# Patient Record
Sex: FEMALE | Race: WHITE | NOT HISPANIC OR LATINO | Employment: FULL TIME | URBAN - METROPOLITAN AREA
[De-identification: names, ages, dates, MRNs, and addresses within clinical notes are randomized per-mention and may not be internally consistent; named-entity substitution may affect disease eponyms.]

---

## 2017-01-14 ENCOUNTER — HOSPITAL ENCOUNTER (OUTPATIENT)
Facility: HOSPITAL | Age: 52
Discharge: HOME | End: 2017-01-16
Attending: EMERGENCY MEDICINE | Admitting: INTERNAL MEDICINE
Payer: COMMERCIAL

## 2017-01-14 DIAGNOSIS — F41.1 GAD (GENERALIZED ANXIETY DISORDER): ICD-10-CM

## 2017-01-14 DIAGNOSIS — R53.1 RIGHT SIDED WEAKNESS: ICD-10-CM

## 2017-01-14 DIAGNOSIS — R07.9 CHEST PAIN, UNSPECIFIED: Primary | ICD-10-CM

## 2017-01-14 DIAGNOSIS — I63.412 CEREBROVASCULAR ACCIDENT (CVA) DUE TO EMBOLISM OF LEFT MIDDLE CEREBRAL ARTERY: ICD-10-CM

## 2017-01-14 DIAGNOSIS — R94.31 ABNORMAL EKG: ICD-10-CM

## 2017-01-14 DIAGNOSIS — I63.9 CEREBRAL INFARCTION: ICD-10-CM

## 2017-01-14 DIAGNOSIS — G43.109 MIGRAINE WITH AURA AND WITHOUT STATUS MIGRAINOSUS, NOT INTRACTABLE: ICD-10-CM

## 2017-01-14 PROBLEM — I21.9 MI (MYOCARDIAL INFARCTION): Status: ACTIVE | Noted: 2017-01-14

## 2017-01-14 LAB
ALBUMIN SERPL BCP-MCNC: 4 G/DL
ALP SERPL-CCNC: 101 U/L
ALT SERPL W/O P-5'-P-CCNC: 19 U/L
ANION GAP SERPL CALC-SCNC: 11 MMOL/L
AST SERPL-CCNC: 25 U/L
BASOPHILS # BLD AUTO: 0.1 K/UL
BASOPHILS NFR BLD: 1 %
BILIRUB SERPL-MCNC: 0.3 MG/DL
BNP SERPL-MCNC: 49 PG/ML
BUN SERPL-MCNC: 8 MG/DL
CALCIUM SERPL-MCNC: 10.1 MG/DL
CHLORIDE SERPL-SCNC: 105 MMOL/L
CK MB SERPL-MCNC: 0.3 NG/ML
CK MB SERPL-RTO: 0.8 %
CK SERPL-CCNC: 37 U/L
CO2 SERPL-SCNC: 24 MMOL/L
CREAT SERPL-MCNC: 0.9 MG/DL
DIASTOLIC DYSFUNCTION: NO
DIFFERENTIAL METHOD: ABNORMAL
EOSINOPHIL # BLD AUTO: 0.6 K/UL
EOSINOPHIL NFR BLD: 8.9 %
ERYTHROCYTE [DISTWIDTH] IN BLOOD BY AUTOMATED COUNT: 13.5 %
EST. GFR  (AFRICAN AMERICAN): >60 ML/MIN/1.73 M^2
EST. GFR  (NON AFRICAN AMERICAN): >60 ML/MIN/1.73 M^2
ESTIMATED PA SYSTOLIC PRESSURE: 31.36
GLOBAL PERICARDIAL EFFUSION: NORMAL
GLUCOSE SERPL-MCNC: 92 MG/DL
HCT VFR BLD AUTO: 38.4 %
HGB BLD-MCNC: 13 G/DL
INR PPP: 1
LYMPHOCYTES # BLD AUTO: 3.5 K/UL
LYMPHOCYTES NFR BLD: 48.4 %
MCH RBC QN AUTO: 29.6 PG
MCHC RBC AUTO-ENTMCNC: 33.8 %
MCV RBC AUTO: 88 FL
MONOCYTES # BLD AUTO: 0.6 K/UL
MONOCYTES NFR BLD: 7.8 %
NEUTROPHILS # BLD AUTO: 2.4 K/UL
NEUTROPHILS NFR BLD: 33.9 %
PLATELET # BLD AUTO: 292 K/UL
PMV BLD AUTO: 7.5 FL
POTASSIUM SERPL-SCNC: 4.3 MMOL/L
PROT SERPL-MCNC: 7.6 G/DL
PROTHROMBIN TIME: 10.1 SEC
RBC # BLD AUTO: 4.38 M/UL
RETIRED EF AND QEF - SEE NOTES: 54 (ref 55–65)
SODIUM SERPL-SCNC: 140 MMOL/L
TROPONIN I SERPL DL<=0.01 NG/ML-MCNC: 0.01 NG/ML
TROPONIN I SERPL DL<=0.01 NG/ML-MCNC: 0.01 NG/ML
TROPONIN I SERPL DL<=0.01 NG/ML-MCNC: <0.006 NG/ML
TSH SERPL DL<=0.005 MIU/L-ACNC: 2.04 UIU/ML
WBC # BLD AUTO: 7.2 K/UL

## 2017-01-14 PROCEDURE — 80053 COMPREHEN METABOLIC PANEL: CPT

## 2017-01-14 PROCEDURE — 36415 COLL VENOUS BLD VENIPUNCTURE: CPT

## 2017-01-14 PROCEDURE — 93005 ELECTROCARDIOGRAM TRACING: CPT

## 2017-01-14 PROCEDURE — 92610 EVALUATE SWALLOWING FUNCTION: CPT

## 2017-01-14 PROCEDURE — G8998 SWALLOW D/C STATUS: HCPCS | Mod: CH

## 2017-01-14 PROCEDURE — 25000003 PHARM REV CODE 250: Performed by: EMERGENCY MEDICINE

## 2017-01-14 PROCEDURE — 63600175 PHARM REV CODE 636 W HCPCS: Performed by: EMERGENCY MEDICINE

## 2017-01-14 PROCEDURE — 63600175 PHARM REV CODE 636 W HCPCS: Performed by: INTERNAL MEDICINE

## 2017-01-14 PROCEDURE — C8929 TTE W OR WO FOL WCON,DOPPLER: HCPCS

## 2017-01-14 PROCEDURE — G8996 SWALLOW CURRENT STATUS: HCPCS | Mod: CH

## 2017-01-14 PROCEDURE — 82553 CREATINE MB FRACTION: CPT

## 2017-01-14 PROCEDURE — 85025 COMPLETE CBC W/AUTO DIFF WBC: CPT

## 2017-01-14 PROCEDURE — 84484 ASSAY OF TROPONIN QUANT: CPT | Mod: 91

## 2017-01-14 PROCEDURE — 96375 TX/PRO/DX INJ NEW DRUG ADDON: CPT

## 2017-01-14 PROCEDURE — 84484 ASSAY OF TROPONIN QUANT: CPT

## 2017-01-14 PROCEDURE — 99245 OFF/OP CONSLTJ NEW/EST HI 55: CPT | Mod: ,,, | Performed by: INTERNAL MEDICINE

## 2017-01-14 PROCEDURE — 63600175 PHARM REV CODE 636 W HCPCS: Performed by: NURSE PRACTITIONER

## 2017-01-14 PROCEDURE — 99204 OFFICE O/P NEW MOD 45 MIN: CPT | Mod: ,,, | Performed by: PSYCHIATRY & NEUROLOGY

## 2017-01-14 PROCEDURE — G8999 MOTOR SPEECH CURRENT STATUS: HCPCS | Mod: CI

## 2017-01-14 PROCEDURE — 99220 PR INITIAL OBSERVATION CARE,LEVL III: CPT | Mod: ,,, | Performed by: INTERNAL MEDICINE

## 2017-01-14 PROCEDURE — 96365 THER/PROPH/DIAG IV INF INIT: CPT

## 2017-01-14 PROCEDURE — 25000003 PHARM REV CODE 250: Performed by: INTERNAL MEDICINE

## 2017-01-14 PROCEDURE — 99285 EMERGENCY DEPT VISIT HI MDM: CPT | Mod: 25

## 2017-01-14 PROCEDURE — 84443 ASSAY THYROID STIM HORMONE: CPT

## 2017-01-14 PROCEDURE — 85610 PROTHROMBIN TIME: CPT

## 2017-01-14 PROCEDURE — 83880 ASSAY OF NATRIURETIC PEPTIDE: CPT

## 2017-01-14 PROCEDURE — G0378 HOSPITAL OBSERVATION PER HR: HCPCS

## 2017-01-14 PROCEDURE — 92523 SPEECH SOUND LANG COMPREHEN: CPT

## 2017-01-14 PROCEDURE — G9186 MOTOR SPEECH GOAL STATUS: HCPCS | Mod: CH

## 2017-01-14 PROCEDURE — G8997 SWALLOW GOAL STATUS: HCPCS | Mod: CH

## 2017-01-14 RX ORDER — PANTOPRAZOLE SODIUM 40 MG/1
40 TABLET, DELAYED RELEASE ORAL DAILY
Status: DISCONTINUED | OUTPATIENT
Start: 2017-01-14 | End: 2017-01-16 | Stop reason: HOSPADM

## 2017-01-14 RX ORDER — ONDANSETRON 2 MG/ML
4 INJECTION INTRAMUSCULAR; INTRAVENOUS EVERY 4 HOURS PRN
Status: DISCONTINUED | OUTPATIENT
Start: 2017-01-14 | End: 2017-01-16 | Stop reason: HOSPADM

## 2017-01-14 RX ORDER — AMOXICILLIN 250 MG
1 CAPSULE ORAL DAILY PRN
Status: DISCONTINUED | OUTPATIENT
Start: 2017-01-14 | End: 2017-01-16 | Stop reason: HOSPADM

## 2017-01-14 RX ORDER — NITROGLYCERIN 0.4 MG/1
0.4 TABLET SUBLINGUAL
Status: COMPLETED | OUTPATIENT
Start: 2017-01-14 | End: 2017-01-14

## 2017-01-14 RX ORDER — MORPHINE SULFATE 4 MG/ML
4 INJECTION, SOLUTION INTRAMUSCULAR; INTRAVENOUS EVERY 4 HOURS PRN
Status: DISCONTINUED | OUTPATIENT
Start: 2017-01-14 | End: 2017-01-16 | Stop reason: HOSPADM

## 2017-01-14 RX ORDER — ACETAMINOPHEN 325 MG/1
650 TABLET ORAL EVERY 6 HOURS PRN
Status: DISCONTINUED | OUTPATIENT
Start: 2017-01-14 | End: 2017-01-16 | Stop reason: HOSPADM

## 2017-01-14 RX ORDER — DIPHENHYDRAMINE HYDROCHLORIDE 50 MG/ML
12.5 INJECTION INTRAMUSCULAR; INTRAVENOUS
Status: COMPLETED | OUTPATIENT
Start: 2017-01-14 | End: 2017-01-14

## 2017-01-14 RX ORDER — ASPIRIN 325 MG
325 TABLET ORAL DAILY
Status: DISCONTINUED | OUTPATIENT
Start: 2017-01-15 | End: 2017-01-16 | Stop reason: HOSPADM

## 2017-01-14 RX ORDER — ASPIRIN 325 MG
325 TABLET ORAL
Status: COMPLETED | OUTPATIENT
Start: 2017-01-14 | End: 2017-01-14

## 2017-01-14 RX ORDER — ONDANSETRON 2 MG/ML
4 INJECTION INTRAMUSCULAR; INTRAVENOUS EVERY 8 HOURS PRN
Status: DISCONTINUED | OUTPATIENT
Start: 2017-01-14 | End: 2017-01-14

## 2017-01-14 RX ADMIN — ASPIRIN 325 MG ORAL TABLET 325 MG: 325 PILL ORAL at 10:01

## 2017-01-14 RX ADMIN — NITROGLYCERIN 0.4 MG: 0.4 TABLET SUBLINGUAL at 09:01

## 2017-01-14 RX ADMIN — HUMAN ALBUMIN MICROSPHERES AND PERFLUTREN 0.11 MG: 10; .22 INJECTION, SOLUTION INTRAVENOUS at 05:01

## 2017-01-14 RX ADMIN — MORPHINE SULFATE 4 MG: 4 INJECTION INTRAVENOUS at 07:01

## 2017-01-14 RX ADMIN — ACETAMINOPHEN 650 MG: 325 TABLET, FILM COATED ORAL at 12:01

## 2017-01-14 RX ADMIN — PANTOPRAZOLE SODIUM 40 MG: 40 TABLET, DELAYED RELEASE ORAL at 05:01

## 2017-01-14 RX ADMIN — NITROGLYCERIN 0.5 INCH: 20 OINTMENT TOPICAL at 10:01

## 2017-01-14 RX ADMIN — DIPHENHYDRAMINE HYDROCHLORIDE 12.5 MG: 50 INJECTION, SOLUTION INTRAMUSCULAR; INTRAVENOUS at 10:01

## 2017-01-14 RX ADMIN — ONDANSETRON 4 MG: 2 INJECTION INTRAMUSCULAR; INTRAVENOUS at 10:01

## 2017-01-14 RX ADMIN — ONDANSETRON 4 MG: 2 INJECTION INTRAMUSCULAR; INTRAVENOUS at 06:01

## 2017-01-14 RX ADMIN — MORPHINE SULFATE 4 MG: 4 INJECTION INTRAVENOUS at 02:01

## 2017-01-14 RX ADMIN — PROMETHAZINE HYDROCHLORIDE 12.5 MG: 25 INJECTION INTRAMUSCULAR; INTRAVENOUS at 10:01

## 2017-01-14 NOTE — CONSULTS
"Consult Note  Cardiology    Inpatient consult to Cardiology  Consult performed by: KERWIN LEIGH  Consult ordered by: HETAL HERRING        Reason for Consult: Chest Pain    SUBJECTIVE:     History of Present Illness:  Renée Goff is a 51 y.o. female with PMH of GHN, CVA x2, atrial septal defect s/p closure in 1996, migraines, who presents with chest pain.  Chest pain started last night while lying down.  Pain localized to mid chest, non-radiating with associated SOB.  Described as "pressure" in character, 8/10 in intensity.  Chest pain lasted approximately 10 hours, and improved to some degree with sublingual nitro.  She also reports a intermittent migraine headache for the past 48 hours.  Pt's  states she does not remember some of her grandchildren, or that her mother has passed away years ago.  Troponin negative  X 2.  EKG with sinus rhythm with PVC's.  Also with anterior T wave inversions which are chronic and unchanged compared to previous tracings.  Carotid ultrasound done today shows no significant carotid disease.   CT/MRI brain negative for significant abnormalities.  Pt appears nervous and very anxious with crying during exam.  Currently chest pain free and hemodynamically stable. Pt is followed by Dr. Chavira in clinic. Of note, pt does not have history of CAD,  and has never had an MI.  Previously presented with chest pain in 2012, and underwent cardiac workup which included a normal nuclear stress test.  Echo at that time with EF 51% and normal diastolic function.      Review of patient's allergies indicates:  No Known Allergies    Current Discharge Medication List      CONTINUE these medications which have NOT CHANGED    Details   alprazolam (XANAX) 0.5 MG tablet Take 0.5 mg by mouth nightly as needed.        amitriptyline (ELAVIL) 10 MG tablet Take 40 mg by mouth every evening.       aspirin (ECOTRIN) 81 MG EC tablet Take 81 mg by mouth once daily.        multivitamin (MULTIVITAMIN) per " tablet Take 1 tablet by mouth once daily.        propranolol (INDERAL) 40 MG tablet Take 1 tablet (40 mg total) by mouth 2 (two) times daily.  Qty: 180 tablet, Refills: 3    Associated Diagnoses: Migraine with aura and without status migrainosus, not intractable; Heart palpitations      meloxicam (MOBIC) 7.5 MG tablet Take 7.5 mg by mouth 2 (two) times daily.      promethazine-dextromethorphan (PROMETHAZINE-DM) 6.25-15 mg/5 mL Syrp Take 5 mLs by mouth every 4 (four) hours as needed.  Qty: 180 mL, Refills: 0    Associated Diagnoses: Acute sinusitis, recurrence not specified, unspecified location                 Past Medical History   Diagnosis Date    MI (myocardial infarction)     Stroke      Past Surgical History   Procedure Laterality Date    Coronary artery bypass graft       Family History   Problem Relation Age of Onset    Heart attack Maternal Grandfather      Social History   Substance Use Topics    Smoking status: Never Smoker    Smokeless tobacco: Never Used    Alcohol use No        Review of Systems:  Constitution: Denies chills, fever, and sweats.  HENT: Denies headaches or blurry vision.  Cardiovascular: Denies chest pain or irregular heart beat.  Respiratory: Denies cough or shortness of breath.  Gastrointestinal: Denies abdominal pain, nausea, or vomiting.  Musculoskeletal: Denies muscle cramps.  Neurological: Denies dizziness or focal weakness.  Psychiatric/Behavioral: Normal mental status.  Hematologic/Lymphatic: Denies bleeding problem or easy bruising/bleeding.  Skin: Denies rash or suspicious lesions      OBJECTIVE:     Vital Signs (Most Recent)  Temp: 99.2 °F (37.3 °C) (01/14/17 1133)  Pulse: 73 (01/14/17 1133)  Resp: 18 (01/14/17 1133)  BP: 126/85 (01/14/17 1133)  SpO2: 100 % (01/14/17 1133)    Vital Signs Range (Last 24H):  Temp:  [96.8 °F (36 °C)-99.2 °F (37.3 °C)]   Pulse:  []   Resp:  [18-20]   BP: (117-159)/(63-98)   SpO2:  [87 %-100 %]     Physical Exam:  Constitutional: No  acute distress, conversant  HEENT: Sclera anicteric, Pupils equal, round and reactive to light, extraocular motions intact, Oropharynx clear  Neck: No JVD, no carotid bruits  Cardiovascular: regular rate and rhythm, no murmur, rubs or gallops, normal S1/S2  Pulmonary: Clear to auscultation bilaterally  Abdominal: Abdomen soft, nontender, nondistended, positive bowel sounds  Extremities: No lower extremity edema,   Pulses:  Carotid pulses are 2+ on the right side, and 2+ on the left side.  Radial pulses are 2+ on the right side, and 2+ on the left side.   Femoral pulses are 2+ on the right side, and 2+ on the left side.  Popliteal pulses are 2+ on the right side, and 2+ on the left side.   Dorsalis pedis pulses are 2+ on the right side, and 2+ on the left side.   Posterior tibial pulses are 2+ on the right side, and 2+ on the left side.     Skin: No ecchymosis, erythema, or ulcers  Psych: Alert and oriented x 3, appropriate affect  Neuro: CNII-XII intact, no focal deficits    Laboratory:  Chemistry:  Lab Results   Component Value Date     01/14/2017    K 4.3 01/14/2017     01/14/2017    CO2 24 01/14/2017    BUN 8 01/14/2017    CREATININE 0.9 01/14/2017    CREATININE 0.8 11/14/2012    GLUCOSE 74 11/14/2012    CALCIUM 10.1 01/14/2017    CALCIUM 9.6 11/14/2012    BNP 49 01/14/2017     Cardiac Markers:  Lab Results   Component Value Date    TROPONINI 0.015 01/14/2017    TROPONINI 0.013 11/14/2012     Cardiac Markers (Last 3):  Lab Results   Component Value Date    TROPONINI 0.015 01/14/2017    TROPONINI 0.013 11/14/2012    TROPONINI <0.006 11/14/2012    TROPONINI <0.006 11/13/2012     CBC:   Lab Results   Component Value Date    WBC 7.20 01/14/2017    HGB 13.0 01/14/2017    HGB 12.3 11/14/2012    HCT 38.4 01/14/2017    MCV 88 01/14/2017     01/14/2017     Lipids:  Lab Results   Component Value Date    CHOL 218 (H) 04/27/2012    TRIG 128 04/27/2012    HDL 63 11/14/2012     Coagulation:   Lab Results    Component Value Date    INR 1.0 01/14/2017    INR 1.01 11/12/2012    APTT 24.7 11/12/2012       Diagnostic Results:  Labs: Reviewed  ECG: Reviewed  MRI: Reviewed    Carotid Ultrasound 1/14/2016:  FINDINGS:     Right common carotid:   Peak systolic velocity 89 cm/sec.    Right internal carotid artery:   Peak systolic velocity: 82 cm/sec.   IC/CC ratio:  0.9.    Left common carotid:   Peak systolic velocity 105 cm/sec.    Left internal carotid artery:     Peak systolic velocity 76 cm/sec.    IC/CC ratio 0.7.    Significant atherosclerotic plaque or calcification is not identified by real-time ultrasound.Both vertebral arteries demonstrate antegrade flow.   Impression       Significant atherosclerotic plaque or calcification is not identified in the carotid arteries by real-time ultrasound or NASCET criteria          MRI Brain 1/14/2017:  Negative MRI of the brain.  Chronic right maxillary and ethmoid sinusitis    CT head 1/14/2017:  Negative Head CT.     Echo 11/13/2012:  CONCLUSIONS     1 - Mild left ventricular enlargement.     2 - Low normal left ventricular function (EF 51%).     3 - Normal diastolic function.     4 - Right ventricle is upper limit of normal in size with normal systolic function.     Nuclear stress test 11/13/2012:  Nuclear Quantitative Functional Analysis:   LVEF: 53 % (normal is 55 - 69)  LVED Volume: 105 ml (normal is 60 - 98)  LVES Volume: 49 ml (normal is 20 - 42)    Impression: NORMAL MYOCARDIAL PERFUSION  1. The perfusion scan is free of evidence for myocardial ischemia or injury.   2. There is a moderate intensity fixed defect in the anteroapical wall of the left ventricle, secondary to breast attenuation.   3. Resting wall motion is physiologic.   4. There is resting LV dysfunction with a reduced ejection fraction of 53 %.  (normal is 55 - 69)  5. The ventricular volumes are normal at rest and stress.   6. The extracardiac distribution of radioactivity is normal.     ASSESSMENT/PLAN:    Renée Goff is a 51 y.o. female with PMH of GHN, CVA x2, atrial septal defect s/p closure in 1996, migraines, who presents with chest pain.      -Continue full dose ASA daily and trend cardiac enzymes every 6 hours with EKG  -Echo pending  -Continue to monitor on telemetry

## 2017-01-14 NOTE — ED PROVIDER NOTES
Encounter Date: 1/14/2017       History   No chief complaint on file.    Review of patient's allergies indicates:  No Known Allergies  HPI Comments: 51-year-old female with a history of a stroke and a myocardial infarction presents to the ER complaining of chest pain which began last night.  Also complaining of arm pain and back pain and headache.  She states the headache began last night.  No further history is obtainable from the patient.  She cannot state any aggravating or alleviating factors.    The history is provided by the patient. History limited by: Very poor historian.     Past Medical History   Diagnosis Date    MI (myocardial infarction)     Stroke      No past medical history pertinent negatives.  Past Surgical History   Procedure Laterality Date    Coronary artery bypass graft       Family History   Problem Relation Age of Onset    Heart attack Maternal Grandfather      Social History   Substance Use Topics    Smoking status: Never Smoker    Smokeless tobacco: Never Used    Alcohol use No     Review of Systems   Unable to perform ROS: Other (Unable to evaluate secondary to mental status)       Physical Exam   Initial Vitals   BP Pulse Resp Temp SpO2   -- -- -- -- --            Physical Exam    Nursing note and vitals reviewed.  Constitutional: She appears well-developed and well-nourished. She is not diaphoretic. She is uncooperative. She appears ill. No distress.   Chronically ill-appearing   HENT:   Head: Normocephalic and atraumatic.   Eyes: EOM are normal.   Refuses to open her eyes for exam   Neck: Normal range of motion. Neck supple.   Cardiovascular: Normal rate, regular rhythm, normal heart sounds and intact distal pulses.   Intact distal radius pulses   Pulmonary/Chest: Breath sounds normal. No respiratory distress. She has no wheezes.   Abdominal: Soft. She exhibits no distension. There is no tenderness. There is no rebound.   Musculoskeletal: Normal range of motion.   Neurological:  "She is alert and oriented to person, place, and time.   Oriented to "2017" and "hospital"    Slurring her words slightly.    Able to briefly raise her right arm and right leg off of the gurney with a rapidly fall back down.  Subjective decreased sensation to light touch in the right arm and the right leg.   Skin: Skin is warm and dry.   Psychiatric: She has a normal mood and affect. Her behavior is normal. Judgment and thought content normal.    anxious         ED Course   Procedures  Labs Reviewed - No data to display  EKG Readings: (Independently Interpreted)   Initial Reading: No STEMI. Previous EKG: Compared with most recent EKG Previous EKG Date: No change from November 3, 2016. Rhythm: Normal Sinus Rhythm. Ectopy: No Ectopy PVCs. Conduction: Normal. ST Segments: Normal ST Segments. T Waves: Normal. T Waves Flipped: V2, V3, V4, V6 and V5. Clinical Impression: Normal Sinus Rhythm          Medical Decision Making:   Clinical Tests:   Lab Tests: Ordered and Reviewed  Radiological Study: Ordered and Reviewed  Medical Tests: Ordered and Reviewed                   ED Course   Comment By Time   Chest pain has resolved at this time.  Complaining of a headache. Noah Coffey MD 01/14 6630   Case discussed with Dr. Zamora of neurology who recommends admission to the hospital full dose aspirin and an MRI.  Case also discussed with Dr. Bynum of hospital medicine who will admit the patient.  Patient will be admitted on telemetry for serial troponins. Noah Coffey MD 01/14 6596     Clinical Impression:   The primary encounter diagnosis was Chest pain, unspecified. A diagnosis of Right sided weakness was also pertinent to this visit.      51-year-old female with a history of a stroke and myocardial infarctions presents for chest pain and arm pain and back pain.  Unable to obtain review of systems.  Patient unable to answer basic questions regarding history of present illness, review of systems were her basic medical " history.  She is oriented to location and year.  She cannot remember her primary care provider or her past medical history.  She cannot state any aggravating or alleviating factors.  She refuses to open her eyes during the exam.  She appears anxious.  Very wide differential, CT of the head labs x-ray EKG will be obtained.    9:06 AM   now here and can help provide more history.  He states the patient awoke this morning with chest pain and arm pain and back pain.  He states this happens to her about once a year.  Also complaining of a frontal headache which is typical for her.  Also complaining of some shortness of breath.  Patient is also complaining of numbness and weakness to the right arm and right leg which is worse than her baseline.  Her  has helped to calm her down and she is now able to participate in a physical exam.  She is hardly able to get her right leg off of the bed at all and can only briefly raise her right arm before it falls back down to the bed.  Some subjective decreased sensation to light touch to the right lower extremity and the right arm.  Normal pulses throughout I doubt dissection.  She is able to raise her left arm normally.  She can raise her left leg off of the bed and hold it there for several seconds before he falls back down.  No appreciable facial droop.  Patient will be evaluated for both a stroke and an MI.  I doubt pulmonary embolus or dissection.  Patient will be admitted to hospital medicine for further care.     Noah Coffey MD  01/14/17 4983

## 2017-01-14 NOTE — IP AVS SNAPSHOT
[address] [place] [address]  Phone: [phone]           I have received a copy of my After Visit Summary and discharge instructions from [facility].    INSTRUCTIONS RECEIVED AND UNDERSTOOD BY:                     Patient/Patient Representative: ________________________________________________________________     Date/Time: ________________________________________________________________                     Instructions Given By: ________________________________________________________________     Date/Time: ________________________________________________________________

## 2017-01-14 NOTE — IP AVS SNAPSHOT
[address] [place] [address]  Phone: [phone]           Patient Discharge Instructions     Our goal is to set you up for success. This packet includes information on your condition, medications, and your home care. It will help you to care for yourself so you don't get sicker and need to go back to the hospital.     Please ask your nurse if you have any questions.        There are many details to remember when preparing to leave the hospital. Here is what you will need to do:    1. Take your medicine. If you are prescribed medications, review your Medication List in the following pages. You may have new medications to  at the pharmacy and others that you'll need to stop taking. Review the instructions for how and when to take your medications. Talk with your doctor or nurses if you are unsure of what to do.     2. Go to your follow-up appointments. Specific follow-up information is listed in the following pages. Your may be contacted by a transition nurse or clinical provider about future appointments. Be sure we have all of the phone numbers to reach you, if needed. Please contact your provider's office if you are unable to make an appointment.     3. Watch for warning signs. Your doctor or nurse will give you detailed warning signs to watch for and when to call for assistance. These instructions may also include educational information about your condition. If you experience any of warning signs to your health, call your doctor.               [facility]  Unless otherwise directed by your provider, please contact [facility], our nurse care line that is available for 24/7 assistance.     [phone] (toll-free)    Registered nurses in the [facility] provide clinical advisement, health education, appointment booking, and other advisory services.                    ** Verify the list of medication(s) below is accurate and up to date.  Carry this with you in case of emergency. If your medications have changed, please notify your healthcare provider.             Medication List      START taking these medications        Additional Info                      aspirin 325 MG tablet   Refills:  0   Dose:  325 mg   Replaces:  aspirin 81 MG EC tablet    Last time this was given:  325 mg on 1/16/2017  8:34 AM   Instructions:  Take 1 tablet (325 mg total) by mouth once daily.     Begin Date    AM    Noon    PM    Bedtime       atorvastatin 20 MG tablet   Commonly known as:  LIPITOR   Quantity:  30 tablet   Refills:  0   Dose:  20 mg    Last time this was given:  20 mg on 1/16/2017  8:34 AM   Instructions:  Take 1 tablet (20 mg total) by mouth once daily.     Begin Date    AM    Noon    PM    Bedtime       butalbital-acetaminophen-caffeine -40 mg -40 mg per tablet   Commonly known as:  FIORICET, ESGIC   Quantity:  15 tablet   Refills:  0   Dose:  1 tablet    Last time this was given:  1 tablet on 1/16/2017  8:37 AM   Instructions:  Take 1 tablet by mouth every 4 (four) hours as needed for Headaches.     Begin Date    AM    Noon    PM    Bedtime         CONTINUE taking these medications        Additional Info                      alprazolam 0.5 MG tablet   Commonly known as:  XANAX   Refills:  0   Dose:  0.5 mg    Instructions:  Take 0.5 mg by mouth nightly as needed.     Begin Date    AM    Noon    PM    Bedtime       amitriptyline 10 MG tablet   Commonly known as:  ELAVIL   Refills:  0   Dose:  40 mg    Instructions:  Take 40 mg by mouth every evening.     Begin Date    AM    Noon    PM    Bedtime       meloxicam 7.5 MG tablet   Commonly known as:  MOBIC   Refills:  0   Dose:  7.5 mg    Instructions:  Take 7.5 mg by mouth 2 (two) times daily.     Begin Date    AM    Noon    PM    Bedtime       ONE DAILY MULTIVITAMIN per tablet   Refills:  0   Dose:  1 tablet   Generic drug:  multivitamin    Instructions:  Take 1 tablet by mouth once daily.      Begin Date    AM    Noon    PM    Bedtime       propranolol 40 MG tablet   Commonly known as:  INDERAL   Quantity:  180 tablet   Refills:  3   Dose:  40 mg   Indications:  Migraine Prevention, palpitations    Instructions:  Take 1 tablet (40 mg total) by mouth 2 (two) times daily.     Begin Date    AM    Noon    PM    Bedtime         STOP taking these medications     aspirin 81 MG EC tablet   Commonly known as:  ECOTRIN   Replaced by:  aspirin 325 MG tablet       promethazine-dextromethorphan 6.25-15 mg/5 mL Syrp   Commonly known as:  PROMETHAZINE-DM            Where to Get Your Medications      You can get these medications from any pharmacy     Bring a paper prescription for each of these medications     atorvastatin 20 MG tablet    butalbital-acetaminophen-caffeine -40 mg -40 mg per tablet       You don't need a prescription for these medications     aspirin 325 MG tablet                  Please bring to all follow up appointments:    1. A copy of your discharge instructions.  2. All medicines you are currently taking in their original bottles.  3. Identification and insurance card.    Please arrive 15 minutes ahead of scheduled appointment time.    Please call 24 hours in advance if you must reschedule your appointment and/or time.        Your Scheduled Appointments     Jan 25, 2017 10:00 AM CST   Established Patient Visit with Thomas Khan MD   [facility]    [address] [facility] [id number] [phone]            Nov 09, 2017  1:30 PM CST   Established Patient Visit with MD [facility] ([facility])    [address]  [facility] [address] [phone]              Follow-up Information     Follow up with Thomas Khan MD On 1/25/2017.    Specialty:  Family Medicine    Why:  @10am     Contact information:    [address] [facility] [address]  [facility] [address]  [phone]          Follow up with  Giacomo Chavira MD.    Specialty:  Cardiology    Contact information:    [address]  [facility] [address]  [phone]          Follow up with Dougie Zamora Jr, MD.    Specialty:  Neurology    Contact information:    [address]  [facility] [address]  [phone]        Referrals     Future Orders    Ambulatory Referral to Physical/Occupational Therapy     Questions:    Post Surgical?:  No    Eval and Treat:  Yes    Duration:      Frequency (times per week):      Precautions:      Location:      OT Location:      Restore Functional ADL Training?:      Gait Training:      Therapeutic Exercises:      Wound Care:      Traction:      Electric Stimulation:      IONTO.:      U/S:      Developmental Stimulation?:      Specialty Programs:      Ambulatory Referral to Speech Therapy     Questions:    Speech Therapy Eval and Treat:      Speech Language Eval and Treat:      Bedside Swallow Study:      Modified Barium Swallow Study:      Referral to OutPatient Speech Therapy     Questions:    Speech Therapy Eval and Treat:  Yes    Speech Language Eval and Treat:  Yes    Bedside Swallow Study:      Modified Barium Swallow Study:          Discharge Instructions     Future Orders    Call MD for:     Comments:    For worsening symptoms, chest pain, shortness of breath, increased abdominal pain, high grade fever, stroke or stroke like symptoms, immediately go to the nearest Emergency Room or call 911 as soon as possible.    Diet general     Comments:    Cardiac/ 2 gram sodium low cholesterol diet    Questions:    Total calories:      Fat restriction, if any:      Protein restriction, if any:      Na restriction, if any:      Fluid restriction:      Additional restrictions:      Other restrictions (specify):     Comments:    Fall precautions        Primary Diagnosis     Your primary diagnosis was:  Chest Pain      Admission Information     Date & Time Provider Department CSN    1/14/2017  8:27 AM Aqib Sultan, MD [facility]  "[facility] [id number]      Care Providers     Provider Role Specialty Primary office phone    Sharon Bynum MD Attending Provider Internal Medicine [phone]    Giacomo Chavira MD Consulting Physician  Cardiology [phone]    Dougie Zamora Jr., MD Consulting Physician  Neurology [phone]    Shirley Jennings NP Consulting Provider  Cardiology  [phone]      Your Vitals Were     BP Pulse Temp Resp Height Weight    103/65 (BP Location: Right arm, Patient Position: Lying, BP Method: Automatic) 81 98.8 °F (37.1 °C) (Oral) 18 5' 4" (1.626 m) 76.7 kg (169 lb)    Last Period SpO2 BMI          10/12/2012 98% 29.01 kg/m2        Recent Lab Values        4/27/2012                           3:46 PM           A1C 5.5                       Allergies as of 1/16/2017     No Known Allergies      Advance Directives     An advance directive is a document which, in the event you are no longer able to make decisions for yourself, tells your healthcare team what kind of treatment you do or do not want to receive, or who you would like to make those decisions for you.  If you do not currently have an advance directive, [facility] encourages you to create one.  For more information call:  [phone]), [phone] ([phone]),  or log on to [email or web address].        Language Assistance Services     ATTENTION: Language assistance services are available, free of charge. Please call [phone].      ATENCIÓN: Si habla español, tiene a gutierrez disposición servicios gratuitos de asistencia lingüística. Llame al [phone].     CHÚ Ý: N?u b?n nói Ti?ng Vi?t, có các d?ch v? h? tr? ngôn ng? mi?n phí dành cho b?n. G?i s? [phone].        Stroke Education               [facility] complies with applicable Federal civil rights laws and does not discriminate on the basis of race, color, national origin, age, disability, or sex.        "

## 2017-01-14 NOTE — ED NOTES
Multiple family @ bedside. Appears more calm - denies CP @ present. IV meds infusing via pump @ this time

## 2017-01-14 NOTE — H&P
PCP: Thomas Khan MD    History & Physical    Chief Complaint: Chest pain since last night                                 Headache for 2 days.    History of Present Illness:  Patient is a 51 y.o. female admitted to Hospitalist Service from [facility] with complaint of chest pain for 1 day. Patient reportedly has past medical history significant for hypertension, CAD, history of AMI and CVA. Patient reported chest pain started last night which is radiating to arms and upper back. Patient has also been experiencing headache. No head injury, LOC or seizure activity reported. Patient denied any focal deficits. No associated nausea or diaphorsis reported. No leg swelling of calf tenderness. Patient denied shortness of breath, abdominal pain, nausea, vomiting, headache, vision changes, focal neuro-deficits, cough or fever.    Past Medical History   Diagnosis Date    MI (myocardial infarction)     Stroke      Past Surgical History   Procedure Laterality Date    Coronary artery bypass graft       Family History   Problem Relation Age of Onset    Heart attack Maternal Grandfather      Social History   Substance Use Topics    Smoking status: Never Smoker    Smokeless tobacco: Never Used    Alcohol use No      Review of patient's allergies indicates:  No Known Allergies    (Not in a hospital admission)  Review of Systems:  Unable to obtain due to altered mental status.     OBJECTIVE:     Vital Signs (Most Recent)  Temp: 96.8 °F (36 °C) (01/14/17 0828)  Pulse: 69 (01/14/17 1012)  Resp: 20 (01/14/17 0828)  BP: 130/63 (01/14/17 1012)  SpO2: 99 % (01/14/17 1012)    Physical Exam:  General appearance: well developed, appears stated age, chronically ill-appearing  Head: normocephalic, atraumatic  Eyes:  conjunctivae/corneas clear. PERRL.  Nose: Nares normal. Septum midline.  Throat: lips, mucosa, and tongue normal; teeth and gums normal, no throat erythema.  Neck: supple, symmetrical,  trachea midline, no JVD and thyroid not enlarged, symmetric, no tenderness/mass/nodules  Lungs:  clear to auscultation bilaterally and normal respiratory effort  Chest wall: no tenderness  Heart: regular rate and rhythm, S1, S2 normal, no murmur, click, rub or gallop  Abdomen: soft, non-tender non-distented; bowel sounds normal; no masses,  no organomegaly  Extremities: no cyanosis, clubbing or edema.   Pulses: 2+ and symmetric  Skin: Skin color, texture, turgor normal. No rashes or lesions.  Lymph nodes: Cervical, supraclavicular, and axillary nodes normal.  Neurologic: Right hemifacial weakness, not able to open right eye completely, right hemiparesis 3-4/5 (worse than before per patient).    Laboratory:   CBC:   Recent Labs  Lab 01/14/17  0845   WBC 7.20   RBC 4.38   HGB 13.0   HCT 38.4      MCV 88   MCH 29.6   MCHC 33.8     CMP:   Recent Labs  Lab 01/14/17  0845   GLU 92   CALCIUM 10.1   ALBUMIN 4.0   PROT 7.6      K 4.3   CO2 24      BUN 8   CREATININE 0.9   ALKPHOS 101   ALT 19   AST 25   BILITOT 0.3     Coagulation:   Recent Labs  Lab 01/14/17  0845   INR 1.0     Cardiac markers:   Recent Labs  Lab 01/14/17  0845   TROPONINI 0.015     Hemoglobin A1C   Date Value Ref Range Status   04/27/2012 5.5 4.0 - 6.2 % Final     Microbiology Results (last 7 days)     ** No results found for the last 168 hours. **        Diagnostic Results:  Chest X-Ray: Prior sternotomy otherwise negative portable chest    CT Head Negative Head CT.    Assessment/Plan:     Active Hospital Problems    Diagnosis  POA    *Chest pain, unspecified [R07.9]  Yes    MI (myocardial infarction) [I21.3]  Yes    Right sided weakness (rule out new CVA) [M62.81]  Yes    Migraine [G43.909]  Yes    CVA (cerebral vascular accident), x2, 1996 [I63.9]  Yes    Abnormal EKG [R94.31]  Yes    KIRA (generalized anxiety disorder) [F41.1]  Yes      Resolved Hospital Problems    Diagnosis Date Resolved POA   No resolved problems to display.      Supplemental O2 via nasal canula; titrate O2 saturation to >92%.  Serial Cardiac enzymes × 3.  Tele-monitoring.   Cardiology Consultation.  Check fasting lipid panel and EKG in AM.  Use Nitroglycerine PRN Chest pain.  Neurochecks q 4 hrs x 24 hrs.  Fall and seizure precautions.  NPO until evaluated by speech therapist for swallowing assessment.  Start Aspirin 325 mg po q day. No t-PA given.  Neurology consult.  MRI Brain.  2 D ECHO for evaluation of intra-cardiac thrombus or valvular heart disease.  Check Lipid Profile.  Consult Physical Therapy and Occupational therapy for evaluation and treatment.  Gastric ulcer prophylaxis with gastric acid suppressant.   Deep venous thrombosis prophylaxis. Discussed with patient's  and children. See Orders.    VTE Risk Mitigation  SCDs    None        Sahron Bynum MD  Department of Hospital Medicine   [facility]

## 2017-01-14 NOTE — PT/OT/SLP EVAL
"Speech Language Pathology Evaluation    Renée Goff   MRN: [id number]   Admitting Diagnosis: Chest pain, unspecified    Diet recommendations: Solid Diet Level: Regular  Liquid Diet Level: Thin Feed only when awake/alert    SLP Treatment Date: 01/14/17  Speech Start Time: 1501     Speech Stop Time: 1528     Speech Total (min): 27 min       TREATMENT BILLABLE MINUTES:  Eval 19 , Eval Swallow and Oral Function 8 and Total Time 27    Diagnosis: Chest pain, unspecified      Past Medical History   Diagnosis Date    MI (myocardial infarction)     Stroke      Past Surgical History   Procedure Laterality Date    Coronary artery bypass graft         Has the patient been evaluated by SLP for swallowing? : Yes  Keep patient NPO?: No   General Precautions: Standard,            Social Hx: Patient lives with spouse    Prior diet: regular textures & liquids.    Subjective:  "not too high" (re raising head of bed)  Patient goals: not stated.    Objective:    Pt given morphine for headache ~15 minutes ago, kept eyes closed unless cued.  R eye closed more readily & stayed 1/2 open when cued.  Spouse reported hx CVA ~20 years ago with R sanjuana;stated she has these headaches/incidents about every 1 1/2 year but never this intense & R weakness & pain are new.  Denied pulmonary or GI problems, no neck/throat surgery; no hx swallowing problems.    CXR - negative  CT Head - negative  MRI head - negative    Oral Musculature Evaluation  Oral Musculature: facial asymmetry present (L naso-labial fold less than R; )  Dentition: present and adequate  Mucosal Quality: good  Mandibular Strength and Mobility: WFL  Oral Labial Strength and Mobility: impaired retraction, impaired pursing, impaired coordination (held lips pulled to R)  Lingual Strength and Mobility: functional anterior elevation, functional lateral movement, functional protrusion (held tongue bunched to R in mandible)  Velar Elevation: WFL  Voice Prior to PO Intake: clear, low " volume   DDK/AMRs - irregular, rushed    Cognitive Status:  Behavioral Observations: cooperative and w/ initiation problems-  Memory and Orientation: not tested today  Attention: intact for short periods.  Problem Solving: not tested  Pragmatics: not assessed today due to morphine on board, & severe headache  Executive Function: not tested    Language:   Auditory Comprehension: followed one step commands, ID'd pix, answered personal ??s  Verbal Expression: intact naming, picture description in complete sentences    Motor Speech: increased rate, irregular rhythm, initial sound repetitions; all speech sounds were clear & precise    Voice: quality appropriate for age & gender, low volume, adequate breath support    Reading: read phrase & demo'd comprehension, read numbers    Writing: not tested    Visual-Spatial: no apparent field cut or neglect     Bedside Swallow Eval:  Consistencies Assessed: Thin liquids sips & 3 oz water challenge via straw, Soft solids peaches in thin juice and Solids 1/2 cookie  Oral Phase: WFL  Pharyngeal Phase:  cough on initial straw sip of liquid, not replicated;  no overt clinical  signs/symptoms of aspiration and no overt clinical signs/symptoms of pharyngeal dysphagia on peaches, cookie, other straw sips or 3 oz water challenge    Additional Treatment:  Education to pt & family re eval findings, plan for further testing after headache resolves    Assessment:  Renée Goff is a 51 y.o. female with a SLP diagnosis of Dysarthria. She presented with no s/s oropharyngeal dysphagia; tmvyjc-[id number] eval was limitedd ue to pt's current condition, but she was noted to have motor speech disorders (dysfluencies, rapid rate, irregular rhythm).  Additionally, facial symmetry was not at baseline as per family.  Recommend regular textures & liquids.  Will follow-up to complete evaluation on Monday.       Goals:   SLP Goals     Not on file           Plan:   Patient to be seen     Plan of  Care expires:    Plan of Care reviewed with: patient, spouse  SLP Follow-up?: Yes  SLP - Next Visit Date: 01/16/17           Stefanie Crespo CCC-SLP/A  01/14/2017

## 2017-01-14 NOTE — ED NOTES
"Pt retrieved from vehicle for c/o of "chest pain" throughout the night--further inquiry also reveals H/A x 2 days & additional back pain. Noted lag time in thought & speech (Hx of previous CVA). Anxious on arrival  "

## 2017-01-15 LAB
ANION GAP SERPL CALC-SCNC: 9 MMOL/L
BASOPHILS # BLD AUTO: 0 K/UL
BASOPHILS NFR BLD: 0.4 %
BUN SERPL-MCNC: 11 MG/DL
CALCIUM SERPL-MCNC: 10.2 MG/DL
CHLORIDE SERPL-SCNC: 104 MMOL/L
CHOLEST/HDLC SERPL: 3.8 {RATIO}
CHOLEST/HDLC SERPL: 3.8 {RATIO}
CK MB SERPL-MCNC: 0.3 NG/ML
CK MB SERPL-RTO: 1 %
CK SERPL-CCNC: 30 U/L
CO2 SERPL-SCNC: 28 MMOL/L
CREAT SERPL-MCNC: 0.8 MG/DL
DIFFERENTIAL METHOD: ABNORMAL
EOSINOPHIL # BLD AUTO: 0.2 K/UL
EOSINOPHIL NFR BLD: 2.2 %
ERYTHROCYTE [DISTWIDTH] IN BLOOD BY AUTOMATED COUNT: 13.3 %
EST. GFR  (AFRICAN AMERICAN): >60 ML/MIN/1.73 M^2
EST. GFR  (NON AFRICAN AMERICAN): >60 ML/MIN/1.73 M^2
GLUCOSE SERPL-MCNC: 117 MG/DL
HCT VFR BLD AUTO: 38.2 %
HDL/CHOLESTEROL RATIO: 26 %
HDL/CHOLESTEROL RATIO: 26 %
HDLC SERPL-MCNC: 227 MG/DL
HDLC SERPL-MCNC: 227 MG/DL
HDLC SERPL-MCNC: 59 MG/DL
HDLC SERPL-MCNC: 59 MG/DL
HGB BLD-MCNC: 12.7 G/DL
LDLC SERPL CALC-MCNC: 151.4 MG/DL
LDLC SERPL CALC-MCNC: 151.4 MG/DL
LYMPHOCYTES # BLD AUTO: 1.9 K/UL
LYMPHOCYTES NFR BLD: 22.5 %
MAGNESIUM SERPL-MCNC: 2.4 MG/DL
MCH RBC QN AUTO: 29.6 PG
MCHC RBC AUTO-ENTMCNC: 33.3 %
MCV RBC AUTO: 89 FL
MONOCYTES # BLD AUTO: 0.3 K/UL
MONOCYTES NFR BLD: 3.5 %
NEUTROPHILS # BLD AUTO: 6 K/UL
NEUTROPHILS NFR BLD: 71.4 %
NONHDLC SERPL-MCNC: 168 MG/DL
NONHDLC SERPL-MCNC: 168 MG/DL
PLATELET # BLD AUTO: 250 K/UL
PMV BLD AUTO: 7.8 FL
POTASSIUM SERPL-SCNC: 5 MMOL/L
RBC # BLD AUTO: 4.29 M/UL
SODIUM SERPL-SCNC: 141 MMOL/L
TRIGL SERPL-MCNC: 83 MG/DL
TRIGL SERPL-MCNC: 83 MG/DL
TROPONIN I SERPL DL<=0.01 NG/ML-MCNC: 0.02 NG/ML
WBC # BLD AUTO: 8.4 K/UL

## 2017-01-15 PROCEDURE — 85025 COMPLETE CBC W/AUTO DIFF WBC: CPT

## 2017-01-15 PROCEDURE — 80061 LIPID PANEL: CPT

## 2017-01-15 PROCEDURE — 63600175 PHARM REV CODE 636 W HCPCS: Performed by: NURSE PRACTITIONER

## 2017-01-15 PROCEDURE — 25000003 PHARM REV CODE 250: Performed by: NURSE PRACTITIONER

## 2017-01-15 PROCEDURE — 82553 CREATINE MB FRACTION: CPT

## 2017-01-15 PROCEDURE — 63600175 PHARM REV CODE 636 W HCPCS: Performed by: INTERNAL MEDICINE

## 2017-01-15 PROCEDURE — 93005 ELECTROCARDIOGRAM TRACING: CPT

## 2017-01-15 PROCEDURE — 63600175 PHARM REV CODE 636 W HCPCS: Performed by: EMERGENCY MEDICINE

## 2017-01-15 PROCEDURE — 83735 ASSAY OF MAGNESIUM: CPT

## 2017-01-15 PROCEDURE — 25000003 PHARM REV CODE 250: Performed by: EMERGENCY MEDICINE

## 2017-01-15 PROCEDURE — G0378 HOSPITAL OBSERVATION PER HR: HCPCS

## 2017-01-15 PROCEDURE — 80048 BASIC METABOLIC PNL TOTAL CA: CPT

## 2017-01-15 PROCEDURE — 99225 PR SUBSEQUENT OBSERVATION CARE,LEVEL II: CPT | Mod: ,,, | Performed by: INTERNAL MEDICINE

## 2017-01-15 PROCEDURE — 84484 ASSAY OF TROPONIN QUANT: CPT

## 2017-01-15 RX ORDER — BUTALBITAL, ACETAMINOPHEN AND CAFFEINE 50; 325; 40 MG/1; MG/1; MG/1
1 TABLET ORAL EVERY 4 HOURS PRN
Status: DISCONTINUED | OUTPATIENT
Start: 2017-01-15 | End: 2017-01-16 | Stop reason: HOSPADM

## 2017-01-15 RX ORDER — ATORVASTATIN CALCIUM 20 MG/1
20 TABLET, FILM COATED ORAL DAILY
Status: DISCONTINUED | OUTPATIENT
Start: 2017-01-16 | End: 2017-01-16 | Stop reason: HOSPADM

## 2017-01-15 RX ORDER — METOCLOPRAMIDE HYDROCHLORIDE 5 MG/ML
5 INJECTION INTRAMUSCULAR; INTRAVENOUS ONCE
Status: COMPLETED | OUTPATIENT
Start: 2017-01-15 | End: 2017-01-15

## 2017-01-15 RX ORDER — METOCLOPRAMIDE HYDROCHLORIDE 5 MG/ML
10 INJECTION INTRAMUSCULAR; INTRAVENOUS EVERY 6 HOURS PRN
Status: DISCONTINUED | OUTPATIENT
Start: 2017-01-15 | End: 2017-01-16 | Stop reason: HOSPADM

## 2017-01-15 RX ORDER — METOCLOPRAMIDE HYDROCHLORIDE 5 MG/ML
10 INJECTION INTRAMUSCULAR; INTRAVENOUS EVERY 6 HOURS
Status: DISCONTINUED | OUTPATIENT
Start: 2017-01-15 | End: 2017-01-15

## 2017-01-15 RX ADMIN — METOCLOPRAMIDE 10 MG: 5 INJECTION, SOLUTION INTRAMUSCULAR; INTRAVENOUS at 11:01

## 2017-01-15 RX ADMIN — METOCLOPRAMIDE 5 MG: 5 INJECTION, SOLUTION INTRAMUSCULAR; INTRAVENOUS at 02:01

## 2017-01-15 RX ADMIN — BUTALBITAL, ACETAMINOPHEN, AND CAFFEINE 1 TABLET: 50; 325; 40 TABLET ORAL at 02:01

## 2017-01-15 RX ADMIN — PROMETHAZINE HYDROCHLORIDE 25 MG: 25 INJECTION, SOLUTION INTRAMUSCULAR; INTRAVENOUS at 12:01

## 2017-01-15 RX ADMIN — MORPHINE SULFATE 4 MG: 4 INJECTION INTRAVENOUS at 12:01

## 2017-01-15 RX ADMIN — PROMETHAZINE HYDROCHLORIDE 25 MG: 25 INJECTION, SOLUTION INTRAMUSCULAR; INTRAVENOUS at 07:01

## 2017-01-15 NOTE — PT/OT/SLP PROGRESS
Physical Therapy      Renée Goff  MRN: [id number]    Patient not seen today secondary to pt very lethargic, not feeling well, refused.  . Will follow-up 1/16/17.    Art Martínez, PT

## 2017-01-15 NOTE — PLAN OF CARE
Met with patient and spouse @ bedside, spouse answered discharge planning questions, patient asleep. CM verified insurance and demographic information. PCP is Dr. Thomas Khan at [facility], pharmacy is [facility] on [address] [facility], she is independent, drives and does not use any equipment at home. No dc needs at this time. No POA or living will.      01/15/17 1537   Discharge Assessment   Assessment Type Discharge Planning Assessment   Confirmed/corrected address and phone number on facesheet? Yes   Assessment information obtained from? Caregiver  (patient asleep)   Prior to hospitilization cognitive status: Alert/Oriented  (spouse reports she is alert and oriented)   Prior to hospitalization functional status: Independent   Current cognitive status: Alert/Oriented  (spouse reports she is alert and oriented)   Current Functional Status: Independent   Arrived From home or self-care   Lives With spouse   Able to Return to Prior Arrangements yes   Is patient able to care for self after discharge? Yes   How many people do you have in your home that can help with your care after discharge? 1   Who are your caregiver(s) and their phone number(s)? spouse Cosme [phone]   Patient's perception of discharge disposition home or selfcare   Readmission Within The Last 30 Days no previous admission in last 30 days   Patient currently being followed by outpatient case management? No   Patient currently receives home health services? No   Does the patient currently use HME? No   Patient currently receives private duty nursing? No   Patient currently receives any other outside agency services? No   Equipment Currently Used at Home none   Do you have any problems affording any of your prescribed medications? No   Is the patient taking medications as prescribed? yes   Do you have any financial concerns preventing you from receiving the healthcare you need? No   Does the patient have transportation to healthcare  appointments? Yes   Transportation Available car   On Dialysis? No   Does the patient receive services at the Coumadin Clinic? No   Are there any open cases? No   Discharge Plan A Home   Patient/Family In Agreement With Plan yes

## 2017-01-15 NOTE — PROGRESS NOTES
Progress Note  Hospital Medicine  Patient Name:Renée Goff  MRN:  [id number]  Patient Class: OP- Observation  Admit Date: 1/14/2017  Length of Stay: 0 days  Expected Discharge Date:   Attending Physician: Sharon Bynum MD  Primary Care Provider:  Thomas Khan MD    SUBJECTIVE:     Principal Problem: Chest pain, unspecified  Initial history of present illness: Patient is a 51 y.o. female admitted to Hospitalist Service from [facility] Room with complaint of chest pain for 1 day. Patient reportedly has past medical history significant for hypertension, CAD, history of AMI and CVA. Patient reported chest pain started last night which is radiating to arms and upper back. Patient has also been experiencing headache. No head injury, LOC or seizure activity reported. Patient denied any focal deficits. No associated nausea or diaphorsis reported. No leg swelling of calf tenderness. Patient denied shortness of breath, abdominal pain, nausea, vomiting, headache, vision changes, focal neuro-deficits, cough or fever.    PMH/PSH/SH/FH/Meds: reviewed.    Symptoms/Review of Systems: Complaining of heache and right sided weakness. No shortness of breath, cough, chest pain or headache, fever or abdominal pain.     Diet:  Adequate intake.    Activity level: Normal.    Pain:  Patient reports no pain.       OBJECTIVE:   Vital Signs (Most Recent):      Temp: 97.7 °F (36.5 °C) (01/15/17 0754)  Pulse: 75 (01/15/17 0754)  Resp: 18 (01/15/17 0754)  BP: 107/71 (01/15/17 0754)  SpO2: 100 % (01/15/17 0754)       Vital Signs Range (Last 24H):  Temp:  [97.6 °F (36.4 °C)-99.2 °F (37.3 °C)]   Pulse:  [69-95]   Resp:  [18]   BP: (107-131)/(63-85)   SpO2:  [98 %-100 %]     Weight: 76.7 kg (169 lb)  Body mass index is 29.01 kg/(m^2).    Intake/Output Summary (Last 24 hours) at 01/15/17 0939  Last data filed at 01/14/17 2300   Gross per 24 hour   Intake               60 ml   Output                0 ml   Net                60 ml     Physical Examination:  General appearance: well developed, appears stated age, chronically ill-appearing  Head: normocephalic, atraumatic  Eyes: conjunctivae/corneas clear. PERRL.  Nose: Nares normal. Septum midline.  Throat: lips, mucosa, and tongue normal; teeth and gums normal, no throat erythema.  Neck: supple, symmetrical, trachea midline, no JVD and thyroid not enlarged, symmetric, no tenderness/mass/nodules  Lungs: clear to auscultation bilaterally and normal respiratory effort  Chest wall: no tenderness  Heart: regular rate and rhythm, S1, S2 normal, no murmur, click, rub or gallop  Abdomen: soft, non-tender non-distented; bowel sounds normal; no masses, no organomegaly  Extremities: no cyanosis, clubbing or edema.   Pulses: 2+ and symmetric  Skin: Skin color, texture, turgor normal. No rashes or lesions.  Lymph nodes: Cervical, supraclavicular, and axillary nodes normal.  Neurologic: Right hemifacial weakness, not able to open right eye completely, right hemiparesis 3-4/5 (worse than before per patient).  Laboratory:  CBC:    Recent Labs  Lab 01/14/17  0845 01/15/17  0340   WBC 7.20 8.40   RBC 4.38 4.29   HGB 13.0 12.7   HCT 38.4 38.2    250   MCV 88 89   MCH 29.6 29.6   MCHC 33.8 33.3   BMP    Recent Labs  Lab 01/14/17  0845 01/15/17  0340   GLU 92 117*    141   K 4.3 5.0    104   CO2 24 28   BUN 8 11   CREATININE 0.9 0.8   CALCIUM 10.1 10.2   MG  --  2.4        Diagnostic Results:  MRI Brain: Negative MRI of the brain.  Chronic right maxillary and ethmoid sinusitis.    Chest X-Ray: Prior sternotomy otherwise negative portable chest     CT Head Negative Head CT.    Carotid US: Significant atherosclerotic plaque or calcification is not identified in the carotid arteries by real-time ultrasound or NASCET criteria     ECHO: Pending.    Lexiscan (11-13-17): 1. The perfusion scan is free of evidence for myocardial ischemia or injury.   2. There is a moderate intensity fixed defect  in the anteroapical wall of the left ventricle, secondary to breast attenuation.   3. Resting wall motion is physiologic.   4. There is resting LV dysfunction with a reduced ejection fraction of 53 %.  (normal is 55 - 69)  5. The ventricular volumes are normal at rest and stress.   6. The extracardiac distribution of radioactivity is normal.     Microbiology Results (last 7 days)     ** No results found for the last 168 hours. **           Assessment/Plan:     Active Hospital Problems    Diagnosis  POA    *Chest pain, unspecified [R07.9]  Yes    MI (myocardial infarction) [I21.3]  Yes    Right sided weakness - Complex Migraine Vs Conversion disorder  Yes    Migraine [G43.909]  Yes    CVA (cerebral vascular accident), x2, 1996 [I63.9]  Yes    Abnormal EKG [R94.31]  Yes    KIRA (generalized anxiety disorder) [F41.1]  Yes      Resolved Hospital Problems    Diagnosis Date Resolved POA   No resolved problems to display.   Supplemental O2 via nasal canula; titrate O2 saturation to >92%.  Tele-monitoring.   Cardiology Consultation.  Use Nitroglycerine PRN Chest pain.  Neurochecks.  Fall and seizure precautions.  Cotinue Aspirin 325 mg po q day. No t-PA given.  Neurology consult.  MRI Brain.  2 D ECHO for evaluation of intra-cardiac thrombus or valvular heart disease.  Check Lipid Profile.  Consult Physical Therapy and Occupational therapy for evaluation and treatment.  Gastric ulcer prophylaxis with gastric acid suppressant.   Deep venous thrombosis prophylaxis. Discussed with patient's  and children. See Orders.  VTE Risk Mitigation         Ordered     Medium Risk of VTE  Once      01/14/17 1120     Place ELISSA hose  Until discontinued      01/14/17 1120        Anticipated Disposition: Home or Self Care    Santos Sanz MD  Department of Hospital Medicine  [facility]

## 2017-01-15 NOTE — PLAN OF CARE
Problem: Patient Care Overview  Goal: Plan of Care Review  Outcome: Ongoing (interventions implemented as appropriate)  VSS. POC reviewed, pt verbalized understanding. Spouse at bedside. PRN zofran/phenergan given for nausea. Pt with multiple episodes of vomitting- good relief from phenergan. PRN morphine given for pain.Tele in place-SR. HOB 30-45 for aspiration precautions. Bed in lowest position, wheels locked, SR upx2, call light within reach. Will continue to monitor.

## 2017-01-15 NOTE — CONSULTS
"Date of service:  1/14/2017    Chief complaint:  Possible stroke    History of present illness:  The patient is a 51 y.o. female who we have been asked to see for possible stroke. Their symptoms began on the day of admission. The deficit is right sided weakness.  It is characterized as a lack of strength and is severe in intensity.  There are no clear exacerbating or relieving factors.  She reports associated headache, chest pain, and slurred speech.  The symptoms are present continuously.      Past Medical History   Diagnosis Date    MI (myocardial infarction)     Stroke        Past Surgical History   Procedure Laterality Date    Coronary artery bypass graft         Family History   Problem Relation Age of Onset    Heart attack Maternal Grandfather        Social History     Social History    Marital status:      Spouse name: N/A    Number of children: N/A    Years of education: N/A     Occupational History    Not on file.     Social History Main Topics    Smoking status: Never Smoker    Smokeless tobacco: Never Used    Alcohol use No    Drug use: No    Sexual activity: Yes     Partners: Male     Other Topics Concern    Not on file     Social History Narrative       Review of Systems:  A 14 system review is documented in the patient's admission H&P.  It is noteworthy as above.  It is otherwise negative.  Please see the patient's chart for further details.    Physical exam:  Visit Vitals    /71 (BP Location: Right arm, Patient Position: Sitting, BP Method: Automatic)    Pulse 75    Temp 97.7 °F (36.5 °C)    Resp 18    Ht 5' 4" (1.626 m)    Wt 76.7 kg (169 lb)    LMP 10/12/2012    SpO2 100%    Breastfeeding No    BMI 29.01 kg/m2     General: Well developed, well nourished.  No acute distress.  HEENT: Atraumatic, normocephalic.  Neck: Supple, trachea midline.  Cardiovascular: Regular rate and rhythm.  Pulmonary: No increased work of breathing.  Abdomen/GI: No " guarding.  Musculoskeletal: No obvious joint deformities, moves all extremities well.    Neurological exam:  Mental status: Somnolent (patient had received morphine prior to exam)  Cranial nerves: Limited participation.  Pupils equal round and reactive to light, extraocular movements grossly intact, facial sensation intact bilaterally, hearing grossly intact bilaterally.  Motor: Limited participation.  Questionable right sided weakness, though effort was inconsistent.  Strength appeared 5 out of 5 throughout the upper and lower extremities on the left. Normal bulk and tone.  Sensation: Intact to light touch and temperature bilaterally.  DTR: 1+ at the knees and biceps bilaterally.  Coordination: Patient not participatory.  Gait: Patient not participatory.    Data base:  Chart reviewed.      Labs:  CBC: unremarkable  CMP: wnl    MRI brain:  No acute intracranial process  I independently visualized and interpreted this study.     Carotid U/S:  Pending    TTE:  Pending    Assessment and plan:  The patient is a 51 y.o. female who presents for evaluation for possible stroke.  At this point, I feel we have ruled this out.  This may be a complex migraine.  Conversion disorder is also on the differential.  Given the limited exam due to the patient being somnolent from morphine, it is difficult to be certain.  In any case, I think that it would be reasonable to treat her headache as a migraine and provide supportive care while assessing her chest pain.  In this setting, I would avoid triptans.

## 2017-01-16 VITALS
DIASTOLIC BLOOD PRESSURE: 65 MMHG | TEMPERATURE: 99 F | HEART RATE: 81 BPM | WEIGHT: 169 LBS | BODY MASS INDEX: 28.85 KG/M2 | OXYGEN SATURATION: 98 % | RESPIRATION RATE: 18 BRPM | SYSTOLIC BLOOD PRESSURE: 103 MMHG | HEIGHT: 64 IN

## 2017-01-16 LAB
ANION GAP SERPL CALC-SCNC: 10 MMOL/L
BASOPHILS # BLD AUTO: 0 K/UL
BASOPHILS NFR BLD: 0.5 %
BUN SERPL-MCNC: 16 MG/DL
CALCIUM SERPL-MCNC: 9.7 MG/DL
CHLORIDE SERPL-SCNC: 101 MMOL/L
CO2 SERPL-SCNC: 28 MMOL/L
CREAT SERPL-MCNC: 0.8 MG/DL
DIASTOLIC DYSFUNCTION: NO
DIFFERENTIAL METHOD: ABNORMAL
EOSINOPHIL # BLD AUTO: 0.3 K/UL
EOSINOPHIL NFR BLD: 3.4 %
ERYTHROCYTE [DISTWIDTH] IN BLOOD BY AUTOMATED COUNT: 13.5 %
EST. GFR  (AFRICAN AMERICAN): >60 ML/MIN/1.73 M^2
EST. GFR  (NON AFRICAN AMERICAN): >60 ML/MIN/1.73 M^2
GLUCOSE SERPL-MCNC: 93 MG/DL
HCT VFR BLD AUTO: 35 %
HGB BLD-MCNC: 11.8 G/DL
LYMPHOCYTES # BLD AUTO: 3.1 K/UL
LYMPHOCYTES NFR BLD: 36.6 %
MAGNESIUM SERPL-MCNC: 2.4 MG/DL
MCH RBC QN AUTO: 29.8 PG
MCHC RBC AUTO-ENTMCNC: 33.7 %
MCV RBC AUTO: 89 FL
MONOCYTES # BLD AUTO: 0.8 K/UL
MONOCYTES NFR BLD: 10 %
NEUTROPHILS # BLD AUTO: 4.2 K/UL
NEUTROPHILS NFR BLD: 49.5 %
PLATELET # BLD AUTO: 240 K/UL
PMV BLD AUTO: 7.5 FL
POTASSIUM SERPL-SCNC: 3.7 MMOL/L
RBC # BLD AUTO: 3.96 M/UL
SODIUM SERPL-SCNC: 139 MMOL/L
WBC # BLD AUTO: 8.5 K/UL

## 2017-01-16 PROCEDURE — 99217 PR OBSERVATION CARE DISCHARGE: CPT | Mod: ,,, | Performed by: INTERNAL MEDICINE

## 2017-01-16 PROCEDURE — G8980 MOBILITY D/C STATUS: HCPCS | Mod: CK

## 2017-01-16 PROCEDURE — 83735 ASSAY OF MAGNESIUM: CPT

## 2017-01-16 PROCEDURE — 80048 BASIC METABOLIC PNL TOTAL CA: CPT

## 2017-01-16 PROCEDURE — 25000003 PHARM REV CODE 250: Performed by: INTERNAL MEDICINE

## 2017-01-16 PROCEDURE — G0378 HOSPITAL OBSERVATION PER HR: HCPCS

## 2017-01-16 PROCEDURE — G8979 MOBILITY GOAL STATUS: HCPCS | Mod: CH

## 2017-01-16 PROCEDURE — 97110 THERAPEUTIC EXERCISES: CPT

## 2017-01-16 PROCEDURE — G8987 SELF CARE CURRENT STATUS: HCPCS | Mod: CK

## 2017-01-16 PROCEDURE — G9158 MOTOR SPEECH D/C STATUS: HCPCS | Mod: CI

## 2017-01-16 PROCEDURE — G9166 ATTEN GOAL STATUS: HCPCS | Mod: CI

## 2017-01-16 PROCEDURE — 97535 SELF CARE MNGMENT TRAINING: CPT

## 2017-01-16 PROCEDURE — 25000003 PHARM REV CODE 250: Performed by: EMERGENCY MEDICINE

## 2017-01-16 PROCEDURE — G9165 ATTEN CURRENT STATUS: HCPCS | Mod: CJ

## 2017-01-16 PROCEDURE — 36415 COLL VENOUS BLD VENIPUNCTURE: CPT

## 2017-01-16 PROCEDURE — G8978 MOBILITY CURRENT STATUS: HCPCS | Mod: CK

## 2017-01-16 PROCEDURE — G8988 SELF CARE GOAL STATUS: HCPCS | Mod: CJ

## 2017-01-16 PROCEDURE — 63600175 PHARM REV CODE 636 W HCPCS

## 2017-01-16 PROCEDURE — 97162 PT EVAL MOD COMPLEX 30 MIN: CPT

## 2017-01-16 PROCEDURE — 97165 OT EVAL LOW COMPLEX 30 MIN: CPT

## 2017-01-16 PROCEDURE — 85025 COMPLETE CBC W/AUTO DIFF WBC: CPT

## 2017-01-16 PROCEDURE — 92523 SPEECH SOUND LANG COMPREHEN: CPT

## 2017-01-16 RX ORDER — ASPIRIN 325 MG
325 TABLET ORAL DAILY
Refills: 0 | COMMUNITY
Start: 2017-01-16 | End: 2019-05-07

## 2017-01-16 RX ORDER — ATORVASTATIN CALCIUM 20 MG/1
20 TABLET, FILM COATED ORAL DAILY
Qty: 30 TABLET | Refills: 0 | Status: SHIPPED | OUTPATIENT
Start: 2017-01-16 | End: 2017-02-13 | Stop reason: SDUPTHER

## 2017-01-16 RX ORDER — BUTALBITAL, ACETAMINOPHEN AND CAFFEINE 50; 325; 40 MG/1; MG/1; MG/1
1 TABLET ORAL EVERY 4 HOURS PRN
Qty: 15 TABLET | Refills: 0 | Status: SHIPPED | OUTPATIENT
Start: 2017-01-16 | End: 2017-02-15

## 2017-01-16 RX ORDER — REGADENOSON 0.08 MG/ML
INJECTION, SOLUTION INTRAVENOUS
Status: COMPLETED
Start: 2017-01-16 | End: 2017-01-16

## 2017-01-16 RX ADMIN — BUTALBITAL, ACETAMINOPHEN, AND CAFFEINE 1 TABLET: 50; 325; 40 TABLET ORAL at 12:01

## 2017-01-16 RX ADMIN — ATORVASTATIN CALCIUM 20 MG: 20 TABLET, FILM COATED ORAL at 08:01

## 2017-01-16 RX ADMIN — BUTALBITAL, ACETAMINOPHEN, AND CAFFEINE 1 TABLET: 50; 325; 40 TABLET ORAL at 08:01

## 2017-01-16 RX ADMIN — REGADENOSON: 0.08 INJECTION, SOLUTION INTRAVENOUS at 10:01

## 2017-01-16 RX ADMIN — ASPIRIN 325 MG ORAL TABLET 325 MG: 325 PILL ORAL at 08:01

## 2017-01-16 RX ADMIN — PANTOPRAZOLE SODIUM 40 MG: 40 TABLET, DELAYED RELEASE ORAL at 08:01

## 2017-01-16 NOTE — DISCHARGE SUMMARY
Discharge Summary  Hospital Medicine    Admit Date: 1/14/2017    Date and Time: 1/16/20171:43 PM    Discharge Attending Physician: Sharon Bynum MD    Primary Care Physician: Thomas Khan MD    Diagnoses:  Active Hospital Problems    Diagnosis  POA    *Chest pain, unspecified [R07.9]  Yes    MI (myocardial infarction) [I21.3]  Yes    Right sided weakness (rule out new CVA) [M62.81]  Yes    Migraine [G43.909]  Yes    Cerebrovascular accident (CVA) due to embolism of left middle cerebral artery [I63.412]  Yes    Abnormal EKG [R94.31]  Yes    KIRA (generalized anxiety disorder) [F41.1]  Yes      Resolved Hospital Problems    Diagnosis Date Resolved POA   No resolved problems to display.     Discharged Condition: Good    Hospital Course:   Patient is a 51 y.o. female admitted to Hospitalist Service from [facility] Emergency Room with complaint of chest pain for 1 day. Patient reportedly has past medical history significant for hypertension, CAD, history of AMI and CVA. Patient reported chest pain started last night which was radiating to arms and upper back. Patient has also been experiencing headache. No head injury, LOC or seizure activity reported. Patient denied any focal deficits. No associated nausea or diaphorsis reported. No leg swelling of calf tenderness. Patient denied shortness of breath, abdominal pain, nausea, vomiting, headache, vision changes, focal neuro-deficits, cough or fever. Patient was admitted to Hospitalist medicine service. Patient was evaluated by Dr. Zamora and Dr. Chavira. Patient was monitored on telemetry medical floor, serial cardiac enzymes remained negative. Patient was evaluated by cardiologist and had further cardiac workup as mentioned below, which was negative for acute ischemia. Patient's symptoms resolved. MRI brain did not confirm CVA. Neurological symptoms resolved. Patient encouraged to follow up closely and have amnesia work up completed. Patient was  discharged home in stable condition with following discharge plan of care.     Consults: Dr. Zamora and Dr. Fan Montes:  1. The perfusion scan is free of evidence for myocardial ischemia or injury.   2. There is abnormal wall motion at rest showing moderate hypokinesis of the septal wall of the left ventricle.   3. Resting LV function is normal.  (normal is 55 - 69)  4. The ventricular volumes are normal at rest and stress.   5. The extracardiac distribution of radioactivity is normal.   6. There is evidence of right ventricular hypertrophy.   7. When compared to the previous study from 11/13/2012, the RV appears more prominent on current study.    MRI Brain: Negative MRI of the brain.  Chronic right maxillary and ethmoid sinusitis.     Chest X-Ray: Prior sternotomy otherwise negative portable chest      CT Head Negative Head CT.     Carotid US: Significant atherosclerotic plaque or calcification is not identified in the carotid arteries by real-time ultrasound or NASCET criteria      ECHO:     1 - Low normal left ventricular systolic function (EF 50-55%).     2 - Normal left ventricular diastolic function.     3 - Right ventricle is upper limit of normal in size with normal systolic function.     4 - The estimated PA systolic pressure is greater than 31 mmHg.     5 - Difficult windows, even with the use of Optison contrast.  Unable to adequately assess for discrete wall motion abnormalities .     6 - Compared to echo from 11/13/2012, no significant change.      Lexiscan (11-13-17): 1. The perfusion scan is free of evidence for myocardial ischemia or injury.   2. There is a moderate intensity fixed defect in the anteroapical wall of the left ventricle, secondary to breast attenuation.   3. Resting wall motion is physiologic.   4. There is resting LV dysfunction with a reduced ejection fraction of 53 %.  (normal is 55 - 69)  5. The ventricular volumes are normal at rest and stress.   6. The extracardiac distribution  of radioactivity is normal.      CXR:   1.  No acute abdominopelvic process.  2.  Small hiatal hernia.     CT abdomen:   1.  No acute abdominopelvic process.  2.  Small hiatal hernia.    Significant Diagnostic Studies:     Microbiology Results (last 7 days)     ** No results found for the last 168 hours. **        Special Treatments/Procedures: None  Disposition: Home or Self Care    Medications:  Reconciled Home Medications: Current Discharge Medication List      START taking these medications    Details   aspirin 325 MG tablet Take 1 tablet (325 mg total) by mouth once daily.  Refills: 0      atorvastatin (LIPITOR) 20 MG tablet Take 1 tablet (20 mg total) by mouth once daily.  Qty: 30 tablet, Refills: 0      butalbital-acetaminophen-caffeine -40 mg (FIORICET, ESGIC) -40 mg per tablet Take 1 tablet by mouth every 4 (four) hours as needed for Headaches.  Qty: 15 tablet, Refills: 0         CONTINUE these medications which have NOT CHANGED    Details   alprazolam (XANAX) 0.5 MG tablet Take 0.5 mg by mouth nightly as needed.        amitriptyline (ELAVIL) 10 MG tablet Take 40 mg by mouth every evening.       multivitamin (MULTIVITAMIN) per tablet Take 1 tablet by mouth once daily.        propranolol (INDERAL) 40 MG tablet Take 1 tablet (40 mg total) by mouth 2 (two) times daily.  Qty: 180 tablet, Refills: 3    Associated Diagnoses: Migraine with aura and without status migrainosus, not intractable; Heart palpitations      meloxicam (MOBIC) 7.5 MG tablet Take 7.5 mg by mouth 2 (two) times daily.         STOP taking these medications       aspirin (ECOTRIN) 81 MG EC tablet Comments:   Reason for Stopping:         promethazine-dextromethorphan (PROMETHAZINE-DM) 6.25-15 mg/5 mL Syrp Comments:   Reason for Stopping:               Discharge Procedure Orders  Referral to OutPatient Speech Therapy   Referral Priority: Routine Referral Type: Speech Therapy   Referral Reason: Specialty Services Required    Requested  Specialty: Speech Pathology    Number of Visits Requested: 1      Diet general   Order Comments: Cardiac/ 2 gram sodium low cholesterol diet     Other restrictions (specify):   Order Comments: Fall precautions     Call MD for:   Order Comments: For worsening symptoms, chest pain, shortness of breath, increased abdominal pain, high grade fever, stroke or stroke like symptoms, immediately go to the nearest Emergency Room or call 911 as soon as possible.       Follow-up Information     Follow up with Thomas Khan MD On 1/25/2017.    Specialty:  Family Medicine    Why:  @10am     Contact information:    [address]  [facility] [id number] [phone]          Follow up with Giacomo Chavira MD.    Specialty:  Cardiology    Contact information:    [address] [facility] [address]  [facility] [id number] [phone]          Follow up with Dougie Zamora Jr, MD.    Specialty:  Neurology    Contact information:    [address] [facility] [address]  [phone]

## 2017-01-16 NOTE — PT/OT/SLP EVAL
Occupational Therapy  Evaluation    Renée Goff   MRN: [id number]   Admitting Diagnosis: Chest pain, unspecified    OT Date of Treatment: 17   OT Start Time: 912  OT Stop Time: 944  OT Total Time (min): 32 min    Billable Minutes:  Evaluation 8  Self Care/Home Management 14  Therapeutic Exercise 10    Diagnosis: Chest pain, unspecified   Complex migraine    Past Medical History   Diagnosis Date    MI (myocardial infarction)     Stroke       Past Surgical History   Procedure Laterality Date    Coronary artery bypass graft         Referring physician:   Date referred to OT: 17    General Precautions: Standard, fall  Orthopedic Precautions: N/A  Braces: N/A          Patient History:  Living Environment  Lives With: spouse  Living Arrangements: house  Home Layout:  (no concerns)  Transportation Available: car, family or friend will provide  Living Environment Comment: Pt lives in a H with 0 MICHELLE with her spouse who works but stated he will be home with her as long as needed.  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   IADL Comments: Pt was independent with all ADL/IADLs using only a grab bar.     Dominant hand: right    Subjective:  Communicated with Phonds, nurse prior to session.  Stated patient was cleared for OT today.  Chief Complaint: headache and R eye pain  Patient/Family stated goals: To remember my grandkids.    Pain Ratin/10  Location - Side: Right  Location - Orientation: upper  Location: shoulder  Pain Addressed: Cessation of Activity (Pain present during shoulder  "flexion past 90* and  went away after lowering her arm)  Pain Rating Post-Intervention: 2/10    Objective:  Patient found with: oxygen, telemetry    Cognitive Exam:  Oriented to: Person, Place, Time and Situation  Follows Commands/attention: Follows one-step commands  Communication: clear/fluent  Memory:  Impaired STM and Impaired LTM. Spouse stated that pt "has lost the memory of the past year and a half."  Safety awareness/insight to disability: intact  Coping skills/emotional control: Appropriate to situation    Visual/perceptual:  Intact tracking and acuity though pt's R eye was closing intermittently throughout evaluation & treatment session.     Physical Exam:  Postural examination/scapula alignment: Rounded shoulder  Skin integrity: Visible skin intact and reddened face and hands which concerned pt  Edema: None noted     Sensation:   Impaired  light/touch R distal 2nd-4th fingers    Upper Extremity Range of Motion:  Right Upper Extremity: WFL but with shoulder pain > 90* and pt  held R UE in flexion synergy pattern. Able to actively move arm through ROM when instructed to do so.  Left Upper Extremity: WFL    Upper Extremity Strength:  Right Upper Extremity: 3+/5 throughout  Left Upper Extremity: WFL   Strength: WFL but R weaker than L    Fine motor coordination:   Slowed, deliberate movement with R hand but able to form full grasp and pinch and oppose thumb to each fingertip  Gross motor coordination: WFL    Functional Mobility:  Bed Mobility:  Rolling/Turning to Left: Stand by assistance, With side rail  Scooting/Bridging: Stand by Assistance, With side rail  Supine to Sit: Stand by Assistance, WIth side rail    Transfers:  Sit <> Stand Assistance: Minimum Assistance (hand held assist)  Sit <> Stand Assistive Device: No Assistive Device  Toilet Transfer Technique: Stand Pivot  Toilet Transfer Assistance: Contact Guard Assistance  Toilet Transfer Assistive Device: grab bar    Functional Ambulation: Pt " "walked from bedside to bathroom, then to sink & back to bedside with Min HHA & no LOB noted but pt was anxious/fearful of falling and needed reassurance that she was alright.      Activities of Daily Living:  Feeding Level of Assistance: Activity did not occur    LE Dressing Level of Assistance: Stand by assistance    Grooming Position: Standing at sink (to comb hair and wash hands)  Grooming Level of Assistance: Stand by assistance  Toileting Where Assessed: Toilet  Toileting Level of Assistance: Stand by assistance    Balance:   Static Sit: GOOD: Takes MODERATE challenges from all directions  Dynamic Sit: GOOD: Maintains balance through MODERATE excursions of active trunk movement  Static Stand: FAIR: Maintains without assist but unable to take challenges  Dynamic stand: POOR: N/A    Therapeutic Activities and Exercises:  OT ed pt on OT role & POC as well as discharge recommendations.  OT issued rubber squeeze ball to pt & educated pt on L hand resistive gross grasp HEP. Pt performed 1 set of 15 reps with proper form.  OT ed pt on B shoulder flexion SROM exercise with  UE assisting  UE & pt completing 10 reps with cues and supervision.  OT encouraged pt not to hold R UE in flexion pattern and to use it as much as possible and  stated he would remind pt to do so.    AM-PAC 6 CLICK ADL  How much help from another person does this patient currently need?  1 = Unable, Total/Dependent Assistance  2 = A lot, Maximum/Moderate Assistance  3 = A little, Minimum/Contact Guard/Supervision  4 = None, Modified [place]/Independent    Putting on and taking off regular lower body clothing? : 3  Bathing (including washing, rinsing, drying)?: 3  Toileting, which includes using toilet, bedpan, or urinal? : 3  Putting on and taking off regular upper body clothing?: 3  Taking care of personal grooming such as brushing teeth?: 3  Eating meals?: 3  Total Score: 18    AM-PAC Raw Score CMS "G-Code Modifier Level of Impairment " Assistance   6 % Total / Unable   7 - 9 CM 80 - 100% Maximal Assist   10 - 14 CL 60 - 80% Moderate Assist   15 - 19 CK 40 - 60% Moderate Assist   20 - 22 CJ 20 - 40% Minimal Assist   23 CI 1-20% SBA / CGA   24 CH 0% Independent/ Mod I       Patient left seated at EOB with pt transport present to take pt for a test with all lines intact and Rhonds, nurse notified    Assessment:  Renée Goff is a 51 y.o. female with a medical diagnosis of Chest pain, unspecified and presents with decreased R UE function & decreased functional status. Recommend OT treatment to maximize endurance, safety & independence with ADLs.      Rehab identified problem list/impairments: Rehab identified problem list/impairments: weakness, impaired self care skills, impaired sensation, gait instability, impaired balance, abnormal tone    Rehab potential is excellent.    Activity tolerance: Good    Discharge recommendations: Discharge Facility/Level Of Care Needs: outpatient PT, outpatient OT     Barriers to discharge: Barriers to Discharge: None    Equipment recommendations:  (TBD)     GOALS:   Occupational Therapy Goals        Problem: Occupational Therapy Goal    Goal Priority Disciplines Outcome Interventions   Occupational Therapy Goal     OT, PT/OT Ongoing (interventions implemented as appropriate)    Description:  Goals to be met by: 1/26/17     Patient will increase functional independence with ADLs by performing:    Feeding with Modified [place] and Assistive Devices as needed.  UE Dressing with Set-up Assistance and Supervision.  LE Dressing with Set-up Assistance and Supervision.  Toileting from toilet with Supervision and Assistive Devices as needed for hygiene and clothing management.   Toilet transfer to toilet with Supervision.  Upper extremity exercise program x10 reps per instructions, with assistance as needed.                PLAN:  Patient to be seen 3 x/week, 5 x/week to address the above listed problems via  self-care/home management, therapeutic activities, therapeutic exercises  Plan of Care expires: 01/26/17  Plan of Care reviewed with: patient, spouse    OT G-codes  Functional Assessment Tool Used: [facility]  Score: 18  Functional Limitation: Self care  Self Care Current Status (): CK  Self Care Goal Status (): CJ    ABHISHEK Agudelo  01/16/2017

## 2017-01-16 NOTE — PROGRESS NOTES
Progress Note  Cardiology    Admit Date: 1/14/2017   LOS: 0 days     Follow-up For:  Chest Pain    Scheduled Meds:   aspirin  325 mg Oral Daily    pantoprazole  40 mg Oral Daily     Continuous Infusions:     Review of patient's allergies indicates:  No Known Allergies    SUBJECTIVE:     Interval History: Patient reports chest pain improved today, but still comes on at times.  Continues to have severe headaches.  Troponin negative x 3.  EKG unchanged.  Echo with low normal EF of 50-55% with no significant valvular abnormalities or obvious wall motion abnormalities.  Study unchanged compared to echo in 11/2012.  BP lower today at 102/61.      Review of Systems:  Constitution: Denies chills, fever, and sweats.  HENT: Positive for headaches.  Cardiovascular: Positive for chest pain.  Respiratory: Denies cough or shortness of breath.  Gastrointestinal: Positive for nausea and vomiting.  Musculoskeletal: Denies muscle cramps.  Neurological: Denies dizziness or focal weakness.  Psychiatric/Behavioral: Normal mental status.  Hematologic/Lymphatic: Denies bleeding problem or easy bruising/bleeding.  Skin: Denies rash or suspicious lesions      OBJECTIVE:     Vital Signs (Most Recent)  Temp: 97.6 °F (36.4 °C) (01/15/17 2000)  Pulse: 82 (01/15/17 2000)  Resp: 19 (01/15/17 2000)  BP: 102/61 (01/15/17 2000)  SpO2: 99 % (01/15/17 2000)    Vital Signs Range (Last 24H):  Temp:  [97.1 °F (36.2 °C)-97.7 °F (36.5 °C)]   Pulse:  []   Resp:  [18-19]   BP: (102-121)/(57-73)   SpO2:  [99 %-100 %]     I & O (Last 24H):  Intake/Output Summary (Last 24 hours) at 01/15/17 2032  Last data filed at 01/15/17 1800   Gross per 24 hour   Intake              580 ml   Output                0 ml   Net              580 ml       Physical Exam:  Constitutional: No acute distress, conversant  HEENT: Sclera anicteric, Pupils equal, round and reactive to light, extraocular motions intact, Oropharynx clear  Neck: No JVD, no carotid  bruits  Cardiovascular: regular rate and rhythm, no murmur, rubs or gallops, normal S1/S2  Pulmonary: Clear to auscultation bilaterally  Abdominal: Abdomen soft, nontender, nondistended, positive bowel sounds  Extremities: No lower extremity edema,   Pulses:    Carotid pulses are 2+ on the right side, and 2+ on the left side.    Radial pulses are 2+ on the right side, and 2+ on the left side.    Femoral pulses are 2+ on the right side, and 2+ on the left side.  Skin: No ecchymosis, erythema, or ulcers  Psych: Alert and oriented x 3, appropriate affect  Neuro: CNII-XII intact, no focal deficits      Laboratory:  Chemistry:  Lab Results   Component Value Date     01/15/2017    K 5.0 01/15/2017     01/15/2017    CO2 28 01/15/2017    BUN 11 01/15/2017    CREATININE 0.8 01/15/2017    CREATININE 0.8 11/14/2012    GLUCOSE 74 11/14/2012    CALCIUM 10.2 01/15/2017    CALCIUM 9.6 11/14/2012    BNP 49 01/14/2017     Cardiac Markers:  Lab Results   Component Value Date    TROPONINI 0.016 01/15/2017    TROPONINI 0.013 11/14/2012     Cardiac Markers (Last 3):  Lab Results   Component Value Date    TROPONINI 0.016 01/15/2017    TROPONINI 0.006 01/14/2017    TROPONINI <0.006 01/14/2017    TROPONINI 0.013 11/14/2012    TROPONINI <0.006 11/14/2012    TROPONINI <0.006 11/13/2012     CBC:   Lab Results   Component Value Date    WBC 8.40 01/15/2017    HGB 12.7 01/15/2017    HGB 12.3 11/14/2012    HCT 38.2 01/15/2017    MCV 89 01/15/2017     01/15/2017     Lipids:  Lab Results   Component Value Date    CHOL 227 (H) 01/15/2017    CHOL 227 (H) 01/15/2017    TRIG 83 01/15/2017    TRIG 83 01/15/2017    HDL 59 01/15/2017    HDL 59 01/15/2017     Coagulation:   Lab Results   Component Value Date    INR 1.0 01/14/2017    INR 1.01 11/12/2012    APTT 24.7 11/12/2012       Diagnostic Results:  Labs: Reviewed  ECG: Reviewed  Echo: Reviewed    Echo 1/14/2017:  CONCLUSIONS     1 - Low normal left ventricular systolic function (EF  50-55%).     2 - Normal left ventricular diastolic function.     3 - Right ventricle is upper limit of normal in size with normal systolic function.     4 - The estimated PA systolic pressure is greater than 31 mmHg.     5 - Difficult windows, even with the use of Optison contrast.  Unable to adequately assess for discrete wall motion abnormalities .     6 - Compared to echo from 11/13/2012, no significant change.    ASSESSMENT/PLAN:   Renée Goff is a 51 y.o. female with PMH of GHN, CVA x2, atrial septal defect s/p closure in 1996, migraines, who presents with chest pain. Echo as above.  Troponin negative x 3.  10-Year ASCVD Risk of 1%.     -Nuclear stress tomorrow to evaluate further.    -Continue full dose ASA daily.  -Continue to monitor on telemetry.

## 2017-01-16 NOTE — PLAN OF CARE
Problem: Occupational Therapy Goal  Goal: Occupational Therapy Goal  Goals to be met by: 1/26/17     Patient will increase functional independence with ADLs by performing:    Feeding with Modified [place] and Assistive Devices as needed.  UE Dressing with Set-up Assistance and Supervision.  LE Dressing with Set-up Assistance and Supervision.  Toileting from toilet with Supervision and Assistive Devices as needed for hygiene and clothing management.   Toilet transfer to toilet with Supervision.  Upper extremity exercise program x10 reps per instructions, with assistance as needed.  Outcome: Ongoing (interventions implemented as appropriate)  OT evaluation completed today. Goals & care plan established.     ABHISHEK Vela  1/16/2017

## 2017-01-16 NOTE — PLAN OF CARE
Problem: Patient Care Overview  Goal: Plan of Care Review  PT eval completed and POC initiated

## 2017-01-16 NOTE — PT/OT/SLP EVAL
Physical Therapy  Evaluation    Renée Goff   MRN: [id number]   Admitting Diagnosis: Chest pain, unspecified    PT Received On: 17  PT Start Time: 742     PT Stop Time: 08    PT Total Time (min): 24 min       Billable Minutes:  Evaluation 15 and Therapeutic Exercise 9    Diagnosis: Chest pain, unspecified      Past Medical History   Diagnosis Date    MI (myocardial infarction)     Stroke       Past Surgical History   Procedure Laterality Date    Coronary artery bypass graft         Referring physician:   Date referred to PT: 2017    General Precautions: Standard, fall  Orthopedic Precautions: N/A   Braces:              Patient History:  Lives With: spouse  Living Arrangements: house  Equipment Currently Used at Home: none  DME owned (not currently used): none    Previous Level of Function:  Ambulation Skills: independent  Transfer Skills: independent  ADL Skills: independent  Work/Leisure Activity: independent    Subjective:  Communicated with nurseBeena prior to session.    Chief Complaint: R fingers and toes are tingling, R-sided weakness; pt also reports STM loss all with the onset of this recent migraine (h/o CVA with no reported residual effects)  Patient goals: get back to PLOF    Pain Ratin/10               Pain Rating Post-Intervention: 0/10    Objective:   Patient found with: telemetry     Cognitive Exam:  Oriented to: Person, Place, Time and Situation    Follows Commands/attention: Follows one-step commands  Communication: clear/fluent  Safety awareness/insight to disability: intact    Physical Exam:  Postural examination/scapula alignment: Rounded shoulder    Skin integrity: Visible skin intact  Edema: None noted     Sensation:   Reports tingling in R fingers and toes        Lower Extremity Range of Motion:  Right Lower Extremity: WFL  Left Lower Extremity: WFL    Lower Extremity Strength:  Right Lower Extremity: Deficits: 4-/5  Left Lower Extremity: WNL     Fine motor  coordination:  Impaired  RLE heel shin     Gross motor coordination: WFL    Functional Mobility:  Bed Mobility:  Supine to Sit: Modified Independent, With side rail    Transfers:  Sit <> Stand Assistance: Minimum Assistance  Sit <> Stand Assistive Device: No Assistive Device  Bed <> Chair Technique: Stand Pivot  Bed <> Chair Assistance: Minimum Assistance  Bed <> Chair Assistive Device: No Assistive Device    Gait:   Gait Distance: 250'  Assistance 1: Minimum assistance  Gait Assistive Device: Hand held assist  Gait Deviation(s): decreased ruperto, decreased velocity of limb motion, decreased step length, decreased weight-shifting ability        Balance:   Static Sit: GOOD: Takes MODERATE challenges from all directions  Dynamic Sit: GOOD: Maintains balance through MODERATE excursions of active trunk movement  Static Stand: POOR+: Needs MINIMAL assist to maintain  Dynamic stand: POOR: N/A    Therapeutic Activities and Exercises:  AP, LAQ, hip flex/abd/add x15 reps   Stand pivot to chair following gait and there ex        Patient left up in chair with all lines intact, call button in reach and spouse present.    Assessment:   Renée Goff is a 51 y.o. female with a medical diagnosis of Chest pain, unspecified and presents with R sided weakness, impaired STM and unsteady gait with the onset of a complex migraine. Pt is independent at baseline ans should benefit from PT.    Rehab identified problem list/impairments: Rehab identified problem list/impairments: weakness, impaired self care skills, impaired functional mobilty, gait instability, impaired balance, impaired sensation, abnormal tone    Rehab potential is good.    Activity tolerance: Good    Discharge recommendations: Discharge Facility/Level Of Care Needs: outpatient PT     Barriers to discharge: Barriers to Discharge: None    Equipment recommendations: Equipment Needed After Discharge:  (TBD)     GOALS:   Physical Therapy Goals        Problem: Physical  Therapy Goal    Goal Priority Disciplines Outcome Goal Variances Interventions   Physical Therapy Goal     PT/OT, PT      Description:  Goals to be met by: 2017     Patient will increase functional independence with mobility by performin. Sit to stand transfer with [place] [facility]  2. Bed to chair transfer with [place] [facility]   3. Gait  x >200 feet with [place].                PLAN:    Patient to be seen daily to address the above listed problems via gait training, therapeutic activities, therapeutic exercises  Plan of Care expires: 17  Plan of Care reviewed with: patient, spouse          Bridget Portillo, PT  2017

## 2017-01-16 NOTE — PT/OT/SLP EVAL
Speech Language Pathology Evaluation    Renée Goff   MRN: [id number]   Admitting Diagnosis: Chest pain, unspecified    Diet recommendations: Solid Diet Level: Regular  Liquid Diet Level: Thin     SLP Treatment Date: 01/16/17  Speech Start Time: 1220     Speech Stop Time: 1314     Speech Total (min): 54 min       TREATMENT BILLABLE MINUTES:  Eval 54     Diagnosis: Chest pain, unspecified      Past Medical History   Diagnosis Date    MI (myocardial infarction)     Stroke      Past Surgical History   Procedure Laterality Date    Coronary artery bypass graft         Has the patient been evaluated by SLP for swallowing? : Yes  Keep patient NPO?: No   General Precautions: Standard, aphasia, vision impaired (eyeglasses for distance)          Social Hx: Patient lives with family.    Subjective:  I don't remember Saturday.  Patient goals: home to grandchildren  Per , she is speaking at baseline, but has lost memory     Objective:    Pt seen for ongoing eval of speech-language-cognition.  Eval was begun 2 days ago, but limited due to pt's severe migraine headache.  On that day, was noted to have new right sided facial, labial & lingual changes.  Today, she was found at baseline per , alert, cooperative & without headache.    Oral Musculature Evaluation  Oral Musculature: WNL  Dentition: present and adequate  Mucosal Quality: good  Mandibular Strength and Mobility: WNL  Oral Labial Strength and Mobility: WFL  Lingual Strength and Mobility: WNL  Velar Elevation: WNL  Voice Prior to PO Intake: clear, low volume     Cognitive Status:  Behavioral Observations: alert and appropriate-  Memory and Orientation: intact orientation x4 except current address (see note); mild immediate, moderate short term/delayed memory deficits.  Intact long term memory except gap in the last 1 1/2 years per  (does not remember current address, this year's presidential election cycle, important family related  events)  Attention: mild deficits with complex information.  Problem Solving: mild deficits  Pragmatics: intact  Executive Function: mild processing delays    Elma Cognitive Assessment (MoCA) score (adjusted for education level -  25 out of 30, indicating mild cognitive-linguistic deficits    Language:   Auditory Comprehension: mild deficits with increasing complexity  Verbal Expression: intact except repetition of complex info (related to immediate memory/attention)    Motor Speech: no apraxia; mild dysfluencies with initial sound repetitions & distortions (per  since CVA 20 yrs ago); WFL rate & prosody    Voice: WNL quality, breath support & phonation    Reading: intact roal reading & comprehension to complex paragraphs    Writing: intact with R dominant hand, very legible    Visual-Spatial: no apparent field cuts or neglect    Additional Treatment:  Observed pt at lunch, eating regular textures, & drinking thin liquids via straw with no s/s oropharyngeal dysphagia    Education provided to pt &  re findings, recommendations for OP cognitive-linguistic tx, questions answered.    Assessment:  Renée Goff is a 51 y.o. female with a SLP diagnosis of Cognitive-Linguistic Impairment. She present with mild cognitive-linguistic deficits.  She would benefit from cognitive-linguistic therapy.     Discharge recommendations: Discharge Facility/Level Of Care Needs: outpatient speech therapy     Goals:   SLP Goals     Not on file           Plan:   Plan of Care reviewed with: patient, spouse  SLP Follow-up?: Yes  SLP - Next Visit Date: 01/16/17           Stefanie Crespo CCC-SLP/A  01/16/2017

## 2017-01-16 NOTE — PROGRESS NOTES
"[facility]  Cardiology  Progress Note    Patient Name: Renée Goff  MRN: [id number]  Admission Date: 1/14/2017  Hospital Length of Stay: 0 days  Code Status: Full Code   Attending Physician: Sharon Bynum MD   Primary Care Physician: Thomas Khan MD  Expected Discharge Date:   Principal Problem:Chest pain, unspecified    Subjective:     Hospital Course:    Interval History: Alert. NAD.  No CP since last night.  No elevation in troponin x 4.  Admits to nausea and vomiting and right sided migraine headache.  Zofran provides some relief of nausea.  SR on telemetry, rates mostly in 70s with occasional PVCs, bigeminy and trigeminy noted.  Patient with few brief episodes of tachycardia with rates into 120s.     Review of Systems   Constitution: Denies chills, fever, and sweats.  HENT: Positive for headaches.  Cardiovascular: Chest pain resolved.  Denies palpitations and syncope.   Respiratory: Denies cough or shortness of breath.  Gastrointestinal: Denies abdominal pain. Positive for nausea and vomiting.   Musculoskeletal: Denies muscle cramps.  Neurological: Denies dizziness or focal weakness.  Psychiatric/Behavioral: Normal mental status. Postive for "memory problems"   Skin: Denies rash or suspicious lesions    Objective:     Vital Signs (Most Recent):  Temp: 98.8 °F (37.1 °C) (01/16/17 0741)  Pulse: 81 (01/16/17 0741)  Resp: 18 (01/16/17 0741)  BP: 103/65 (01/16/17 0741)  SpO2: 98 % (01/16/17 0741) Vital Signs (24h Range):  Temp:  [97.1 °F (36.2 °C)-98.8 °F (37.1 °C)] 98.8 °F (37.1 °C)  Pulse:  [] 81  Resp:  [18-19] 18  SpO2:  [98 %-100 %] 98 %  BP: ()/(55-65) 103/65     Weight: 76.7 kg (169 lb)  Body mass index is 29.01 kg/(m^2).    SpO2: 98 %  O2 Device (Oxygen Therapy): nasal cannula      Intake/Output Summary (Last 24 hours) at 01/16/17 1207  Last data filed at 01/16/17 0700   Gross per 24 hour   Intake              440 ml   Output                0 ml   Net              " 440 ml       Lines/Drains/Airways     Peripheral Intravenous Line                 Peripheral IV - Single Lumen 01/14/17 0835 Left Antecubital 2 days                Physical Exam  Constitutional: No acute distress, conversant  HEENT: Sclera anicteric, Pupils equal, round and reactive to light, extraocular motions intact.   Neck: No JVD, + full ROM  Cardiovascular: regular rate and rhythm, no murmur, rubs or gallops, normal S1/S2  Pulmonary: Respirations even and unlabored.  Clear to auscultation bilaterally  Abdominal: Abdomen soft, nontender, nondistended, positive bowel sounds  Extremities: No lower extremity edema,   Pulses:    Radial pulses are 2+ on the right side, and 2+ on the left side.    DP pulses are 2+ on the right side, and 2+ on the left side.  Skin: No ecchymosis, erythema, or ulcers  Psych: Alert and oriented x 3, appropriate affect    Significant Labs:   BMP:   Glucose (mg/dL)   Date/Time Value Status   01/16/2017 04:41 AM 93 Final   01/15/2017 03:40  (H) Final   01/14/2017 08:45 AM 92 Final     Sodium (mmol/L)   Date/Time Value Status   01/16/2017 04:41  Final   01/15/2017 03:40  Final   01/14/2017 08:45  Final     Potassium (mmol/L)   Date/Time Value Status   01/16/2017 04:41 AM 3.7 Final   01/15/2017 03:40 AM 5.0 Final   01/14/2017 08:45 AM 4.3 Final     Chloride (mmol/L)   Date/Time Value Status   01/16/2017 04:41  Final   01/15/2017 03:40  Final   01/14/2017 08:45  Final     CO2 (mmol/L)   Date/Time Value Status   01/16/2017 04:41 AM 28 Final   01/15/2017 03:40 AM 28 Final   01/14/2017 08:45 AM 24 Final     BUN, Bld (mg/dL)   Date/Time Value Status   01/16/2017 04:41 AM 16 Final   01/15/2017 03:40 AM 11 Final   01/14/2017 08:45 AM 8 Final     Creatinine (mg/dL)   Date/Time Value Status   01/16/2017 04:41 AM 0.8 Final   01/15/2017 03:40 AM 0.8 Final   01/14/2017 08:45 AM 0.9 Final   11/14/2012 05:52 AM 0.8 Final   11/12/2012 07:27 PM 0.8 Final     Calcium  (mg/dL)   Date/Time Value Status   01/16/2017 04:41 AM 9.7 Final   01/15/2017 03:40 AM 10.2 Final   01/14/2017 08:45 AM 10.1 Final   11/14/2012 05:52 AM 9.6 Final   11/12/2012 07:27 PM 10.1 Final     Magnesium (mg/dL)   Date/Time Value Status   01/16/2017 04:41 AM 2.4 Final   01/15/2017 03:40 AM 2.4 Final   , CBC   WBC (K/uL)   Date/Time Value Status   01/16/2017 04:42 AM 8.50 Final   01/15/2017 03:40 AM 8.40 Final   01/14/2017 08:45 AM 7.20 Final     Hgb (g/dl)   Date/Time Value Status   11/14/2012 05:52 AM 12.3 Final   11/12/2012 07:27 PM 12.4 Final     Hemoglobin (g/dL)   Date/Time Value Status   01/16/2017 04:42 AM 11.8 (L) Final   01/15/2017 03:40 AM 12.7 Final   01/14/2017 08:45 AM 13.0 Final     Hematocrit (%)   Date/Time Value Status   01/16/2017 04:42 AM 35.0 (L) Final     Platelets (K/uL)   Date/Time Value Status   01/16/2017 04:42  Final   01/15/2017 03:40  Final   01/14/2017 08:45  Final   , Lipid Panel   Cholesterol (mg/dL)   Date/Time Value Status   01/15/2017 03:39  (H) Final   01/15/2017 03:39  (H) Final   04/27/2012 03:46  (H) Final     HDL (mg/dL)   Date/Time Value Status   01/15/2017 03:39 AM 59 Final   01/15/2017 03:39 AM 59 Final   11/14/2012 05:52 AM 63 Final     LDL Calculated (MG/DL)   Date/Time Value Status   11/14/2012 05:52  Final     LDL Cholesterol (mg/dL)   Date/Time Value Status   01/15/2017 03:39 .4 Final   01/15/2017 03:39 .4 Final   04/27/2012 03:46 .0 Final     Triglycerides (mg/dL)   Date/Time Value Status   01/15/2017 03:39 AM 83 Final   01/15/2017 03:39 AM 83 Final   04/27/2012 03:46  Final     HDL/Chol Ratio (%)   Date/Time Value Status   01/15/2017 03:39 AM 26.0 Final   01/15/2017 03:39 AM 26.0 Final   04/27/2012 03:46 PM 27.5 Final    and Troponin   Troponin I (ng/mL)   Date/Time Value Status   01/15/2017 03:40 AM 0.016 Final   01/14/2017 09:40 PM 0.006 Final   01/14/2017 02:52 PM <0.006 Final   11/14/2012  05:52 AM 0.013 Final   11/14/2012 12:12 AM <0.006 Final   11/13/2012 06:37 PM <0.006 Final       Significant Imaging:   NM Stress Test/Lexiscan 01/16/2017:  EKG Conclusions:  1. The EKG portion of this study is negative for ischemia at a peak heart rate of 129 bpm (81% of predicted).   2. Specificity is reduced secondary to resting ST segment changes.   3. Blood pressure remained stable throughout the protocol  (Presenting BP: 109/75 Peak BP: 109/75).   4. The following arrhythmias were present: couplets.   5. The patient reported dyspnea during the protocol which resolved in recovery.     Nuclear Quantitative Functional Analysis:   LVEF: 55 % (normal is 55 - 69)  LVED Volume: 76 ml (normal is 60 - 98)  LVES Volume: 34 ml (normal is 20 - 42)    Impression: NORMAL MYOCARDIAL PERFUSION  1. The perfusion scan is free of evidence for myocardial ischemia or injury.   2. There is abnormal wall motion at rest showing moderate hypokinesis of the septal wall of the left ventricle.   3. Resting LV function is normal.  (normal is 55 - 69)  4. The ventricular volumes are normal at rest and stress.   5. The extracardiac distribution of radioactivity is normal.   6. There is evidence of right ventricular hypertrophy.   7. When compared to the previous study from 11/13/2012, the RV appears more prominent on current study.    Echo 01/14/2017:  CONCLUSIONS   :  1 - Low normal left ventricular systolic function (EF 50-55%).     2 - Normal left ventricular diastolic function.     3 - Right ventricle is upper limit of normal in size with normal systolic function.     4 - The estimated PA systolic pressure is greater than 31 mmHg.     5 - Difficult windows, even with the use of Optison contrast.  Unable to adequately assess for discrete wall motion abnormalities .     6 - Compared to echo from 11/13/2012, no significant change.    CTA abdomen and pelvis 01/15/2017:  FINDINGS: The liver, gallbladder, pancreas, adrenal glands, and kidneys  are unremarkable.  There is a small hiatal hernia.  The small bowel is normal in caliber.  There is no evidence for appendicitis.  The colon is unremarkable.  No free air, free fluid, or lymphadenopathy.  The aorta is normal caliber.  The mild atelectasis is present at the lung bases.    1.  No acute abdominopelvic process.  2.  Small hiatal hernia.    Bilateral Carotid US 01/14/2017:  Significant atherosclerotic plaque or calcification is not identified in the carotid arteries by real-time ultrasound or NASCET criteria     Assessment and Plan:     Active Diagnoses:    Diagnosis Date Noted POA    PRINCIPAL PROBLEM:  Chest pain, unspecified [R07.9] 01/14/2017 Yes    MI (myocardial infarction) [I21.3] 01/14/2017 Yes    Right sided weakness (rule out new CVA) [M62.81] 01/14/2017 Yes    Migraine [G43.909] 11/13/2012 Yes    Cerebrovascular accident (CVA) due to embolism of left middle cerebral artery [I63.412] 11/13/2012 Yes    Abnormal EKG [R94.31] 11/13/2012 Yes    KIRA (generalized anxiety disorder) [F41.1] 11/13/2012 Yes     Renée Goff is a 51 y.o. female with PMH of GHN, CVA x2, atrial septal defect s/p closure in 1996, migraines, who presents with chest pain. Echo as above. Troponin negative x 3. Low 10-Year ASCVD risk.  Has remained CP free since last night.      - nuclear stress test done today was free of evidence for myocardial ischemia or injury with abnormal wall motion at rest showing moderate hypokinesis of the septal wall of the left ventricle correlating with patient's atrial septal defect that was closed in 1996  - continue ASA and all other medications as tolerated.   - Monitor VS closely   - Monitor on telemetry     Total patient time was 33 minutes and greater than 50% was spent in counseling and coordination of care.  Labs and procedures reviewed.   Problem List reviewed.     Shirley Jennings NP  Cardiology  [facility]

## 2017-01-17 NOTE — PLAN OF CARE
01/17/17 0753   Final Note   Assessment Type Discharge Planning Assessment   Discharge Disposition Home   Discharge planning education complete? Yes

## 2017-01-17 NOTE — PT/OT/SLP DISCHARGE
Physical Therapy Discharge Summary    Renée Goff  MRN: [id number]   Chest pain, unspecified   Patient Discharged from acute Physical Therapy on 2017.  Please refer to prior PT noted date on 2017 for functional status.     Assessment:   Patient appropriate for care in another setting.  GOALS:   Physical Therapy Goals     Not on file      Multidisciplinary Problems (Resolved)        Problem: Physical Therapy Goal    Goal Priority Disciplines Outcome Goal Variances Interventions   Physical Therapy Goal   (Resolved)     PT/OT, PT Outcome(s) achieved     Description:  Goals to be met by: 2017     Patient will increase functional independence with mobility by performin. Sit to stand transfer with [place]  2. Bed to chair transfer with [place]   3. Gait  x >200 feet with [place].              Reasons for Discontinuation of Therapy Services  Transfer to alternate level of care.      Plan:  Patient Discharged to: home.

## 2017-01-18 ENCOUNTER — TELEPHONE (OUTPATIENT)
Dept: NEUROLOGY | Facility: CLINIC | Age: 52
End: 2017-01-18

## 2017-01-18 NOTE — TELEPHONE ENCOUNTER
----- Message from Montserrat Woodall sent at 1/18/2017 10:51 AM CST -----  Contact: Hilda - daughter  Patient was seen at [facility] this weekend for possible stroke symptoms, was dx with complicated migraines and advised to see Dr. Zamora, contact patient's daughter at  [phone] to schedule.    Thank you

## 2017-01-19 NOTE — TELEPHONE ENCOUNTER
Patient daughter declined appointment on 2/8/2017 with Dr. Mackey.  Given number for [facility] for sooner appointment.

## 2017-01-24 ENCOUNTER — TELEPHONE (OUTPATIENT)
Dept: NEUROLOGY | Facility: CLINIC | Age: 52
End: 2017-01-24

## 2017-01-24 NOTE — TELEPHONE ENCOUNTER
Tried to call patient. No answer. Left message asking her to call back in regards to scheduling an appointment with Dr. Mackey.     Message sent through [facility] as well.

## 2017-01-25 NOTE — TELEPHONE ENCOUNTER
Tried to call patient to schedule appointment. Mailbox is full. Will try to call her again later.

## 2017-01-26 ENCOUNTER — TELEPHONE (OUTPATIENT)
Dept: NEUROLOGY | Facility: CLINIC | Age: 52
End: 2017-01-26

## 2017-01-26 NOTE — TELEPHONE ENCOUNTER
----- Message from Marci Pena sent at 1/26/2017 10:28 AM CST -----  Contact: Hilda-daughter  Returning your call.  Please call back at

## 2017-01-27 ENCOUNTER — OFFICE VISIT (OUTPATIENT)
Dept: NEUROLOGY | Facility: CLINIC | Age: 52
End: 2017-01-27
Payer: COMMERCIAL

## 2017-01-27 VITALS
HEIGHT: 64 IN | HEART RATE: 65 BPM | BODY MASS INDEX: 28.51 KG/M2 | WEIGHT: 167 LBS | DIASTOLIC BLOOD PRESSURE: 73 MMHG | TEMPERATURE: 97 F | SYSTOLIC BLOOD PRESSURE: 116 MMHG | RESPIRATION RATE: 20 BRPM

## 2017-01-27 DIAGNOSIS — F41.9 ANXIETY: Primary | ICD-10-CM

## 2017-01-27 DIAGNOSIS — R41.3 MEMORY LOSS OF UNKNOWN CAUSE: ICD-10-CM

## 2017-01-27 PROCEDURE — 99999 PR PBB SHADOW E&M-EST. PATIENT-LVL III: CPT | Mod: PBBFAC,,, | Performed by: PSYCHIATRY & NEUROLOGY

## 2017-01-27 PROCEDURE — 1159F MED LIST DOCD IN RCRD: CPT | Mod: S$GLB,,, | Performed by: PSYCHIATRY & NEUROLOGY

## 2017-01-27 PROCEDURE — 99215 OFFICE O/P EST HI 40 MIN: CPT | Mod: S$GLB,,, | Performed by: PSYCHIATRY & NEUROLOGY

## 2017-01-27 NOTE — PROGRESS NOTES
Headache questionnaire    1. When did your Headaches start?    20 years ago      2. Where are your headaches located?   Forehead and eyes      3. Your headache's characteristics:   Excruciating, Pressure, Stabbing, Sharp      4. How long does the headache last?   days      5. How often does the headache occur?   weekly      6. Are your headaches preceded or accompanied by other symptoms? yes   If yes, please describe.  Memory loss and nausea      7. Does the headache awaken you at night? no   If so, how often?         8. Please denise the word that best describes your headache's intensity:    severe      9. Using a scale of 1 through 10, with 0 = no pain and 10 = the worst pain:   What score is your headache now? 0   What score is your headache at its worst? 10   What score is your headache at its best? 3        10. Possible associated headache symptoms:  [x]  Sensitivity to light  [x] Dizziness  [] Nasal or sinus pressure/ pain   [x] Sensitivity to noise  [x] Vertigo  [x] Problems with concentration  [] Sensitivity to smells  [] Ringing in ears  [x] Problems with memory    [x] Blurred vision  [x] Irritability  [x] Problems with task completion   [x] Double vision  [] Anger  [x]  Problems with relaxation  [x] Loss of appetite  [x] Anxiety  [] Neck tightness, Neck pain  [x] Nausea   [] Nasal congestion  [x] Vomiting         11. Headache improving factors:  [x] Sleep  [x] Heat  [x] Darkness  [x] Ice  [] Local pressure [] Menses (period)  [] Massage   [x] Medications:        12. Headache worsening factors:   [] Fatigue [] Sneezing  [] Changes in Weather  [x] Light [x] Bending Over [x] Stress  [x] Noise [] Ovulation  [] Multiple Sclerosis Flare-Up  [] Smells  [] Menses  [] Food   [] Coughing [] Alcohol      13. Number of caffeinated drinks per day: 3      14. Number of diet drinks per day:  0      15. Have you seen any other [facility] within the last 3 years?  no

## 2017-01-27 NOTE — LETTER
January 27, 2017      Dougie Zamora Jr., MD  [address] [facility] [address] [place] [address]           [facility]  [address] [facility] [address] [place] [address]  Phone: [phone]  Fax: [phone]          Patient: Renée Goff   MR Number: [id number]   YOB: 1965   Date of Visit: 1/27/2017       Dear Dr. Dougie Zamora Jr.:    Thank you for referring Renée Goff to me for evaluation. Attached you will find relevant portions of my assessment and plan of care.    If you have questions, please do not hesitate to call me. I look forward to following Renée Goff along with you.    Sincerely,    Emma Mackey MD    Enclosure  CC:  No Recipients    If you would like to receive this communication electronically, please contact [email or web address] or [phone] to request more information on [employer] Link access.    For providers and/or their staff who would like to refer a patient to [facility], please contact us through our one-stop-shop provider referral line, [facility], at [phone].    If you feel you have received this communication in error or would no longer like to receive these types of communications, please e-mail [email or web address]

## 2017-01-27 NOTE — PROGRESS NOTES
Subjective:       Patient ID: Renée Goff is a 51 y.o. female.    Chief Complaint: Headache    HPI   The patient is a 51 y.o. female who was admitted to the hospital on 1/14/17 for possible stroke. She reported right sided weakness and numbness with no speech difficulty associated with headache and memory loss. There were no no clear precipitating factors. She underwent a head CT, a brain MRI, an US of carotids and a thorough cardia evaluation all of which was normal. The  states that all of a sudden a severe headache that had needed IV morphine resolved. It was a sudden and unexpected resolution. She is back to her baseline. Her  states that in the past she was diagnosed with ASD and that it was closed in 1996. She has history of strokes, however, imaging studies fail to reveal evidence of past infarcts.Her  states that she has lost memory for 2 years, to the extent that did not know that she had grandchildren or that her mother passed away.  Please see details of headache characteristics headache below.  1. When did your Headaches start?   20 years ago        2. Where are your headaches located?  Forehead and eyes        3. Your headache's characteristics:  Excruciating, Pressure, Stabbing, Sharp        4. How long does the headache last?  days        5. How often does the headache occur?  weekly        6. Are your headaches preceded or accompanied by other symptoms? yes  If yes, please describe. Memory loss and nausea        7. Does the headache awaken you at night? no  If so, how often?            8. Please denise the word that best describes your headache's intensity:   severe        9. Using a scale of 1 through 10, with 0 = no pain and 10 = the worst pain:  What score is your headache now? 0  What score is your headache at its worst? 10  What score is your headache at its best? 3      10. Possible associated headache symptoms:  [x]  Sensitivity to light  [x] Dizziness  [] Nasal or  sinus pressure/ pain   [x] Sensitivity to noise  [x] Vertigo  [x] Problems with concentration  [] Sensitivity to smells  [] Ringing in ears  [x] Problems with memory    [x] Blurred vision  [x] Irritability  [x] Problems with task completion   [x] Double vision  [] Anger  [x]  Problems with relaxation  [x] Loss of appetite  [x] Anxiety  [] Neck tightness, Neck pain  [x] Nausea   [] Nasal congestion  [x] Vomiting           11. Headache improving factors:  [x] Sleep  [x] Heat  [x] Darkness  [x] Ice  [] Local pressure [] Menses (period)  [] Massage   [x] Medications:         12. Headache worsening factors:   [] Fatigue [] Sneezing  [] Changes in Weather  [x] Light [x] Bending Over [x] Stress  [x] Noise [] Ovulation  [] Multiple Sclerosis Flare-Up  [] Smells  [] Menses  [] Food   [] Coughing [] Alcohol        13. Number of caffeinated drinks per day: 3        14. Number of diet drinks per day: 0        Review of Systems   Constitutional: Positive for activity change and fatigue. Negative for appetite change and fever.   HENT: Negative for congestion, dental problem, hearing loss, sinus pressure, tinnitus, trouble swallowing and voice change.    Eyes: Positive for photophobia and pain. Negative for redness and visual disturbance.   Respiratory: Positive for chest tightness and shortness of breath. Negative for cough.    Cardiovascular: Positive for chest pain. Negative for palpitations and leg swelling.   Gastrointestinal: Positive for abdominal pain, nausea and vomiting. Negative for blood in stool.   Endocrine: Negative for cold intolerance and heat intolerance.   Genitourinary: Negative for difficulty urinating, frequency, menstrual problem and urgency.   Musculoskeletal: Positive for back pain. Negative for arthralgias, gait problem, joint swelling, myalgias, neck pain and neck stiffness.   Skin: Positive for color change.   Neurological: Positive for dizziness, light-headedness and headaches. Negative for tremors,  seizures, syncope, facial asymmetry, speech difficulty, weakness and numbness.   Hematological: Negative for adenopathy. Does not bruise/bleed easily.   Psychiatric/Behavioral: Positive for confusion and decreased concentration. Negative for agitation, behavioral problems, self-injury, sleep disturbance and suicidal ideas. The patient is nervous/anxious. The patient is not hyperactive.          Past Medical History   Diagnosis Date    MI (myocardial infarction)     Stroke      Past Surgical History   Procedure Laterality Date    Coronary artery bypass graft       Family History   Problem Relation Age of Onset    Heart attack Maternal Grandfather      Social History     Social History    Marital status:      Spouse name: N/A    Number of children: N/A    Years of education: N/A     Occupational History    Not on file.     Social History Main Topics    Smoking status: Never Smoker    Smokeless tobacco: Never Used    Alcohol use No    Drug use: No    Sexual activity: Yes     Partners: Male     Other Topics Concern    Not on file     Social History Narrative     Review of patient's allergies indicates:  No Known Allergies    Current Outpatient Prescriptions:     alprazolam (XANAX) 0.5 MG tablet, Take 0.5 mg by mouth nightly as needed.  , Disp: , Rfl:     amitriptyline (ELAVIL) 10 MG tablet, Take 40 mg by mouth every evening. , Disp: , Rfl:     aspirin 325 MG tablet, Take 1 tablet (325 mg total) by mouth once daily., Disp: , Rfl: 0    atorvastatin (LIPITOR) 20 MG tablet, Take 1 tablet (20 mg total) by mouth once daily., Disp: 30 tablet, Rfl: 0    butalbital-acetaminophen-caffeine -40 mg (FIORICET, ESGIC) -40 mg per tablet, Take 1 tablet by mouth every 4 (four) hours as needed for Headaches., Disp: 15 tablet, Rfl: 0    meloxicam (MOBIC) 7.5 MG tablet, Take 7.5 mg by mouth 2 (two) times daily., Disp: , Rfl:     multivitamin (MULTIVITAMIN) per tablet, Take 1 tablet by mouth once  daily.  , Disp: , Rfl:     propranolol (INDERAL) 40 MG tablet, Take 1 tablet (40 mg total) by mouth 2 (two) times daily., Disp: 180 tablet, Rfl: 3      Objective:      Vitals:    01/27/17 1432   BP: 116/73   Pulse: 65   Resp: 20   Temp: 97 °F (36.1 °C)     Body mass index is 28.67 kg/(m^2).    Physical Exam    Constitutional:   She appears well-developed and well-nourished. She is well groomed  HENT:    Head: Atraumatic, oral and nasal mucosa intact  Eyes: Conjunctivae and EOM are normal. Pupils are equal, round, and reactive to light OU  Neck: Neck supple. No thyromegaly present  Cardiovascular: Normal rate and normal heart sounds  No murmur heard  Pulmonary/Chest: Effort normal and breath sounds normal  Skin: Skin is warm and dry  Psychiatric: Anxious affect     Neuro exam:    Mental status: MOCA score 22/30  Awake, attentive, Alert, oriented to self, place, year and month  Language function is intact    Cranial Nerves:  Smell was not formally evaluated  Cranial Nerves II - XII: intact  Pursuits were smooth, normal saccades, no nystagmus OU  Funduscopic exam - disc were flat and pink, no exudates or hemorrhages OU  Motor - facial movement was symmetrical and normal     Palate moved well and was symmetrical with normal palatal and oral sensation  Tongue movements were full    Coordination:     Rapid alternating movements and rapid finger tapping - normal bilaterally  Finger to nose - normal and symmetric bilaterally       Motor:  Normal muscle bulk and symmetry. No fasciculations were noted    No pronator drift  Strength 5/5 bilaterally     Reflexes:  Tendon reflexes were 2 + at biceps, triceps, brachioradialis, patellar, and Achilles bilaterally  No clonus was noted     Sensory: Intact to light touch, pin prick in all extremities. Vibration and proprioception intact in all extremities.     Gait: Slow pace, hesitant    Review of Data: Normal ct of the head, normal brain MRI, normal US of carotids        Assessment  and Plan   The patient is a 51 y.o. female who was admitted to the hospital on 1/14/17for evaluation for possible stroke. Stroke was ruled out by exam, normal head CT, US of carotids, and MRI of the brain. I agree that the symptoms of right sided weakness and numbness could be related to complex migraine, however there a number of features that do fit. For instance, the patient and her  state that she has profound memory loss since she had a previous stroke and worsened since last admission of 1/14/17. As an example, she cannot recall the fact that she has grandchildren or that her mother passed away. Her MOCA score was low at 22/30 but during the interview today she was conversational with no deficit. Furthermore, there is no imaging evidence of previous stroke which allegedly affected the right hemibody but was not associated with speech impairment at all. Considering underlying history of severe anxiety, the possibility of a conversion disorder is high in the differential.    I am sending her for a Psychiatry evaluation and a Neuropsychological evaluation.  Continue with current migraine prevention regime.    Anxiety/Conversion Disorder?  -     Ambulatory Referral to Psychiatry    Memory loss of unknown cause  -     Ambulatory Referral to Neuropsychology    Counseling:  More than 50% of the 50 minute encounter was spent face to face counseling the patient regarding current status and future plan of care as well as side of the medications. All questions were answered to patient's satisfaction    Carlos Mackey M.D  Medical Director, Headache and Facial Pain  [facility]

## 2017-02-05 DIAGNOSIS — R07.9 CHEST PAIN AT REST: ICD-10-CM

## 2017-02-05 RX ORDER — PROPRANOLOL HYDROCHLORIDE 40 MG/1
TABLET ORAL
Qty: 180 TABLET | Refills: 2 | Status: SHIPPED | OUTPATIENT
Start: 2017-02-05 | End: 2017-04-22

## 2017-02-13 NOTE — TELEPHONE ENCOUNTER
Spoke with patient. Informed her that Dr. Mackey is out of the office on Mondays but I will get her refill sent to the pharmacy tomorrow for her. Pt verbalized an understanding.

## 2017-02-13 NOTE — TELEPHONE ENCOUNTER
----- Message from Bina Reid sent at 2/13/2017 10:18 AM CST -----  Contact: self  Patient wants to speak with a nurse regarding getting a Rx for Lipitor .    Please send to    [facility] [id number] - [place] - [id number]  [address]  [place] [address]  Phone: [phone] Fax: [phone]

## 2017-02-14 RX ORDER — ATORVASTATIN CALCIUM 20 MG/1
20 TABLET, FILM COATED ORAL DAILY
Qty: 30 TABLET | Refills: 3 | Status: SHIPPED | OUTPATIENT
Start: 2017-02-14 | End: 2017-03-07 | Stop reason: SDUPTHER

## 2017-03-07 RX ORDER — ATORVASTATIN CALCIUM 20 MG/1
20 TABLET, FILM COATED ORAL DAILY
Qty: 90 TABLET | Refills: 3 | Status: SHIPPED | OUTPATIENT
Start: 2017-03-07 | End: 2019-01-30 | Stop reason: SDUPTHER

## 2017-03-07 NOTE — TELEPHONE ENCOUNTER
----- Message from Tonya Pablo sent at 3/7/2017 12:22 PM CST -----  Contact: Renée Lawson is requesting refill Rx Lipitor 90-day supply to be sent to     [facility] - [place], [place] - [address]  [address]  [address]  [facility] [address]  Phone: [phone] Fax: [phone]    Please call when sent to pharmacy [phone]. Thanks!

## 2017-03-07 NOTE — TELEPHONE ENCOUNTER
Notified patient that the rx has been sent to the pharmacy as requested. Pt verbalized an understanding.

## 2017-03-29 ENCOUNTER — TELEPHONE (OUTPATIENT)
Dept: NEUROLOGY | Facility: CLINIC | Age: 52
End: 2017-03-29

## 2017-03-29 NOTE — TELEPHONE ENCOUNTER
----- Message from Jeff Gill sent at 3/29/2017 10:13 AM CDT -----  Contact: Self- [phone]  Patient called asking for advice, had migraine since Monday. Feeling better today, asking if for advice.  Thanks!

## 2017-03-29 NOTE — TELEPHONE ENCOUNTER
Spoke with patient. She has been having a bad migraine for the last three days. She said that when she gets up to walk she feels like she is walking sideways.  Patient is currently taking the Elavil 10mg QHS, inderal 40mg QD, and Fioricet PRN. Patient states that she has been N/V as well. Patient does not have anything for the nausea. Rates pain as a 5/10 right now. Please advise.

## 2017-04-22 ENCOUNTER — HOSPITAL ENCOUNTER (EMERGENCY)
Facility: HOSPITAL | Age: 52
Discharge: HOME | End: 2017-04-22
Attending: EMERGENCY MEDICINE
Payer: COMMERCIAL

## 2017-04-22 VITALS
RESPIRATION RATE: 14 BRPM | WEIGHT: 167 LBS | TEMPERATURE: 98 F | HEIGHT: 64 IN | DIASTOLIC BLOOD PRESSURE: 54 MMHG | SYSTOLIC BLOOD PRESSURE: 109 MMHG | BODY MASS INDEX: 28.51 KG/M2 | OXYGEN SATURATION: 100 % | HEART RATE: 88 BPM

## 2017-04-22 DIAGNOSIS — S39.012A LUMBAR STRAIN, INITIAL ENCOUNTER: Primary | ICD-10-CM

## 2017-04-22 DIAGNOSIS — M62.830 LUMBAR PARASPINAL MUSCLE SPASM: ICD-10-CM

## 2017-04-22 PROCEDURE — 25000003 PHARM REV CODE 250

## 2017-04-22 PROCEDURE — 96372 THER/PROPH/DIAG INJ SC/IM: CPT

## 2017-04-22 PROCEDURE — 63600175 PHARM REV CODE 636 W HCPCS: Performed by: PHYSICIAN ASSISTANT

## 2017-04-22 PROCEDURE — 99283 EMERGENCY DEPT VISIT LOW MDM: CPT | Mod: 25

## 2017-04-22 RX ORDER — LIDOCAINE 50 MG/G
1 PATCH TOPICAL DAILY
Qty: 30 PATCH | Refills: 0 | Status: SHIPPED | OUTPATIENT
Start: 2017-04-22 | End: 2017-07-13

## 2017-04-22 RX ORDER — LIDOCAINE 50 MG/G
PATCH TOPICAL
Status: DISCONTINUED
Start: 2017-04-22 | End: 2017-04-22 | Stop reason: HOSPADM

## 2017-04-22 RX ORDER — KETOROLAC TROMETHAMINE 30 MG/ML
30 INJECTION, SOLUTION INTRAMUSCULAR; INTRAVENOUS
Status: COMPLETED | OUTPATIENT
Start: 2017-04-22 | End: 2017-04-22

## 2017-04-22 RX ORDER — LIDOCAINE 50 MG/G
1 PATCH TOPICAL
Status: DISCONTINUED | OUTPATIENT
Start: 2017-04-22 | End: 2017-04-22 | Stop reason: HOSPADM

## 2017-04-22 RX ORDER — CYCLOBENZAPRINE HCL 10 MG
10 TABLET ORAL 3 TIMES DAILY PRN
Qty: 12 TABLET | Refills: 0 | Status: SHIPPED | OUTPATIENT
Start: 2017-04-22 | End: 2021-09-20 | Stop reason: SDUPTHER

## 2017-04-22 RX ORDER — DICLOFENAC SODIUM 75 MG/1
75 TABLET, DELAYED RELEASE ORAL 2 TIMES DAILY PRN
Qty: 30 TABLET | Refills: 0 | Status: SHIPPED | OUTPATIENT
Start: 2017-04-22 | End: 2019-05-07

## 2017-04-22 RX ORDER — ORPHENADRINE CITRATE 30 MG/ML
30 INJECTION INTRAMUSCULAR; INTRAVENOUS
Status: COMPLETED | OUTPATIENT
Start: 2017-04-22 | End: 2017-04-22

## 2017-04-22 RX ADMIN — LIDOCAINE 1 PATCH: 50 PATCH TOPICAL at 01:04

## 2017-04-22 RX ADMIN — ORPHENADRINE CITRATE 30 MG: 30 INJECTION INTRAMUSCULAR; INTRAVENOUS at 01:04

## 2017-04-22 RX ADMIN — KETOROLAC TROMETHAMINE 30 MG: 30 INJECTION, SOLUTION INTRAMUSCULAR at 01:04

## 2017-04-22 NOTE — ED AVS SNAPSHOT
[facility] [address] [place] [address]               Renée Goff   2017  1:06 PM   ED    Description:  Female : 1965   Department:  [facility]           Your Care was Coordinated By:     Provider Role From To    Yohan Senior MD Attending Provider 17 1312 --    Ruthie Tracey PA-C Physician Assistant 17 1311 --      Reason for Visit     Back Pain           Diagnoses this Visit        Comments    Lumbar strain, initial encounter    -  Primary     Lumbar paraspinal muscle spasm           ED Disposition     None           To Do List           Follow-up Information     Follow up with Thomas Khan MD.    Specialty:  Family Medicine    Why:  for re-evaluation in 2-3 days     Contact information:    [address]  [facility] [address]  [facility] [id number] [phone]          Follow up with [facility].    Specialty:  Emergency Medicine    Why:  As needed, If symptoms worsen    Contact information:    [address] [place] [id number] [phone]       These Medications        Disp Refills Start End    diclofenac (VOLTAREN) 75 MG EC tablet 30 tablet 0 2017     Take 1 tablet (75 mg total) by mouth 2 (two) times daily as needed. - Oral    cyclobenzaprine (FLEXERIL) 10 MG tablet 12 tablet 0 2017    Take 1 tablet (10 mg total) by mouth 3 (three) times daily as needed for Muscle spasms. - Oral    lidocaine (LIDODERM) 5 % 30 patch 0 2017     Place 1 patch onto the skin once daily. Remove & Discard patch within 12 hours or as directed by MD - Transdermal      [facility]     [facility] Nurse Care Line - 24/ Assistance  Unless otherwise directed by your provider, please contact [facility], our nurse care line that is available for 24/ assistance.     Registered nurses in the [facility] provide: appointment scheduling,  clinical advisement, health education, and other advisory services.  Call: [phone] (toll free)               Medications           Message regarding Medications     Verify the changes and/or additions to your medication regime listed below are the same as discussed with your clinician today.  If any of these changes or additions are incorrect, please notify your healthcare provider.        START taking these NEW medications        Refills    diclofenac (VOLTAREN) 75 MG EC tablet 0    Sig: Take 1 tablet (75 mg total) by mouth 2 (two) times daily as needed.    Class: Print    Route: Oral    cyclobenzaprine (FLEXERIL) 10 MG tablet 0    Sig: Take 1 tablet (10 mg total) by mouth 3 (three) times daily as needed for Muscle spasms.    Class: Print    Route: Oral    lidocaine (LIDODERM) 5 % 0    Sig: Place 1 patch onto the skin once daily. Remove & Discard patch within 12 hours or as directed by MD    Class: Print    Route: Transdermal      These medications were administered today        Dose Freq    ketorolac injection 30 mg 30 mg ED 1 Time    Sig: Inject 30 mg into the muscle ED 1 Time.    Class: Normal    Route: Intramuscular    orphenadrine injection 30 mg 30 mg ED 1 Time    Sig: Inject 1 mL (30 mg total) into the muscle ED 1 Time.    Class: Normal    Route: Intramuscular    lidocaine 5 % patch 1 patch 1 patch Every 24 hours (non-standard times)    Sig: Place 1 patch onto the skin every 24 hours.    Class: Normal    Route: Transdermal    lidocaine (LIDODERM) 5 % patch      Notes to Pharmacy: Created by cabinet override           Verify that the below list of medications is an accurate representation of the medications you are currently taking.  If none reported, the list may be blank. If incorrect, please contact your healthcare provider. Carry this list with you in case of emergency.           Current Medications     alprazolam (XANAX) 0.5 MG tablet Take 0.5 mg by mouth nightly as needed.      amitriptyline  "(ELAVIL) 10 MG tablet Take 40 mg by mouth every evening.     aspirin 325 MG tablet Take 1 tablet (325 mg total) by mouth once daily.    atorvastatin (LIPITOR) 20 MG tablet Take 1 tablet (20 mg total) by mouth once daily.    meloxicam (MOBIC) 7.5 MG tablet Take 7.5 mg by mouth 2 (two) times daily.    multivitamin (MULTIVITAMIN) per tablet Take 1 tablet by mouth once daily.      propranolol (INDERAL) 40 MG tablet Take 1 tablet (40 mg total) by mouth 2 (two) times daily.    cyclobenzaprine (FLEXERIL) 10 MG tablet Take 1 tablet (10 mg total) by mouth 3 (three) times daily as needed for Muscle spasms.    diclofenac (VOLTAREN) 75 MG EC tablet Take 1 tablet (75 mg total) by mouth 2 (two) times daily as needed.    lidocaine (LIDODERM) 5 % Place 1 patch onto the skin once daily. Remove & Discard patch within 12 hours or as directed by MD    lidocaine 5 % patch 1 patch Place 1 patch onto the skin every 24 hours.           Clinical Reference Information           Your Vitals Were     BP Pulse Temp Resp Height Weight    109/54 (BP Location: Right arm, Patient Position: Sitting) 88 98 °F (36.7 °C) (Oral) 14 5' 4" (1.626 m) 75.8 kg (167 lb)    Last Period SpO2 BMI          10/12/2012 100% 28.67 kg/m2        Allergies as of 4/22/2017     No Known Allergies      Immunizations Administered on Date of Encounter - 4/22/2017     None      ED Micro, Lab, POCT     None      ED Imaging Orders     None      Discharge References/Attachments     BACK SPASM, NO TRAUMA (ENGLISH)    BACK SPRAIN/STRAIN (ENGLISH)      Your Scheduled Appointments     Nov 09, 2017  1:30 PM CST   Established Patient Visit with MD Santos Jasso - Cardiology ([facility] [place] [facility])    [address]  [place] [id number] [phone]               [facility] complies with applicable Federal civil rights laws and does not discriminate on the basis of race, color, national origin, age, disability, or sex.        Language " Assistance Services     ATTENTION: Language assistance services are available, free of charge. Please call [phone].      ATENCIÓN: Si habla español, tiene a gutierrez disposición servicios gratuitos de asistencia lingüística. Llame al [phone].     CHÚ Ý: N?u b?n nói Ti?ng Vi?t, có các d?ch v? h? tr? ngôn ng? mi?n phí dành cho b?n. G?i s? [phone].

## 2017-04-23 NOTE — ED PROVIDER NOTES
Encounter Date: 4/22/2017       History     Chief Complaint   Patient presents with    Back Pain     Low back pain since Tuesday, worsening.     Review of patient's allergies indicates:  No Known Allergies  HPI Comments: Renée Goff is a 51 y.o. Female presenting for evaluation of lower back pain, persisting since Tuesday.  She states the pain began after getting up from the couch.  No direct injury, trauma or fall.  She does not think that she had any awkward twisting upon getting up from the couch.  No fever, no chills.  No loss of bowel or bladder control.  She has not taken any medication, except Naprosyn for the pain.  She is noticed little improvement.  She is able to walk, but with increased pain.  No numbness, tingling or weakness.  She has a history of lower back pain, but not recently.     The history is provided by the patient.     Past Medical History:   Diagnosis Date    MI (myocardial infarction)     Stroke      Past Surgical History:   Procedure Laterality Date    CORONARY ARTERY BYPASS GRAFT       Family History   Problem Relation Age of Onset    Heart attack Maternal Grandfather      Social History   Substance Use Topics    Smoking status: Never Smoker    Smokeless tobacco: Never Used    Alcohol use No     Review of Systems   Constitutional: Negative for chills and fever.   Respiratory: Negative for cough, chest tightness, shortness of breath and wheezing.    Cardiovascular: Negative for chest pain and palpitations.   Musculoskeletal: Positive for arthralgias, back pain and myalgias. Negative for joint swelling, neck pain and neck stiffness.   Skin: Negative for color change, pallor, rash and wound.   Neurological: Negative for weakness and numbness.   Hematological: Does not bruise/bleed easily.       Physical Exam   Initial Vitals   BP Pulse Resp Temp SpO2   04/22/17 1231 04/22/17 1231 04/22/17 1231 04/22/17 1231 04/22/17 1231   109/54 88 14 98 °F (36.7 °C) 100 %     Physical  Exam    Nursing note and vitals reviewed.  Constitutional: She appears well-developed and well-nourished. She is not diaphoretic. No distress.   HENT:   Head: Normocephalic and atraumatic.   Neck: Normal range of motion. Neck supple.   Cardiovascular: Normal rate, regular rhythm, normal heart sounds and intact distal pulses.   Pulmonary/Chest: Breath sounds normal. No respiratory distress. She has no wheezes. She has no rhonchi. She has no rales.   Musculoskeletal: Normal range of motion. She exhibits tenderness. She exhibits no edema.        Lumbar back: She exhibits tenderness, pain and spasm. She exhibits normal range of motion, no bony tenderness, no swelling, no edema, no deformity, no laceration and normal pulse.        Back:    TTP noted to bilateral lumbar paraspinal muscles, extending across the top of each buttocks.  No midline lumbar spinous process tenderness noted.  No decreased ROM, decreased strength or loss of sensation to bilateral lower extremities.  Palpable 2+ pedal pulses.    Neurological: She is alert and oriented to person, place, and time. She has normal strength. No sensory deficit.   Skin: Skin is warm and dry. No rash and no abscess noted. No erythema.         ED Course   Procedures  Labs Reviewed - No data to display          Medical Decision Making:   Differential Diagnosis:   Lumbar strain  Lumbar spasm  Cauda equina  Abscess       APC / Resident Notes:   Her symptoms are most consistent with a lumbar strain and associated spasm.  She is given a dose of IM Toradol and Norflex here in the emergency department.  Lidoderm patches applied.  Low clinical suspicion for acute fracture, cauda equina or abscess we do not feel any further imaging or testing is necessary at this time.  We feel comfortable discharging her home to follow-up with her primary care provider for reevaluation in the next couple of days as needed.  She voices understanding and is agreeable to the plan.  She is given a  prescription for Flexeril, Voltaren and Lidoderm.  She is given specific return precautions.              ED Course     Clinical Impression:   The primary encounter diagnosis was Lumbar strain, initial encounter. A diagnosis of Lumbar paraspinal muscle spasm was also pertinent to this visit.          Ruthie Tracey PA-C  04/22/17 4936

## 2017-05-24 ENCOUNTER — OFFICE VISIT (OUTPATIENT)
Dept: PSYCHIATRY | Facility: CLINIC | Age: 52
End: 2017-05-24
Payer: COMMERCIAL

## 2017-05-24 DIAGNOSIS — F41.1 GAD (GENERALIZED ANXIETY DISORDER): ICD-10-CM

## 2017-05-24 DIAGNOSIS — R41.89 COGNITIVE IMPAIRMENT: Primary | ICD-10-CM

## 2017-05-24 DIAGNOSIS — R41.3 MEMORY LOSS: ICD-10-CM

## 2017-05-24 PROCEDURE — 96119 PR NEUROPSYCH TESTING BY TECHNICIAN: CPT | Mod: 59,S$GLB,, | Performed by: PSYCHOLOGIST

## 2017-05-24 PROCEDURE — 96118 PR NEUROPSYCH TESTING BY PSYCH/PHYS: CPT | Mod: 59,S$GLB,, | Performed by: PSYCHOLOGIST

## 2017-05-24 PROCEDURE — 90791 PSYCH DIAGNOSTIC EVALUATION: CPT | Mod: S$GLB,,, | Performed by: PSYCHOLOGIST

## 2017-05-31 ENCOUNTER — TELEPHONE (OUTPATIENT)
Dept: PSYCHIATRY | Facility: CLINIC | Age: 52
End: 2017-05-31

## 2017-05-31 NOTE — PSYCH TESTING
[facility] [address] [place]  [address]  [phone]    REPORT OF NEUROPSYCHOLOGICAL EVALUATION    NAME: Renée Goff  OC #: [id number]  : 1965    REFERRED BY: Emma Mackey M.D.    EVALUATED BY:  Kimi Schwarz, Ph.D., Clinical Psychologist  Anil King M.S., Psychometrician    DATE OF EVALUATION: 2017    EVALUATION PROCEDURES AND TIME:  Conducted by Psychologist:  Interpretation and report of test data  Review and integration of diagnostic interview, medical record, and test data  Conducted by Technician:  Repeatable Battery for the Assessment of Neuropsychological Status (RBANS)  Temporal Orientation Test  Naming Test from the Neuropsychological Assessment Battery (NAB)  Controlled Oral Word Association Test  Facial Recognition Test  Cohen Visual Motor Gestalt Test  Wechsler Memory Scale IV Logical Memory & Visual Reproduction Battery (WMS-IV LMVR)  Wisconsin Card Sorting Test - 64 (WCST-64)  Trail Making Test, Parts A & B  Lang Depression Inventory - II (BDI-II)  Lang Anxiety Inventory (YAZAN)  Time: CPT Code 93058 - 2 hours; CPT Code 44895 - 2 hours    EVALUATION FINDINGS:  The diagnostic interview revealed Mrs. Renée Goff is a 51 year old right-handed white female referred for Neuropsychological Evaluation on an outpatient basis due to subjective amnesia for events for 2 years prior to 2017.  She was hospitalized at that time for a possible stroke (which was felt not to be the case) and forgot what had happened the prior two years. Some of the events that occurred during that 2 year period include death of her mother, births of 2 grandchildren, baby showers she gave, and weddings she attended.  She stated that she lost a piece of her life after she was hospitalized. She reported that since 2017 she is able to recall recent events but her  reported there is now a delay in her responding to things as though she has to think  about things. He also reported she has to ask him what the day and the date are.  They also reported that she currently misplaces personal items in the home; forgets conversations and events; repeats herself; has to use a calendar to remind herself of things; forgets what she reads; loses her train of thought in conversation; and reports word-finding difficulty (not observed). She drives, cooks, and manages her medications and household without difficulty. Her  has always managed the family finances. They reported it seems to be staying the same over time. Family history is significant for grandmother and two uncles with Alzheimers disease and father with confusion. Mrs. Goff reportedly had 2 strokes in 1996 and was amnestic for the events of the day of the stroke but had no lasting sequelae. Mrs. Goff has a history of generalized anxiety disorder since her strokes in 1996. Her neurologist at [facility], Dr. Tristin Henderson, prescribed Xanax and Elavil until January 2017 when she transferred her neurological care to Dr. Mackey. She is still taking Xanax and Elavil. She reported she still worries some. She declined the offer of an appointment with a psychiatrist for medication management or a  for psychotherapy. She wants Dr. Mackey to manage her medications. She was pleasant and cooperative in interview but appeared anxious. The Mental Status Exam suggested reduced abstraction ability but was otherwise within normal limits.    The medical record also revealed prior medical history of memory loss, MI s/p CABG, CVA, ASD closed 12/1996, and migraines. CT of the head completed on 1/14/2017 was negative. MRI of the brain completed on 1/14/2017 was negative. MoCA completed in Neurology on 1/27/2017 yielded a score of 22/30, in the impaired range.       TEST DATA:  Neuropsychological tests administered by the technician revealed that the patient reported she is a high school graduate.  She worked as  a  at a [employer]. 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 Now she occasionally watches one grandchild. She was alert and cooperative during the evaluation.  Effort on all tests was satisfactory to produce valid results.    Mrs. Goff obtained a total score of 86 on the RBANS, which is at the 18th percentile, suggesting low average general cognitive functioning.  Five areas were tested.  The Immediate Memory Index revealed average ability to remember verbal information immediately after it is presented, with a score of 103 at the 58th percentile.  The Visuospatial/Constructional Index revealed moderate impairment in the ability to perceive spatial relations and to construct a spatially accurate copy of a drawing, with a score of 75 at the 5th percentile. Visuospatial perception was low average. Constructional ability was moderately impaired. The Language Index revealed average ability to respond verbally to either naming or retrieving learned material, with a score of 102 at the 55th percentile. Naming was low average. Verbal fluency was average. Attention, or the capacity to remember and manipulate both visually and orally presented information in short-term storage, was average, with a score of 94 at the 34th percentile. Delayed memory, or anterograde memory capacity, was mildly impaired, with a score of 78 at the 7th percentile. Subtest performances revealed average story recall, low average list recall, mildly impaired list recognition, and moderately impaired figure recall.  For reference, the expected average score on each index is 100, which is at the 50th percentile.    The Temporal Orientation Test indicated she was oriented to day of the week, day of the month, month, year, and time of day.  Her score on this measure suggested average temporal orientation.    Naming, as assessed by the NAB Test, was above average.  Verbal  fluency, as assessed by the Controlled Oral Word Association Test, was in the average range.    Performance on the Facial Recognition Test suggested no prosopagnosia was present, as her score was in the superior range.  Reproductions of Cohen Visual Motor Gestalt Test designs revealed mild constructional dyspraxia.    The WMS-IV LMVR was administered to further assess memory.  Her Immediate Memory Index of 85 and her Delayed Memory Index of 82 were in the mildly impaired range. Her Auditory Memory Index of 95 was in the average range and her Visual Memory Index of 76 was in the moderately impaired range. The expected average score for each index is 100. Scaled scores of the memory indexes revealed average immediate and delayed auditory memory, moderately impaired immediate visual memory, and mildly impaired delayed visual memory.    The WCST-64 was administered to assess abstract reasoning and conceptualization.  Her categories completed score (3) was in the average range. Her total errors score (18) and her perseverative errors score (11) were in the average range.  Her performance suggested average abstract reasoning skills.    Her score on the Trail Making Test, Part A, (38 seconds for completion) indicated that psychomotor speed was in the mildly impaired range.  Her score on Part B (77 seconds for completion) indicated that her ability to handle complex relationships which require rapid change of set, or mental flexibility, was in the average range.    The BDI-II was administered to assess for depression.  Her score of 4 suggested minimal depression was present.  The YAZAN was administered to assess for anxiety.  Her score of 6 suggested minimal anxiety was present.    SUMMARY AND RECOMMENDATIONS:  Mrs. Goff was referred for Neuropsychological Evaluation on an outpatient basis due to subjective amnesia for events for 2 years prior to January 2017.  Her general cognitive abilities as assessed by the RBANS were  in the low average range, with average immediate verbal memory, language and attention; moderately impaired visuospatial/constructional abilities, and mildly impaired delayed memory.  Further assessment of specific cognitive abilities revealed no deficits in temporal orientation, naming, verbal fluency, facial recognition, abstract reasoning, and mental flexibility. Constructional ability and psychomotor speed were mildly impaired. Additional memory assessment revealed average immediate and delayed auditory memory, moderately impaired immediate visual memory, and mildly impaired delayed visual memory.  Personality test data revealed no evidence of significant depression or anxiety.  Neuropsychological testing reveals impairment in immediate and delayed visual memory, constructional ability, and psychomotor speed; and variability in delayed auditory/verbal memory (average, low average, and mildly impaired performances). All other test results, including immediate auditory/verbal memory and mental flexibility, were within normal limits. Impairment that was present was chiefly in abilities arising from the right hemisphere. The pattern is not typical of a primary memory disorder but may be vascular in nature given her history. The period of amnesia for 2 years prior to January 2017 in the absence of a traumatic brain injury does not appear to be due to cognitive impairment. She declined the offer of an appointment with a psychiatrist or a  for psychotherapy but this might be suggested again as the amnestic period may be psychogenic in nature. She wants Dr. Mackey to manage her medications.        Interpretation and report and coding were completed on 5/31/2017.

## 2017-06-05 ENCOUNTER — TELEPHONE (OUTPATIENT)
Dept: NEUROLOGY | Facility: CLINIC | Age: 52
End: 2017-06-05

## 2017-06-05 NOTE — TELEPHONE ENCOUNTER
Spoke with patient. She was requesting the results from her neuropsych testing. Informed her that I can not see those results and Dr. Mackey is out of the office today but I would send this to her to review for tomorrow.

## 2017-06-05 NOTE — TELEPHONE ENCOUNTER
----- Message from Antonella Max sent at 6/5/2017  9:59 AM CDT -----  Contact: self  Patient called for that status of the test she did on 5/24/17 at the [facility]    Please call her at  to advise.     Thanks

## 2017-06-07 NOTE — TELEPHONE ENCOUNTER
----- Message from Reilly Tavares sent at 6/7/2017  9:53 AM CDT -----  Contact: pt  Pt is returning call, call placed to pod no answer  Call Back#[phone]  Thanks

## 2017-07-10 DIAGNOSIS — Z12.11 COLON CANCER SCREENING: ICD-10-CM

## 2017-07-13 ENCOUNTER — OFFICE VISIT (OUTPATIENT)
Dept: NEUROLOGY | Facility: CLINIC | Age: 52
End: 2017-07-13
Payer: COMMERCIAL

## 2017-07-13 VITALS
HEART RATE: 68 BPM | RESPIRATION RATE: 18 BRPM | WEIGHT: 178 LBS | TEMPERATURE: 98 F | SYSTOLIC BLOOD PRESSURE: 129 MMHG | DIASTOLIC BLOOD PRESSURE: 74 MMHG | BODY MASS INDEX: 30.39 KG/M2 | HEIGHT: 64 IN

## 2017-07-13 DIAGNOSIS — J30.89 PERENNIAL SINUSITIS: ICD-10-CM

## 2017-07-13 DIAGNOSIS — F41.1 GAD (GENERALIZED ANXIETY DISORDER): ICD-10-CM

## 2017-07-13 DIAGNOSIS — G43.009 MIGRAINE WITHOUT AURA, WITHOUT MENTION OF INTRACTABLE MIGRAINE WITHOUT MENTION OF STATUS MIGRAINOSUS: Primary | ICD-10-CM

## 2017-07-13 PROCEDURE — 99999 PR PBB SHADOW E&M-EST. PATIENT-LVL III: CPT | Mod: PBBFAC,,, | Performed by: PSYCHIATRY & NEUROLOGY

## 2017-07-13 PROCEDURE — 99214 OFFICE O/P EST MOD 30 MIN: CPT | Mod: S$GLB,,, | Performed by: PSYCHIATRY & NEUROLOGY

## 2017-07-13 RX ORDER — ALPRAZOLAM 0.5 MG/1
TABLET ORAL
Qty: 90 TABLET | Refills: 3 | Status: SHIPPED | OUTPATIENT
Start: 2017-07-13 | End: 2018-04-02 | Stop reason: SDUPTHER

## 2017-07-13 RX ORDER — AMITRIPTYLINE HYDROCHLORIDE 10 MG/1
TABLET, FILM COATED ORAL
Qty: 360 TABLET | Refills: 3 | Status: SHIPPED | OUTPATIENT
Start: 2017-07-13 | End: 2019-09-20 | Stop reason: SDUPTHER

## 2017-07-13 NOTE — PROGRESS NOTES
Subjective:       Patient ID: Renée Goff is a 51 y.o. female.    Chief Complaint: Headache  INTERVAL HISTORY  The patient states that memory for the amnestic episode is coming back. In the interim, she was evaluated by Neuropsychologist, ISAAC Schwarz who found no evidence of dementia. There is a suggestion that the amnestic episode was psychogenic in nature. She has only had 2 headache episodes since I last saw her, she took 1/2 tab of Fioricet with complete resolution.  HPI   The patient is a 51 y.o. female who was admitted to the hospital on 1/14/17 for possible stroke. She reported right sided weakness and numbness with no speech difficulty associated with headache and memory loss. There were no no clear precipitating factors. She underwent a head CT, a brain MRI, an US of carotids and a thorough cardia evaluation all of which was normal. The  states that all of a sudden a severe headache that had needed IV morphine resolved. It was a sudden and unexpected resolution. She is back to her baseline. Her  states that in the past she was diagnosed with ASD and that it was closed in 1996. She has history of strokes, however, imaging studies fail to reveal evidence of past infarcts.Her  states that she has lost memory for 2 years, to the extent that did not know that she had grandchildren or that her mother passed away.  Please see details of headache characteristics headache below.  1. When did your Headaches start?   20 years ago        2. Where are your headaches located?  Forehead and eyes        3. Your headache's characteristics:  Excruciating, Pressure, Stabbing, Sharp        4. How long does the headache last?  days        5. How often does the headache occur?  weekly        6. Are your headaches preceded or accompanied by other symptoms? yes  If yes, please describe. Memory loss and nausea        7. Does the headache awaken you at night? no  If so, how often?            8. Please denise  the word that best describes your headache's intensity:   severe        9. Using a scale of 1 through 10, with 0 = no pain and 10 = the worst pain:  What score is your headache now? 0  What score is your headache at its worst? 10  What score is your headache at its best? 3      10. Possible associated headache symptoms:  [x]  Sensitivity to light  [x] Dizziness  [] Nasal or sinus pressure/ pain   [x] Sensitivity to noise  [x] Vertigo  [x] Problems with concentration  [] Sensitivity to smells  [] Ringing in ears  [x] Problems with memory    [x] Blurred vision  [x] Irritability  [x] Problems with task completion   [x] Double vision  [] Anger  [x]  Problems with relaxation  [x] Loss of appetite  [x] Anxiety  [] Neck tightness, Neck pain  [x] Nausea   [] Nasal congestion  [x] Vomiting           11. Headache improving factors:  [x] Sleep  [x] Heat  [x] Darkness  [x] Ice  [] Local pressure [] Menses (period)  [] Massage   [x] Medications:         12. Headache worsening factors:   [] Fatigue [] Sneezing  [] Changes in Weather  [x] Light [x] Bending Over [x] Stress  [x] Noise [] Ovulation  [] Multiple Sclerosis Flare-Up  [] Smells  [] Menses  [] Food   [] Coughing [] Alcohol        13. Number of caffeinated drinks per day: 3        14. Number of diet drinks per day: 0        Review of Systems   Constitutional: Positive for activity change and fatigue. Negative for appetite change and fever.   HENT: Negative for congestion, dental problem, hearing loss, sinus pressure, tinnitus, trouble swallowing and voice change.    Eyes: Positive for photophobia and pain. Negative for redness and visual disturbance.   Respiratory: Positive for chest tightness and shortness of breath. Negative for cough.    Cardiovascular: Positive for chest pain. Negative for palpitations and leg swelling.   Gastrointestinal: Positive for abdominal pain, nausea and vomiting. Negative for blood in stool.   Endocrine: Negative for cold intolerance and heat  intolerance.   Genitourinary: Negative for difficulty urinating, frequency, menstrual problem and urgency.   Musculoskeletal: Positive for back pain. Negative for arthralgias, gait problem, joint swelling, myalgias, neck pain and neck stiffness.   Skin: Positive for color change.   Neurological: Positive for dizziness, light-headedness and headaches. Negative for tremors, seizures, syncope, facial asymmetry, speech difficulty, weakness and numbness.   Hematological: Negative for adenopathy. Does not bruise/bleed easily.   Psychiatric/Behavioral: Positive for confusion and decreased concentration. Negative for agitation, behavioral problems, self-injury, sleep disturbance and suicidal ideas. The patient is nervous/anxious. The patient is not hyperactive.          Past Medical History   Diagnosis Date    MI (myocardial infarction)     Stroke      Past Surgical History   Procedure Laterality Date    Coronary artery bypass graft       Family History   Problem Relation Age of Onset    Heart attack Maternal Grandfather      Social History     Social History    Marital status:      Spouse name: N/A    Number of children: N/A    Years of education: N/A     Occupational History    Not on file.     Social History Main Topics    Smoking status: Never Smoker    Smokeless tobacco: Never Used    Alcohol use No    Drug use: No    Sexual activity: Yes     Partners: Male     Other Topics Concern    Not on file     Social History Narrative     Review of patient's allergies indicates:  No Known Allergies    Current Outpatient Prescriptions:     alprazolam (XANAX) 0.5 MG tablet, Take 0.5 mg by mouth nightly as needed.  , Disp: , Rfl:     amitriptyline (ELAVIL) 10 MG tablet, Take 40 mg by mouth every evening. , Disp: , Rfl:     aspirin 325 MG tablet, Take 1 tablet (325 mg total) by mouth once daily., Disp: , Rfl: 0    atorvastatin (LIPITOR) 20 MG tablet, Take 1 tablet (20 mg total) by mouth once daily., Disp: 30  tablet, Rfl: 0    butalbital-acetaminophen-caffeine -40 mg (FIORICET, ESGIC) -40 mg per tablet, Take 1 tablet by mouth every 4 (four) hours as needed for Headaches., Disp: 15 tablet, Rfl: 0    meloxicam (MOBIC) 7.5 MG tablet, Take 7.5 mg by mouth 2 (two) times daily., Disp: , Rfl:     multivitamin (MULTIVITAMIN) per tablet, Take 1 tablet by mouth once daily.  , Disp: , Rfl:     propranolol (INDERAL) 40 MG tablet, Take 1 tablet (40 mg total) by mouth 2 (two) times daily., Disp: 180 tablet, Rfl: 3      Objective:      Vitals:    07/13/17 1341   BP: 129/74   Pulse: 68   Resp: 18   Temp: 98.4 °F (36.9 °C)     Body mass index is 30.55 kg/m².      Physical Exam    Constitutional:   She appears well-developed and well-nourished. She is well groomed  HENT:    Head: Atraumatic, oral and nasal mucosa intact  Eyes: Conjunctivae and EOM are normal. Pupils are equal, round, and reactive to light OU  Neck: Neck supple. No thyromegaly present  Cardiovascular: Normal rate and normal heart sounds  No murmur heard  Pulmonary/Chest: Effort normal and breath sounds normal  Skin: Skin is warm and dry  Psychiatric: Anxious affect     Neuro exam:    Mental status: MOCA score 22/30  Awake, attentive, Alert, oriented to self, place, year and month  Language function is intact    Cranial Nerves:  Smell was not formally evaluated  Cranial Nerves II - XII: intact  Pursuits were smooth, normal saccades, no nystagmus OU  Funduscopic exam - disc were flat and pink, no exudates or hemorrhages OU  Motor - facial movement was symmetrical and normal     Palate moved well and was symmetrical with normal palatal and oral sensation  Tongue movements were full    Coordination:     Rapid alternating movements and rapid finger tapping - normal bilaterally  Finger to nose - normal and symmetric bilaterally       Motor:  Normal muscle bulk and symmetry. No fasciculations were noted    No pronator drift  Strength 5/5 bilaterally      Reflexes:  Tendon reflexes were 2 + at biceps, triceps, brachioradialis, patellar, and Achilles bilaterally  No clonus was noted     Sensory: Intact to light touch, pin prick in all extremities. Vibration and proprioception intact in all extremities.     Gait: Slow pace, hesitant    Review of Data: Normal ct of the head, normal brain MRI, normal US of carotids        Assessment and Plan   The patient is a 51 y.o. female who was admitted to the hospital on 1/14/17for evaluation for possible stroke. Stroke was ruled out by exam, normal head CT, US of carotids, and MRI of the brain.     Continue with current migraine prevention regime. Only 2 headaches since I last saw her    Memory loss of unknown cause  -    Neuropsychology evaluation does not suggest dementia. Memory improving    Counseling:  More than 50% of the 2.5 minute encounter was spent face to face counseling the patient regarding current status and future plan of care as well as side of the medications. All questions were answered to patient's satisfaction    Carlos Mackey M.D  Medical Director, Headache and Facial Pain  [facility]

## 2017-07-17 ENCOUNTER — TELEPHONE (OUTPATIENT)
Dept: NEUROLOGY | Facility: CLINIC | Age: 52
End: 2017-07-17

## 2017-07-17 NOTE — TELEPHONE ENCOUNTER
----- Message from Jazmyn Parker sent at 7/17/2017  9:10 AM CDT -----  Contact: [facility]  [facility] calling about prescription sent over on 7/13 the directions say 90 day supply but hey need directions to put on the label. Rx- Xanax....  [facility] [address] [place] [address] [facility]  [address] [address] [facility] [address]  Phone: [phone] Fax: [phone]

## 2017-07-19 ENCOUNTER — TELEPHONE (OUTPATIENT)
Dept: NEUROLOGY | Facility: CLINIC | Age: 52
End: 2017-07-19

## 2017-07-19 NOTE — TELEPHONE ENCOUNTER
----- Message from Dena Martin sent at 7/19/2017 12:01 PM CDT -----  Contact: [facility]/[phone] ref# [id number]  Clarification on Rx Elavil.  Please call [facility] at [phone] reference # [id number]

## 2017-09-01 DIAGNOSIS — Z12.11 COLON CANCER SCREENING: ICD-10-CM

## 2017-10-31 ENCOUNTER — OFFICE VISIT (OUTPATIENT)
Dept: NEUROLOGY | Facility: CLINIC | Age: 52
End: 2017-10-31
Payer: COMMERCIAL

## 2017-10-31 VITALS
WEIGHT: 181 LBS | HEART RATE: 75 BPM | RESPIRATION RATE: 19 BRPM | BODY MASS INDEX: 30.9 KG/M2 | SYSTOLIC BLOOD PRESSURE: 129 MMHG | DIASTOLIC BLOOD PRESSURE: 75 MMHG | HEIGHT: 64 IN

## 2017-10-31 DIAGNOSIS — F41.1 GAD (GENERALIZED ANXIETY DISORDER): ICD-10-CM

## 2017-10-31 DIAGNOSIS — G43.009 MIGRAINE WITHOUT AURA AND WITHOUT STATUS MIGRAINOSUS, NOT INTRACTABLE: Primary | ICD-10-CM

## 2017-10-31 PROCEDURE — 99999 PR PBB SHADOW E&M-EST. PATIENT-LVL III: CPT | Mod: PBBFAC,,, | Performed by: PSYCHIATRY & NEUROLOGY

## 2017-10-31 PROCEDURE — 99214 OFFICE O/P EST MOD 30 MIN: CPT | Mod: S$GLB,,, | Performed by: PSYCHIATRY & NEUROLOGY

## 2017-10-31 NOTE — PROGRESS NOTES
Subjective:       Patient ID: Renée Goff is a 51 y.o. female.    Chief Complaint: Headache  INTERVAL HISTORY  The patient is doing much better. Only 5 headaches in August, none in September, two in October. Her memory is stable, no more amnestic episodes. She was evaluated by Neuropsychologist, ISAAC Schwarz who found no evidence of dementia. There is a suggestion that the amnestic episode was psychogenic in nature. She is on Inderal 40 mg QAm and Elavil 40 mg QHS, takes Aleve early in the course of the attack and if no relief, takes 1/2 tab of Fioricet with complete resolution.  HPI   The patient is a 51 y.o. female who was admitted to the hospital on 1/14/17 for possible stroke. She reported right sided weakness and numbness with no speech difficulty associated with headache and memory loss. There were no no clear precipitating factors. She underwent a head CT, a brain MRI, an US of carotids and a thorough cardia evaluation all of which was normal. The  states that all of a sudden a severe headache that had needed IV morphine resolved. It was a sudden and unexpected resolution. She is back to her baseline. Her  states that in the past she was diagnosed with ASD and that it was closed in 1996. She has history of strokes, however, imaging studies fail to reveal evidence of past infarcts.Her  states that she has lost memory for 2 years, to the extent that did not know that she had grandchildren or that her mother passed away.  Please see details of headache characteristics headache below.  1. When did your Headaches start?   20 years ago        2. Where are your headaches located?  Forehead and eyes        3. Your headache's characteristics:  Excruciating, Pressure, Stabbing, Sharp        4. How long does the headache last?  days        5. How often does the headache occur?  weekly        6. Are your headaches preceded or accompanied by other symptoms? yes  If yes, please describe. Memory loss  and nausea        7. Does the headache awaken you at night? no  If so, how often?            8. Please denise the word that best describes your headache's intensity:   severe        9. Using a scale of 1 through 10, with 0 = no pain and 10 = the worst pain:  What score is your headache now? 0  What score is your headache at its worst? 10  What score is your headache at its best? 3      10. Possible associated headache symptoms:  [x]  Sensitivity to light  [x] Dizziness  [] Nasal or sinus pressure/ pain   [x] Sensitivity to noise  [x] Vertigo  [x] Problems with concentration  [] Sensitivity to smells  [] Ringing in ears  [x] Problems with memory    [x] Blurred vision  [x] Irritability  [x] Problems with task completion   [x] Double vision  [] Anger  [x]  Problems with relaxation  [x] Loss of appetite  [x] Anxiety  [] Neck tightness, Neck pain  [x] Nausea   [] Nasal congestion  [x] Vomiting           11. Headache improving factors:  [x] Sleep  [x] Heat  [x] Darkness  [x] Ice  [] Local pressure [] Menses (period)  [] Massage   [x] Medications:         12. Headache worsening factors:   [] Fatigue [] Sneezing  [] Changes in Weather  [x] Light [x] Bending Over [x] Stress  [x] Noise [] Ovulation  [] Multiple Sclerosis Flare-Up  [] Smells  [] Menses  [] Food   [] Coughing [] Alcohol        13. Number of caffeinated drinks per day: 3        14. Number of diet drinks per day: 0        Review of Systems   Constitutional: Positive for activity change and fatigue. Negative for appetite change and fever.   HENT: Negative for congestion, dental problem, hearing loss, sinus pressure, tinnitus, trouble swallowing and voice change.    Eyes: Positive for photophobia and pain. Negative for redness and visual disturbance.   Respiratory: Positive for chest tightness and shortness of breath. Negative for cough.    Cardiovascular: Positive for chest pain. Negative for palpitations and leg swelling.   Gastrointestinal: Positive for abdominal  pain, nausea and vomiting. Negative for blood in stool.   Endocrine: Negative for cold intolerance and heat intolerance.   Genitourinary: Negative for difficulty urinating, frequency, menstrual problem and urgency.   Musculoskeletal: Positive for back pain. Negative for arthralgias, gait problem, joint swelling, myalgias, neck pain and neck stiffness.   Skin: Positive for color change.   Neurological: Positive for dizziness, light-headedness and headaches. Negative for tremors, seizures, syncope, facial asymmetry, speech difficulty, weakness and numbness.   Hematological: Negative for adenopathy. Does not bruise/bleed easily.   Psychiatric/Behavioral: Positive for confusion and decreased concentration. Negative for agitation, behavioral problems, self-injury, sleep disturbance and suicidal ideas. The patient is nervous/anxious. The patient is not hyperactive.          Past Medical History   Diagnosis Date    MI (myocardial infarction)     Stroke      Past Surgical History   Procedure Laterality Date    Coronary artery bypass graft       Family History   Problem Relation Age of Onset    Heart attack Maternal Grandfather      Social History     Social History    Marital status:      Spouse name: N/A    Number of children: N/A    Years of education: N/A     Occupational History    Not on file.     Social History Main Topics    Smoking status: Never Smoker    Smokeless tobacco: Never Used    Alcohol use No    Drug use: No    Sexual activity: Yes     Partners: Male     Other Topics Concern    Not on file     Social History Narrative     Review of patient's allergies indicates:  No Known Allergies    Current Outpatient Prescriptions:     alprazolam (XANAX) 0.5 MG tablet, Take 0.5 mg by mouth nightly as needed.  , Disp: , Rfl:     amitriptyline (ELAVIL) 10 MG tablet, Take 40 mg by mouth every evening. , Disp: , Rfl:     aspirin 325 MG tablet, Take 1 tablet (325 mg total) by mouth once daily., Disp:  , Rfl: 0    atorvastatin (LIPITOR) 20 MG tablet, Take 1 tablet (20 mg total) by mouth once daily., Disp: 30 tablet, Rfl: 0    butalbital-acetaminophen-caffeine -40 mg (FIORICET, ESGIC) -40 mg per tablet, Take 1 tablet by mouth every 4 (four) hours as needed for Headaches., Disp: 15 tablet, Rfl: 0    meloxicam (MOBIC) 7.5 MG tablet, Take 7.5 mg by mouth 2 (two) times daily., Disp: , Rfl:     multivitamin (MULTIVITAMIN) per tablet, Take 1 tablet by mouth once daily.  , Disp: , Rfl:     propranolol (INDERAL) 40 MG tablet, Take 1 tablet (40 mg total) by mouth 2 (two) times daily., Disp: 180 tablet, Rfl: 3      Objective:      Vitals:    10/31/17 1315   BP: 129/75   Pulse: 75   Resp: 19     Body mass index is 31.07 kg/m².    Physical Exam    Constitutional:   She appears well-developed and well-nourished. She is well groomed  HENT:    Head: Atraumatic, oral and nasal mucosa intact  Eyes: Conjunctivae and EOM are normal. Pupils are equal, round, and reactive to light OU  Neck: Neck supple. No thyromegaly present  Cardiovascular: Normal rate and normal heart sounds  No murmur heard  Pulmonary/Chest: Effort normal and breath sounds normal  Skin: Skin is warm and dry  Psychiatric: Anxious affect     Neuro exam:    Mental status: MOCA score 22/30  Awake, attentive, Alert, oriented to self, place, year and month  Language function is intact    Cranial Nerves:  Smell was not formally evaluated  Cranial Nerves II - XII: intact  Pursuits were smooth, normal saccades, no nystagmus OU  Funduscopic exam - disc were flat and pink, no exudates or hemorrhages OU  Motor - facial movement was symmetrical and normal     Palate moved well and was symmetrical with normal palatal and oral sensation  Tongue movements were full    Coordination:     Rapid alternating movements and rapid finger tapping - normal bilaterally  Finger to nose - normal and symmetric bilaterally       Motor:  Normal muscle bulk and symmetry. No  fasciculations were noted    No pronator drift  Strength 5/5 bilaterally     Reflexes:  Tendon reflexes were 2 + at biceps, triceps, brachioradialis, patellar, and Achilles bilaterally  No clonus was noted     Sensory: Intact to light touch, pin prick in all extremities. Vibration and proprioception intact in all extremities.     Gait: Slow pace, hesitant    Review of Data: Normal ct of the head, normal brain MRI, normal US of carotids        Assessment and Plan   The patient is a 51 y.o. female who was admitted to the hospital on 1/14/17for evaluation for possible stroke. Stroke was ruled out by exam, normal head CT, US of carotids, and MRI of the brain.     Continue with current migraine prevention regime, Inderal 40 mg QAM and Elavil 40 mg QHS, Aleve early in the attack and rescue with Fioricet  She had 5 attacks in August, none in September and 2 in October (mild)    Memory loss of unknown cause  -    Neuropsychology evaluation does not suggest dementia. Memory improving  RTC in 4 months with diaries      Carlos Mackey M.D  Medical Director, Headache and Facial Pain  [facility]

## 2017-11-09 ENCOUNTER — OFFICE VISIT (OUTPATIENT)
Dept: CARDIOLOGY | Facility: CLINIC | Age: 52
End: 2017-11-09
Payer: COMMERCIAL

## 2017-11-09 ENCOUNTER — LAB VISIT (OUTPATIENT)
Dept: LAB | Facility: HOSPITAL | Age: 52
End: 2017-11-09
Attending: INTERNAL MEDICINE
Payer: COMMERCIAL

## 2017-11-09 VITALS
OXYGEN SATURATION: 97 % | WEIGHT: 178.13 LBS | BODY MASS INDEX: 30.41 KG/M2 | SYSTOLIC BLOOD PRESSURE: 125 MMHG | HEART RATE: 71 BPM | DIASTOLIC BLOOD PRESSURE: 71 MMHG | HEIGHT: 64 IN

## 2017-11-09 DIAGNOSIS — E78.00 HYPERCHOLESTEROLEMIA: ICD-10-CM

## 2017-11-09 DIAGNOSIS — R07.9 CHEST PAIN, UNSPECIFIED TYPE: ICD-10-CM

## 2017-11-09 DIAGNOSIS — E65 ABDOMINAL OBESITY: ICD-10-CM

## 2017-11-09 DIAGNOSIS — R07.9 CHEST PAIN, UNSPECIFIED TYPE: Primary | ICD-10-CM

## 2017-11-09 DIAGNOSIS — R53.1 RIGHT SIDED WEAKNESS: ICD-10-CM

## 2017-11-09 DIAGNOSIS — Z91.89 CARDIOVASCULAR RISK FACTOR: Primary | ICD-10-CM

## 2017-11-09 DIAGNOSIS — Q21.10 ASD (ATRIAL SEPTAL DEFECT): ICD-10-CM

## 2017-11-09 DIAGNOSIS — Z91.89 CARDIOVASCULAR RISK FACTOR: ICD-10-CM

## 2017-11-09 PROBLEM — I21.9 MI (MYOCARDIAL INFARCTION): Status: RESOLVED | Noted: 2017-01-14 | Resolved: 2017-11-09

## 2017-11-09 LAB
CHOLEST SERPL-MCNC: 127 MG/DL
CHOLEST/HDLC SERPL: 3.1 {RATIO}
CRP SERPL-MCNC: 6.63 MG/L
HDLC SERPL-MCNC: 41 MG/DL
HDLC SERPL: 32.3 %
LDLC SERPL CALC-MCNC: 63.6 MG/DL
NONHDLC SERPL-MCNC: 86 MG/DL
TRIGL SERPL-MCNC: 112 MG/DL

## 2017-11-09 PROCEDURE — 93000 ELECTROCARDIOGRAM COMPLETE: CPT | Mod: S$GLB,,, | Performed by: INTERNAL MEDICINE

## 2017-11-09 PROCEDURE — 86141 C-REACTIVE PROTEIN HS: CPT

## 2017-11-09 PROCEDURE — 80061 LIPID PANEL: CPT

## 2017-11-09 PROCEDURE — 36415 COLL VENOUS BLD VENIPUNCTURE: CPT

## 2017-11-09 PROCEDURE — 99215 OFFICE O/P EST HI 40 MIN: CPT | Mod: S$GLB,,, | Performed by: INTERNAL MEDICINE

## 2017-11-09 PROCEDURE — 99999 PR PBB SHADOW E&M-EST. PATIENT-LVL III: CPT | Mod: PBBFAC,,, | Performed by: INTERNAL MEDICINE

## 2017-11-09 RX ORDER — BUTALBITAL, ACETAMINOPHEN AND CAFFEINE 50; 325; 40 MG/1; MG/1; MG/1
1 TABLET ORAL EVERY 4 HOURS PRN
COMMUNITY
End: 2018-07-20 | Stop reason: SDUPTHER

## 2017-11-09 NOTE — PROGRESS NOTES
Subjective:    Patient ID:  Renée Goff is a 52 y.o. female who presents for evaluation of No chief complaint on file.  For medication renewal, prior ASD with CVA, palpitations, right-sided weakness with memory loss in 1/2017  PCP: Dr. Khan, [facility]   Prior Cardiologist: Dr. Jenkins  ENT: Dr. Mensah  Neurologist: Dr. Henderson, now Dr. 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    HPI Comments: 47 y.o. female admitted to Hospitalist Service from [facility] Room with complaint of chest pain 1998, history of CVA 1996 and history of migrain headache. Patient d, she was in her usual state of health, suddenly started experiencing substernal chest Pain, non-radiating, felt SOB. CP was constant. Chest pain was moderate in intensity.  No recent angina like complaints. Last cardiac stress test was in 1998. Patient was admitted to Hospitalist medicine service. Patient was evaluated by Dr. Chavira. Serial cardiac enzymes were negative.  Patient underwent Lexiscan which was negative for ischemia. During hospital stay, patient had a migrain headache which was medically managed. Patient was discharged home in stable condition with following discharge plan of care.     Cardiac evaluation, 11/2012:  ECHO CONCLUSIONS                                                                  1 - Mild left ventricular enlargement.                                       2 - Low normal left ventricular function (EF 51%).                           3 - Normal diastolic function.                                               4 - Right ventricle is upper limit of normal in size with normal             systolic function    Lexiscan:  Nuclear Quantitative Functional Analysis:                                    LVEF: 53 % (normal is 55 - 69)                                               LVED Volume: 105 ml (normal is  60 - 98)                                      LVES Volume: 49 ml (normal is 20 - 42)                                                                                                                    Impression: NORMAL MYOCARDIAL PERFUSION                                      1. The perfusion scan is free of evidence for myocardial ischemia or         injury.                                                                      2. There is a moderate intensity fixed defect in the anteroapical wall of    the left ventricle, secondary to breast attenuation.                         3. Resting wall motion is physiologic.                                       4. There is resting LV dysfunction with a reduced ejection fraction of 53    %.  (normal is 55 - 69)                                                      5. The ventricular volumes are normal at rest and stress.                    6. The extracardiac distribution of radioactivity is normal.    Since visit of 12/3/2012, no new problem, improve situation at home, caring for 3 grandson, bike twice a week for about 30 minutes with occasional walk, no regular exercise routine. Still working part-time. Denies any symptoms. EKG today NSR, rate of 70 with anterior T waves inversion.    Since visit of 1/27/2014, began experiencing sudden onset of vertigo after Maryanne, accompanied by nausea and unable to stand nor walk. Can last up to a few days, usually a day and a half. No fall. No problem with home medications. Migraine not a problem. 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 ECG continue to show anterior T-waves inversion. No CP unless out of propranolol. Last lipid in record, done 11/2012, showed LDL-C 119. ASCVD 10-year risk is only 0.74%, very, very very low.    In 10/2016, no new problem, less stress, now caring for grandchildren, active walking with stroller every other day and biking every other day. Noted some increase chest pounding this month, last up to  "20 minutes, feels real anxious, no additional medication. Some SOB. Migraines are less, may be once every other week but can last up to 2 days. Mother passed in 11/2015, age 72.     In 11/2017, here for annual reviewed, had sudden onset of right-sided weakness with memory loss in 1/2017, hospitalized for 2 days, was on full dose ASA at the time.  DCS - "Hospital Course:   Patient is a 51 y.o. female admitted to Hospitalist Service from [facility] Room with complaint of chest pain for 1 day. Patient reportedly has past medical history significant for hypertension, CAD, history of AMI and CVA. Patient reported chest pain started last night which was radiating to arms and upper back. Patient has also been experiencing headache. No head injury, LOC or seizure activity reported. Patient denied any focal deficits. No associated nausea or diaphorsis reported. No leg swelling of calf tenderness. Patient denied shortness of breath, abdominal pain, nausea, vomiting, headache, vision changes, focal neuro-deficits, cough or fever. Patient was admitted to Hospitalist medicine service. Patient was evaluated by Dr. Zamora and Dr. Chavira. Patient was monitored on telemetry medical floor, serial cardiac enzymes remained negative. Patient was evaluated by cardiologist and had further cardiac workup as mentioned below, which was negative for acute ischemia. Patient's symptoms resolved. MRI brain did not confirm CVA. Neurological symptoms resolved. Patient encouraged to follow up closely and have amnesia work up completed. Patient was discharged home in stable condition with following discharge plan of care.      Consults: Dr. Zamora and Dr. Chavira  Lexiscan:  1. The perfusion scan is free of evidence for myocardial ischemia or injury.   2. There is abnormal wall motion at rest showing moderate hypokinesis of the septal wall of the left ventricle.   3. Resting LV function is normal.  (normal is 55 - 69)  4. The ventricular " "volumes are normal at rest and stress.   5. The extracardiac distribution of radioactivity is normal.   6. There is evidence of right ventricular hypertrophy.   7. When compared to the previous study from 11/13/2012, the RV appears more prominent on current study.     MRI Brain: Negative MRI of the brain.  Chronic right maxillary and ethmoid sinusitis.  Carotid US: Significant atherosclerotic plaque or calcification is not identified in the carotid arteries by real-time ultrasound or NASCET criteria      ECHO:     1 - Low normal left ventricular systolic function (EF 50-55%).     2 - Normal left ventricular diastolic function.     3 - Right ventricle is upper limit of normal in size with normal systolic function.     4 - The estimated PA systolic pressure is greater than 31 mmHg.     5 - Difficult windows, even with the use of Optison contrast.  Unable to adequately assess for discrete wall motion abnormalities .     6 - Compared to echo from 11/13/2012, no significant change."     Lipid .4, ASCVD 10-year event risk only 1.7%, was placed on atorvastatin 20 mg, no repeat lipid obtained. For the past month noted recurrent right sided CP, like a pressure radiating to the back, last up to half an hour, intensity rated 6/10, no associated symptoms, able to return to sleep. No known inciting factor, don't feel stress. Active with walking daily for half an hour, no problem excepted noted some heart racing. ECG is essentially normal , rate 72, non-specific STA. Compliant with medications, never smoked, very occasional alcohol. Uses Mobic very rarely for pain in the shoulder.         ROS     Constitutional: negative for chills, fatigue, fevers, sweats, gained 8 lbs since last visit.  HENT: some sinus congestion, with hoarseness and sore throat.  Eyes: negative for icterus, redness and visual disturbance  Respiratory: negative for asthma, cough, dyspnea on exertion, hemoptysis, pleurisy/chest pain and wheezing, [place] " "score 2 today 3  Cardiovascular: positive for right-side chest pain, no chest pressure/discomfort, dyspnea, near-syncope, positive for palpitations, no paroxysmal nocturnal dyspnea and syncope  Gastrointestinal: negative for abdominal pain, nausea and vomiting  Musculoskeletal: negative for arthralgias, muscle weakness and myalgias, some back pain with bending, moving  Neurological: negative for coordination problems, gait problems, tremors, no further dizziness and vertigo, positive for headaches / migraine with nausea, and paresthesia, occasional migraine HA, can last up to 2 days. Some difficulties in concentration and coordination, no fall but do lose balance easily.  Psych: no depression or anxiety, no further memory loss, some anxiety    Objective:    Physical Exam     General appearance: alert, appears stated age, cooperative and no distress  Head: Normocephalic, without obvious abnormality, atraumatic  Eyes:  conjunctivae/corneas clear. PERRL, EOM's intact.   Neck: no adenopathy, no carotid bruit, no JVD, supple, symmetrical, trachea midline and thyroid not enlarged, symmetric, no tenderness/mass/nodules  Lungs:  clear to auscultation bilaterally  Chest wall: some localized right parasternal tenderness with pressure  Heart: regular rate and rhythm, S1, S2 normal, no murmur, click, rub or gallop   Abdomen: soft, non-tender; bowel sounds normal; no masses,  no organomegaly, waist circumference 35.25" increased to 38", hip 41"  Extremities: extremities normal, atraumatic, no cyanosis or edema  Pulses: 2+ and symmetric    Assessment:       1. Cardiovascular risk factor, ASCVD 10-year risk 0.7%, 2012, 1.7% in 2017    2. Chest pain, unspecified type    3. ASD (atrial septal defect), closed 12/1996    4. Right sided weakness (rule out new CVA), 1/2017    5. Abdominal obesity    6. BMI 29.3 - 29.9, today 30.5    7. Hypercholesterolemia, baseline .4         Plan:       Renée MORENO was seen today for " follow-up.    Diagnoses and associated orders for this visit:    Cardiovascular risk factor, ASCVD 10-year risk 0.7%, 2012, 1.7% in 2017  -     High sensitivity CRP (Cardiac CRP); Future; Expected date: 11/23/2017  -     Lipid panel; Future; Expected date: 11/23/2017    Chest pain, unspecified type  -     EKG 12-lead    ASD (atrial septal defect), closed 12/1996    Right sided weakness (rule out new CVA), 1/2017  -     High sensitivity CRP (Cardiac CRP); Future; Expected date: 11/23/2017    Abdominal obesity    BMI 29.3 - 29.9, today 30.5    Hypercholesterolemia, baseline .4  -     High sensitivity CRP (Cardiac CRP); Future; Expected date: 11/23/2017  -     Lipid panel; Future; Expected date: 11/23/2017    - CV status stable, continue current Rx, all medications reviewed, patient acknowledge good understanding.  - Recommend at least annual cardiovascular evaluation in view of her significant risk factors.  - Recommend Yoga or Arturo Chi  - Can use sport cream as needed.  - Weigh twice weekly, try to lose 1-2 lbs per week  - Phone review / encourage use of [facility]    Stress at home - improved       Patient Active Problem List   Diagnosis    Cerebrovascular accident (CVA) due to embolism of left middle cerebral artery. 1996    ASD (atrial septal defect), closed 12/1996    Abnormal EKG    KIRA (generalized anxiety disorder)    Abnormal CT of the head    Perennial sinusitis    Migraine    BMI 29.3 - 29.9, today 30.5    Abdominal obesity    Cardiovascular risk factor, ASCVD 10-year risk 0.7%, 2012, 1.7% in 2017    Chest pain, unspecified    Right sided weakness (rule out new CVA), 1/2017    Hypercholesterolemia, baseline .4     Total face-to-face time with the patient was 40 minutes and greater than 50% was spent in counseling and coordination of care. The above assessment and plan have been discussed at length. Labs and procedure over the last 6 months reviewed. Problem List updated. Asked to  bring in all active medications / pills bottles with next visit.

## 2017-11-13 ENCOUNTER — TELEPHONE (OUTPATIENT)
Dept: CARDIOLOGY | Facility: CLINIC | Age: 52
End: 2017-11-13

## 2017-11-13 NOTE — TELEPHONE ENCOUNTER
----- Message from Kemal Jacob sent at 11/13/2017  9:33 AM CST -----  Contact: patient  Patient states that she is going to have oral surgery and the dentist is requesting for a letter of a clearance.  Can you please fax over the clearance to:  [phone]    Dentist:  [facility]    Can you please call the patient back at [phone].  Thank you

## 2017-11-13 NOTE — TELEPHONE ENCOUNTER
Returned pts phone call. Pt said she is having a dental procedure done and needs clearance. Called pt to let her know her dentist has to fax over the request. Called pts dentist to have them fax over the request, they said they are going to fax the request over.

## 2017-12-05 ENCOUNTER — TELEPHONE (OUTPATIENT)
Dept: CARDIOLOGY | Facility: CLINIC | Age: 52
End: 2017-12-05

## 2017-12-05 NOTE — TELEPHONE ENCOUNTER
----- Message from Giacomo Chavira MD sent at 12/5/2017  2:52 PM CST -----  Contact: Patient  It was send to her [facility]. You should be able to review these with the patient. Can always review the office note. Her LDL have improved, she have elevated inflammatory marker. Will follow in the future. Treatment for elevated hsCRP include statin use, exercise, and healthy living habits.    Please keep this for reference for future similar inquiries from patients.  Thanks,  Dr. Chavira    ----- Message -----  From: Estefani Nolasco LPN  Sent: 12/5/2017   2:03 PM  To: MD Dr. Fan Jasso,    Pt called wanting to know her results from her lab work she did on 11/9/2017?    Please advise if there is anything you would like me to tell her.    Thank you,  Estefani    ----- Message -----  From: Aislinn Molina  Sent: 12/5/2017   1:55 PM  To: [facility] Staff    Patient is calling to find out the results of her lab work from 11/9.  Call Back#[phone]  Thanks

## 2017-12-05 NOTE — TELEPHONE ENCOUNTER
----- Message from Aislinn Molina sent at 12/5/2017  1:55 PM CST -----  Contact: Patient  Patient is calling to find out the results of her lab work from 11/9.  Call Back#[phone]  Thanks

## 2017-12-05 NOTE — TELEPHONE ENCOUNTER
Called pt per Dr. Chavira's request to let her know LDL has improved, and she has elevated inflammatory marker that Dr. Chavira will follow in the future. Informed pt that Dr. Chavira said the treatment is statin use, exercise and healthy living habits. Informed pt Dr. Chavira said her labs have been posted on [facility]. Pt verbalized understanding. No further issues discussed.

## 2018-01-09 DIAGNOSIS — R00.2 HEART PALPITATIONS: ICD-10-CM

## 2018-01-09 DIAGNOSIS — G43.109 MIGRAINE WITH AURA AND WITHOUT STATUS MIGRAINOSUS, NOT INTRACTABLE: ICD-10-CM

## 2018-01-09 RX ORDER — PROPRANOLOL HYDROCHLORIDE 40 MG/1
40 TABLET ORAL 2 TIMES DAILY
Qty: 180 TABLET | Refills: 3 | Status: CANCELLED | OUTPATIENT
Start: 2018-01-09

## 2018-01-09 RX ORDER — PROPRANOLOL HYDROCHLORIDE 40 MG/1
40 TABLET ORAL 2 TIMES DAILY
Qty: 180 TABLET | Refills: 3 | Status: SHIPPED | OUTPATIENT
Start: 2018-01-09 | End: 2019-01-15 | Stop reason: SDUPTHER

## 2018-01-09 NOTE — TELEPHONE ENCOUNTER
Called pt to let her know her medication refill request was sent in to mail order. Pt verbalized understanding. No further issues discussed.

## 2018-01-09 NOTE — TELEPHONE ENCOUNTER
----- Message from Janee Ontiveros sent at 1/9/2018 11:36 AM CST -----  Contact: [phone]  Patient requesting a refill on propanolol.     Patient will be using   [facility] - [place], [place] - [address]  [address]  [address]  [facility] [address]  Phone: [phone] Fax: [phone]    Please call patient at [phone]. Thanks!

## 2018-01-09 NOTE — TELEPHONE ENCOUNTER
Pt requesting refill of Propanolol (inderal) 40 mg tablet    LOV: 11/9/2017  F/U: Return in 1 year

## 2018-01-18 NOTE — PLAN OF CARE
Problem: Patient Care Overview  Goal: Plan of Care Review  Outcome: Ongoing (interventions implemented as appropriate)  Pt resting in bed,  at bedside, N/V and headache have resolved, VS stable, NAD noted, safe, will continue to monitor.         none

## 2018-03-16 ENCOUNTER — TELEPHONE (OUTPATIENT)
Dept: INTERNAL MEDICINE | Facility: CLINIC | Age: 53
End: 2018-03-16

## 2018-03-16 DIAGNOSIS — R07.9 CHEST PAIN, UNSPECIFIED TYPE: Primary | ICD-10-CM

## 2018-03-16 NOTE — TELEPHONE ENCOUNTER
----- Message from Yvonne An sent at 3/16/2018  9:47 AM CDT -----  Contact: DENTON REYES [[id number]]  x_  1st Request  _  2nd Request  _  3rd Request        Who: DENTON REYES [[id number]]    Why: Requesting a call back in regards to patient states that she has been experiencing stomach issues and would like for someone to call her.     Please return the call at earliest convenience. Thanks!    What Number to Call Back: [phone]      When to Expect a call back: (Within 24 hours)

## 2018-03-16 NOTE — TELEPHONE ENCOUNTER
----- Message from Yvonne An sent at 3/16/2018  9:47 AM CDT -----  Contact: DENTON REYES [[id number]]  x_  1st Request  _  2nd Request  _  3rd Request        Who: DENTON REYES [[id number]]    Why: Requesting a call back in regards to patient states that she has been experiencing stomach issues and would like for someone to call her.     Please return the call at earliest convenience. Thanks!    What Number to Call Back: [phone]      When to Expect a call back: (Within 24 hours)

## 2018-03-16 NOTE — TELEPHONE ENCOUNTER
Spoke with pt and she states since Jan she has been having off and on stomach pains that is now getting worst and coming more frequent. Pt also states it is moving toward her chest and some times she feels feeling like a elephant is sitting on her chest and some sob. Pt is does not have htn and these are the only symp she is having. I did inform pt if she starts to feel any worst please go to nearest ER.Please advise.

## 2018-04-02 RX ORDER — ALPRAZOLAM 0.5 MG/1
TABLET ORAL
Qty: 90 TABLET | Refills: 3 | Status: SHIPPED | OUTPATIENT
Start: 2018-04-02 | End: 2018-12-09 | Stop reason: SDUPTHER

## 2018-07-20 DIAGNOSIS — G43.009 MIGRAINE WITHOUT AURA AND WITHOUT STATUS MIGRAINOSUS, NOT INTRACTABLE: Primary | ICD-10-CM

## 2018-07-20 RX ORDER — BUTALBITAL, ACETAMINOPHEN AND CAFFEINE 50; 325; 40 MG/1; MG/1; MG/1
1 TABLET ORAL EVERY 4 HOURS PRN
Qty: 12 TABLET | Refills: 3 | Status: SHIPPED | OUTPATIENT
Start: 2018-07-20 | End: 2019-11-25 | Stop reason: SDUPTHER

## 2018-07-20 NOTE — TELEPHONE ENCOUNTER
----- Message from Dena Martin sent at 7/20/2018  9:27 AM CDT -----  Refill on Rx Fioricet.  Please send into [facility]/[phone].  Please call patient when called in at [phone].

## 2018-09-08 NOTE — PROGRESS NOTES
Psychiatry Initial Visit (PhD/LCSW)    Date:  5/24/2017                 CPT Code: 39082    Referred by: Emma Mackey M.D.    Chief complaint/reason for encounter:  Neuropsychological Evaluation    Clinical status of patient:  Outpatient    Met with:  Patient and     History of present illness: Mrs. Renée Goff is a 51 year old white  female referred for Neuropsychological Evaluation on an outpatient basis due to subjective amnesia for events for 2 years prior to January 2017.  She was hospitalized at that time for a possible stroke (which was felt not to be the case) and forgot what had happened the prior two years. Some of the events that occurred during that 2 year period include death of her mother, births of 2 grandchildren, baby showers she gave, and weddings she attended.  She stated that she lost a piece of her life after she was hospitalized. She reported that since January 2017 she is able to recall recent events but her  reported there is now a delay in her responding to things as though she has to think about things. He also reported she has to ask him what the day and the date are.  They also reported that she currently misplaces personal items in the home; forgets conversations and events; repeats herself; has to use a calendar to remind herself of things; forgets what she reads; loses her train of thought in conversation; and reports word-finding difficulty (not observed). She drives, cooks, and manages her medications and household without difficulty. Her  has always managed the family finances. They reported it seems to be staying the same over time. Family history is significant for grandmother and two uncles with Alzheimers disease and father with confusion. Mrs. Goff reportedly had 2 strokes in 1996 and was amnestic for the events of the day of the stroke but had no lasting sequelae. Mrs. Goff has a history of generalized anxiety disorder since her  strokes in 1996. Her neurologist at [facility], Dr. Tristin Henderson, prescribed Xanax and Elavil until January 2017 when she transferred her neurological care to Dr. Mackey. She is still taking Xanax and Elavil. She reported she still worries some. She declined the offer of an appointment with a psychiatrist for medication management or a  for psychotherapy. She wants Dr. Mackey to manage her medications. She was pleasant and cooperative in interview but appeared anxious.    Pain scale: noncontributory    Symptoms:  Mood: denied  Anxiety:  excessive worry  Substance abuse: denied  Cognitive functioning: amnesia for events of 2 years, delay in responding    Psychiatric history: as above    Medical history: memory loss, MI s/p CABG, CVA, ASD closed 12/1996, and migraines. CT of the head completed on 1/14/2017 was negative. MRI of the brain completed on 1/14/2017 was negative. MoCA completed in Neurology on 1/27/2017 yielded a score of 22/30, in the impaired range.     Family history of psychiatric illness: grandmother and two uncles with Alzheimers disease    Social history (marriage, employment, etc.):  High school graduate. Worked as a  at a [employer]. 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 They babysat grandchildren 3 days a week until January. Now occasionally watches one grandchild. . 2 children. Lives with . Restorationist but attends [employer]. Enjoys watching TV, playing solitaire on her phone, spending time with grandchildren.    Substance use:   Alcohol: occasional   Drugs: no   Tobacco: no   Caffeine: 2-3 cups coffee some days, ½ gallon iced tea per day - discussed switching to decaf    Strengths and Liabilities:   Strength:  Pt is expressive/articulate.   Liability:  Pt has subjective memory loss.    Mental Status Exam:  General appearance:  appears stated age, neatly dressed, well groomed  Speech:  normal rate and tone  Level of cooperation:   cooperative  Thought processes:  logical, goal-directed  Mood:  anxious  Thought content:  no illusions, no visual hallucinations, no auditory hallucinations, no delusions, no active or passive homicidal thoughts, no active or passive suicidal ideation, no obsessions, no compulsions, no violence  Affect:  appropriate  Orientation:  oriented to person, place, and time  Memory:  Recent memory:  3 of 3 objects after brief delay.    Remote memory - intact  Attention span and concentration:  spelled HOUSE forward and backwards  Fund of general knowledge: 4 of 4 recent presidents  Abstract reasoning:    Similarities: abstract.    Proverbs: no idea  Judgment and insight: intact  Language:  intact    Diagnostic impressions:  Cognitive impairment R41.89  Memory loss R41.3  KIRA F41.1    Plan:  Pt will complete Neuropsychological testing.  Report of Neuropsychological Evaluation will follow. It can be accessed through the Chart Review activity in Epic under the Notes tab.  It will be titled Psych Testing.  She declined the offer of an appointment with a psychiatrist or a  for psychotherapy. She wants Dr. Mackey to manage her medications.    Return to clinic:  as scheduled    Length of time:  50 minutes              - - -

## 2018-09-20 ENCOUNTER — TELEPHONE (OUTPATIENT)
Dept: NEUROLOGY | Facility: CLINIC | Age: 53
End: 2018-09-20

## 2018-09-24 ENCOUNTER — TELEPHONE (OUTPATIENT)
Dept: NEUROLOGY | Facility: CLINIC | Age: 53
End: 2018-09-24

## 2018-09-24 NOTE — TELEPHONE ENCOUNTER
Called and spoke with Mimi at [facility]. Advised it was okay to fill the generic Amitriptyline, per Dr. Lindo (Dr. Lindo is covering for Dr. Mackey while out of clinic). Mimi verbalized understanding.

## 2018-09-24 NOTE — TELEPHONE ENCOUNTER
----- Message from Sully Villarreal sent at 9/24/2018  9:06 AM CDT -----  Contact: Carolina with [facility]   Carolina with [facility] -[phone] reference number [id number] calling because Elavil has been discontinued so she would like to know if filling the generic amitriptyline would be okay. Please advise. Thanks.    [facility]  [address] [address]  Phone: [phone] Fax: [phone]

## 2018-12-10 RX ORDER — ALPRAZOLAM 0.5 MG/1
TABLET ORAL
Qty: 90 TABLET | Refills: 3 | Status: SHIPPED | OUTPATIENT
Start: 2018-12-10 | End: 2019-01-07 | Stop reason: SDUPTHER

## 2019-01-07 NOTE — TELEPHONE ENCOUNTER
----- Message from Eliana Yen sent at 1/7/2019 10:07 AM CST -----  Contact: DENTON REYES [[id number]]                                         MEDICATION  REFILL  REQUEST        Can the clinic reply in [facility]: NO      Please refill the medication(s) listed below. Please call the patient when the prescription(s) is ready for  at this phone number   DENTON REYES / # [phone]      Medication #1  ALPRAZolam (XANAX) 0.5 MG tablet          Preferred Pharmacy: [facility]/pharmacy #[id number] - [place], [place] - [address].     [phone] (Phone)  [phone] (Fax)

## 2019-01-08 RX ORDER — ALPRAZOLAM 0.5 MG/1
0.5 TABLET ORAL DAILY
Qty: 90 TABLET | Refills: 3 | Status: SHIPPED | OUTPATIENT
Start: 2019-01-08 | End: 2019-07-09 | Stop reason: SDUPTHER

## 2019-01-15 DIAGNOSIS — R00.2 HEART PALPITATIONS: ICD-10-CM

## 2019-01-15 DIAGNOSIS — G43.109 MIGRAINE WITH AURA AND WITHOUT STATUS MIGRAINOSUS, NOT INTRACTABLE: ICD-10-CM

## 2019-01-15 RX ORDER — PROPRANOLOL HYDROCHLORIDE 40 MG/1
40 TABLET ORAL 2 TIMES DAILY
Qty: 180 TABLET | Refills: 0 | Status: SHIPPED | OUTPATIENT
Start: 2019-01-15 | End: 2019-05-07 | Stop reason: SDUPTHER

## 2019-01-15 NOTE — TELEPHONE ENCOUNTER
----- Message from Rosanna Messer sent at 1/15/2019  8:45 AM CST -----  Contact: pt  Pt is requesting a refill on her medication(propranolol (INDERAL) 40 MG tablet). Please send to [facility].  Please call to advise  Call back   Thanks    [facility]/pharmacy #[id number] - [place] - [address]  [address] [place] [address]  Phone: [phone] Fax: [phone]

## 2019-01-24 ENCOUNTER — TELEPHONE (OUTPATIENT)
Dept: CARDIOLOGY | Facility: CLINIC | Age: 54
End: 2019-01-24

## 2019-01-24 NOTE — TELEPHONE ENCOUNTER
Call neelam to Ms. Chinchilla in regards to message left. I advised her that her Rx is waiting at the  for her. She verbalized understanding. No further issues noted.

## 2019-01-24 NOTE — TELEPHONE ENCOUNTER
----- Message from Tana Dickson sent at 1/24/2019 12:47 PM CST -----  Contact: pt  Type: Needs Medical Advice    Who Called:  Pt  Best Call Back Number: [phone] (home)   Additional Information:Patient want to know if her Rx is ready to be picked up   propranolol (INDERAL) 40 MG tablet would like a call back before she is on her way to make sure it is at the desk. thanks

## 2019-01-31 ENCOUNTER — OFFICE VISIT (OUTPATIENT)
Dept: INTERNAL MEDICINE | Facility: CLINIC | Age: 54
End: 2019-01-31
Attending: FAMILY MEDICINE
Payer: COMMERCIAL

## 2019-01-31 ENCOUNTER — LAB VISIT (OUTPATIENT)
Dept: LAB | Facility: OTHER | Age: 54
End: 2019-01-31
Attending: FAMILY MEDICINE
Payer: COMMERCIAL

## 2019-01-31 VITALS
SYSTOLIC BLOOD PRESSURE: 112 MMHG | OXYGEN SATURATION: 96 % | BODY MASS INDEX: 29.66 KG/M2 | HEIGHT: 64 IN | HEART RATE: 63 BPM | WEIGHT: 173.75 LBS | DIASTOLIC BLOOD PRESSURE: 80 MMHG

## 2019-01-31 DIAGNOSIS — Z12.4 PAP SMEAR FOR CERVICAL CANCER SCREENING: ICD-10-CM

## 2019-01-31 DIAGNOSIS — Z00.00 PREVENTATIVE HEALTH CARE: ICD-10-CM

## 2019-01-31 DIAGNOSIS — E78.00 HYPERCHOLESTEROLEMIA: ICD-10-CM

## 2019-01-31 DIAGNOSIS — K21.00 GASTROESOPHAGEAL REFLUX DISEASE WITH ESOPHAGITIS: ICD-10-CM

## 2019-01-31 DIAGNOSIS — Z12.39 SCREENING FOR BREAST CANCER: ICD-10-CM

## 2019-01-31 DIAGNOSIS — Z00.00 PREVENTATIVE HEALTH CARE: Primary | ICD-10-CM

## 2019-01-31 DIAGNOSIS — Z11.59 NEED FOR HEPATITIS C SCREENING TEST: ICD-10-CM

## 2019-01-31 DIAGNOSIS — Z12.11 COLON CANCER SCREENING: ICD-10-CM

## 2019-01-31 LAB
ALBUMIN SERPL BCP-MCNC: 3.6 G/DL
ALP SERPL-CCNC: 107 U/L
ALT SERPL W/O P-5'-P-CCNC: 17 U/L
ANION GAP SERPL CALC-SCNC: 11 MMOL/L
AST SERPL-CCNC: 22 U/L
BASOPHILS # BLD AUTO: 0.08 K/UL
BASOPHILS NFR BLD: 1.1 %
BILIRUB SERPL-MCNC: 0.4 MG/DL
BUN SERPL-MCNC: 12 MG/DL
CALCIUM SERPL-MCNC: 10.5 MG/DL
CHLORIDE SERPL-SCNC: 102 MMOL/L
CHOLEST SERPL-MCNC: 243 MG/DL
CHOLEST/HDLC SERPL: 4.1 {RATIO}
CO2 SERPL-SCNC: 26 MMOL/L
CREAT SERPL-MCNC: 1 MG/DL
DIFFERENTIAL METHOD: ABNORMAL
EOSINOPHIL # BLD AUTO: 1.5 K/UL
EOSINOPHIL NFR BLD: 19.3 %
ERYTHROCYTE [DISTWIDTH] IN BLOOD BY AUTOMATED COUNT: 13.6 %
EST. GFR  (AFRICAN AMERICAN): >60 ML/MIN/1.73 M^2
EST. GFR  (NON AFRICAN AMERICAN): >60 ML/MIN/1.73 M^2
ESTIMATED AVG GLUCOSE: 111 MG/DL
GLUCOSE SERPL-MCNC: 86 MG/DL
HBA1C MFR BLD HPLC: 5.5 %
HCT VFR BLD AUTO: 38.7 %
HDLC SERPL-MCNC: 59 MG/DL
HDLC SERPL: 24.3 %
HGB BLD-MCNC: 12.5 G/DL
LDLC SERPL CALC-MCNC: 161.6 MG/DL
LYMPHOCYTES # BLD AUTO: 2.8 K/UL
LYMPHOCYTES NFR BLD: 36.4 %
MCH RBC QN AUTO: 29 PG
MCHC RBC AUTO-ENTMCNC: 32.3 G/DL
MCV RBC AUTO: 90 FL
MONOCYTES # BLD AUTO: 0.5 K/UL
MONOCYTES NFR BLD: 6.2 %
NEUTROPHILS # BLD AUTO: 2.8 K/UL
NEUTROPHILS NFR BLD: 36.9 %
NONHDLC SERPL-MCNC: 184 MG/DL
PLATELET # BLD AUTO: 329 K/UL
PMV BLD AUTO: 9.3 FL
POTASSIUM SERPL-SCNC: 4 MMOL/L
PROT SERPL-MCNC: 7.3 G/DL
RBC # BLD AUTO: 4.31 M/UL
SODIUM SERPL-SCNC: 139 MMOL/L
TRIGL SERPL-MCNC: 112 MG/DL
TSH SERPL DL<=0.005 MIU/L-ACNC: 2.12 UIU/ML
WBC # BLD AUTO: 7.58 K/UL

## 2019-01-31 PROCEDURE — 36415 COLL VENOUS BLD VENIPUNCTURE: CPT

## 2019-01-31 PROCEDURE — 99396 PREV VISIT EST AGE 40-64: CPT | Mod: S$GLB,,, | Performed by: FAMILY MEDICINE

## 2019-01-31 PROCEDURE — 84443 ASSAY THYROID STIM HORMONE: CPT

## 2019-01-31 PROCEDURE — 80061 LIPID PANEL: CPT

## 2019-01-31 PROCEDURE — 99999 PR PBB SHADOW E&M-EST. PATIENT-LVL IV: CPT | Mod: PBBFAC,,, | Performed by: FAMILY MEDICINE

## 2019-01-31 PROCEDURE — 80053 COMPREHEN METABOLIC PANEL: CPT

## 2019-01-31 PROCEDURE — 99999 PR PBB SHADOW E&M-EST. PATIENT-LVL IV: ICD-10-PCS | Mod: PBBFAC,,, | Performed by: FAMILY MEDICINE

## 2019-01-31 PROCEDURE — 85025 COMPLETE CBC W/AUTO DIFF WBC: CPT

## 2019-01-31 PROCEDURE — 99396 PR PREVENTIVE VISIT,EST,40-64: ICD-10-PCS | Mod: S$GLB,,, | Performed by: FAMILY MEDICINE

## 2019-01-31 PROCEDURE — 86803 HEPATITIS C AB TEST: CPT

## 2019-01-31 PROCEDURE — 83036 HEMOGLOBIN GLYCOSYLATED A1C: CPT

## 2019-01-31 RX ORDER — ATORVASTATIN CALCIUM 20 MG/1
20 TABLET, FILM COATED ORAL DAILY
Qty: 90 TABLET | Refills: 3 | Status: SHIPPED | OUTPATIENT
Start: 2019-01-31 | End: 2019-05-23 | Stop reason: SDUPTHER

## 2019-01-31 NOTE — PROGRESS NOTES
"CHIEF COMPLAINT:  Annual    HISTORY OF PRESENT ILLNESS: The patient presents with greater than a year of reflux symptoms now accompanied by soft a GI this symptoms.  PPI and H2 blockers no longer effective.  I see that she is overdue for her colonoscopy.  We are going to have her see the GI Clinic and get both an EGD and lower endoscopy in the near future.    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    REVIEW OF SYSTEMS:  GENERAL: No fever, chills, fatigability or weight loss.  SKIN: No rashes, itching or changes in color or texture of skin.  HEAD: No headaches or recent head trauma.  EYES: Visual acuity fine. No photophobia, ocular pain or diplopia.  EARS: Denies ear pain, discharge or vertigo.  NOSE: No loss of smell, no epistaxis or postnasal drip.  MOUTH & THROAT: No hoarseness or change in voice. No excessive gum bleeding.  NODES: Denies swollen glands.  CHEST: Denies LARES, cyanosis, wheezing, cough and sputum production.  CARDIOVASCULAR: Denies chest pain, PND, orthopnea or reduced exercise tolerance.  ABDOMEN: Appetite fine. No weight loss. Denies diarrhea, hematemesis or blood in stool.  URINARY: No hematuria.  PERIPHERAL VASCULAR: No claudication or cyanosis.  MUSCULOSKELETAL: No joint stiffness or swelling.   NEUROLOGIC: No history of seizures, paralysis, alteration of gait or coordination.    PHYSICAL EXAMINATION:     /80   Pulse 63   Ht 5' 4" (1.626 m)   Wt 78.8 kg (173 lb 11.6 oz)   LMP 10/12/2012   SpO2 96%   BMI 29.82 kg/m²     APPEARANCE: Well nourished, well developed, in no acute distress.    HEAD: Normocephalic, atraumatic.  EYES: PERRL. EOMI.  Conjunctivae without injection and  anicteric  EARS: TM's intact. Light reflex normal. No retraction or perforation.    NOSE: Mucosa pink. Airway clear.  MOUTH & THROAT: No tonsillar enlargement. No pharyngeal erythema or exudate. No stridor.  NECK: Supple.   NODES: No cervical, axillary " or inguinal lymph node enlargement.  ABDOMEN: Bowel sounds normal. Not distended. Soft. No tenderness or masses.  No ascites is noted.  MUSCULOSKELETAL:  There is no clubbing, cyanosis, or edema of the extremities x4.  There is full range of motion of the lumbar spine.  There is full range of motion of the extremities x4.  There is no deformity noted.    NEUROLOGIC:       Normal speech development.      Hearing normal.      Normal gait.      Motor and sensory exams grossly normal.      DTR's normal.  PSYCHIATRIC: Patient is alert and oriented x3.  Thought processes are all normal.  There is no homicidality.  There is no suicidality.  There is no evidence of psychosis.    LABORATORY/RADIOLOGY: Chart reviewed.    ASSESSMENT:   Annual  GERD with esophagitis  Needs colonoscopy    PLAN:  We will follow-up blood work which we expect to be normal.  GI referral  Return to clinic in 1 year

## 2019-02-01 LAB — HCV AB SERPL QL IA: NEGATIVE

## 2019-02-04 ENCOUNTER — TELEPHONE (OUTPATIENT)
Dept: INTERNAL MEDICINE | Facility: CLINIC | Age: 54
End: 2019-02-04

## 2019-02-04 NOTE — TELEPHONE ENCOUNTER
Pt wife inform that her and  bw looks good no changes in med and f/u as schedule.Pt wife agrees and will let  know.

## 2019-02-04 NOTE — TELEPHONE ENCOUNTER
----- Message from Thomas Khan MD sent at 2/1/2019  5:05 PM CST -----  Blood work looks good overall.  No changes in medications.  Followup as scheduled in 6 months.

## 2019-02-08 DIAGNOSIS — Z12.11 COLON CANCER SCREENING: ICD-10-CM

## 2019-02-21 DIAGNOSIS — Z12.11 SPECIAL SCREENING FOR MALIGNANT NEOPLASMS, COLON: ICD-10-CM

## 2019-02-21 DIAGNOSIS — Z80.0 FAMILY HISTORY OF COLON CANCER: ICD-10-CM

## 2019-02-21 DIAGNOSIS — R10.13 ABDOMINAL PAIN, EPIGASTRIC: Primary | ICD-10-CM

## 2019-02-27 ENCOUNTER — HOSPITAL ENCOUNTER (OUTPATIENT)
Dept: RADIOLOGY | Facility: HOSPITAL | Age: 54
Discharge: HOME | End: 2019-02-27
Attending: FAMILY MEDICINE
Payer: COMMERCIAL

## 2019-02-27 ENCOUNTER — HOSPITAL ENCOUNTER (OUTPATIENT)
Dept: RADIOLOGY | Facility: HOSPITAL | Age: 54
Discharge: HOME | End: 2019-02-27
Attending: INTERNAL MEDICINE
Payer: COMMERCIAL

## 2019-02-27 DIAGNOSIS — Z12.39 SCREENING FOR BREAST CANCER: ICD-10-CM

## 2019-02-27 DIAGNOSIS — R10.13 ABDOMINAL PAIN, EPIGASTRIC: ICD-10-CM

## 2019-02-27 DIAGNOSIS — Z12.11 SPECIAL SCREENING FOR MALIGNANT NEOPLASMS, COLON: ICD-10-CM

## 2019-02-27 DIAGNOSIS — Z80.0 FAMILY HISTORY OF COLON CANCER: ICD-10-CM

## 2019-02-27 PROCEDURE — 77063 BREAST TOMOSYNTHESIS BI: CPT | Mod: 26,,, | Performed by: RADIOLOGY

## 2019-02-27 PROCEDURE — 77067 SCR MAMMO BI INCL CAD: CPT | Mod: TC

## 2019-02-27 PROCEDURE — 77067 MAMMO DIGITAL SCREENING BILAT WITH TOMOSYNTHESIS_CAD: ICD-10-PCS | Mod: 26,,, | Performed by: RADIOLOGY

## 2019-02-27 PROCEDURE — 77067 SCR MAMMO BI INCL CAD: CPT | Mod: 26,,, | Performed by: RADIOLOGY

## 2019-02-27 PROCEDURE — 77063 MAMMO DIGITAL SCREENING BILAT WITH TOMOSYNTHESIS_CAD: ICD-10-PCS | Mod: 26,,, | Performed by: RADIOLOGY

## 2019-02-27 PROCEDURE — 76700 US EXAM ABDOM COMPLETE: CPT | Mod: TC

## 2019-02-27 PROCEDURE — 76700 US EXAM ABDOM COMPLETE: CPT | Mod: 26,,, | Performed by: RADIOLOGY

## 2019-02-27 PROCEDURE — 76700 US ABDOMEN COMPLETE: ICD-10-PCS | Mod: 26,,, | Performed by: RADIOLOGY

## 2019-03-15 ENCOUNTER — TELEPHONE (OUTPATIENT)
Dept: ADMINISTRATIVE | Facility: HOSPITAL | Age: 54
End: 2019-03-15

## 2019-04-04 ENCOUNTER — TELEPHONE (OUTPATIENT)
Dept: ADMINISTRATIVE | Facility: HOSPITAL | Age: 54
End: 2019-04-04

## 2019-04-04 NOTE — TELEPHONE ENCOUNTER
[facility] colonoscopy pathology report scanned into media.    Radha DELCID LPN  Clinical Care Coordinator  Internal Medicine  [facility]

## 2019-04-16 ENCOUNTER — HOSPITAL ENCOUNTER (OUTPATIENT)
Dept: RADIOLOGY | Facility: OTHER | Age: 54
Discharge: HOME | End: 2019-04-16
Attending: INTERNAL MEDICINE
Payer: COMMERCIAL

## 2019-04-16 DIAGNOSIS — R10.13 ABDOMINAL PAIN, EPIGASTRIC: ICD-10-CM

## 2019-04-16 PROCEDURE — A9537 TC99M MEBROFENIN: HCPCS

## 2019-04-16 PROCEDURE — 78227 HEPATOBIL SYST IMAGE W/DRUG: CPT | Mod: 26,,, | Performed by: RADIOLOGY

## 2019-04-16 PROCEDURE — 78227 NM HEPATOBILIARY(HIDA) WITH PHARM AND EF: ICD-10-PCS | Mod: 26,,, | Performed by: RADIOLOGY

## 2019-04-19 DIAGNOSIS — R00.2 HEART PALPITATIONS: ICD-10-CM

## 2019-04-19 DIAGNOSIS — G43.109 MIGRAINE WITH AURA AND WITHOUT STATUS MIGRAINOSUS, NOT INTRACTABLE: ICD-10-CM

## 2019-04-22 RX ORDER — PROPRANOLOL HYDROCHLORIDE 40 MG/1
TABLET ORAL
Qty: 180 TABLET | Refills: 0 | OUTPATIENT
Start: 2019-04-22

## 2019-04-29 ENCOUNTER — TELEPHONE (OUTPATIENT)
Dept: CARDIOLOGY | Facility: CLINIC | Age: 54
End: 2019-04-29

## 2019-04-29 NOTE — TELEPHONE ENCOUNTER
----- Message from Tami Clemente sent at 4/29/2019 10:05 AM CDT -----  Contact: Patient  Type:  RX Refill Request    Who Called:  Patient  Refill or New Rx:  Refill  RX Name and Strength:  propranolol (INDERAL) 40 MG tablet  How is the patient currently taking it? (ex. 1XDay):  2xday  Is this a 30 day or 90 day RX:  90 days  Preferred Pharmacy with phone number:    [facility] in [place]    Local or Mail Order:  local  Ordering Provider:  Fan  Best Call Back Number:

## 2019-04-29 NOTE — TELEPHONE ENCOUNTER
Call placed to Ms. Chinchilla in regards to message received. I advised her I needed to make her an appt prior to her getting a refill since she has not been seen in the office sine 11/2017. She verbalized understanding. I offered her an appt with Roxie Lindo NP Wednesday 5/1/19 @ 1pm. She accepted. No further issues noted.

## 2019-04-30 DIAGNOSIS — Z76.89 ENCOUNTER TO ESTABLISH CARE: Primary | ICD-10-CM

## 2019-05-07 ENCOUNTER — OFFICE VISIT (OUTPATIENT)
Dept: CARDIOLOGY | Facility: CLINIC | Age: 54
End: 2019-05-07
Payer: COMMERCIAL

## 2019-05-07 DIAGNOSIS — E78.00 HYPERCHOLESTEROLEMIA: ICD-10-CM

## 2019-05-07 DIAGNOSIS — R00.2 HEART PALPITATIONS: ICD-10-CM

## 2019-05-07 DIAGNOSIS — D72.10 EOSINOPHILIA: ICD-10-CM

## 2019-05-07 DIAGNOSIS — R07.9 CHEST PAIN, UNSPECIFIED TYPE: ICD-10-CM

## 2019-05-07 DIAGNOSIS — R94.31 NONSPECIFIC ABNORMAL ELECTROCARDIOGRAM (ECG): ICD-10-CM

## 2019-05-07 DIAGNOSIS — E65 ABDOMINAL OBESITY: ICD-10-CM

## 2019-05-07 DIAGNOSIS — Q21.10 ASD (ATRIAL SEPTAL DEFECT): ICD-10-CM

## 2019-05-07 DIAGNOSIS — I63.412 CEREBROVASCULAR ACCIDENT (CVA) DUE TO EMBOLISM OF LEFT MIDDLE CEREBRAL ARTERY: ICD-10-CM

## 2019-05-07 DIAGNOSIS — Z91.89 CARDIOVASCULAR RISK FACTOR: Primary | ICD-10-CM

## 2019-05-07 DIAGNOSIS — R06.09 DOE (DYSPNEA ON EXERTION): ICD-10-CM

## 2019-05-07 DIAGNOSIS — G43.009 MIGRAINE WITHOUT AURA AND WITHOUT STATUS MIGRAINOSUS, NOT INTRACTABLE: ICD-10-CM

## 2019-05-07 PROCEDURE — 99999 PR PBB SHADOW E&M-EST. PATIENT-LVL I: CPT | Mod: PBBFAC,,, | Performed by: INTERNAL MEDICINE

## 2019-05-07 PROCEDURE — 93000 EKG 12-LEAD: ICD-10-PCS | Mod: S$GLB,,, | Performed by: INTERNAL MEDICINE

## 2019-05-07 PROCEDURE — 99999 PR PBB SHADOW E&M-EST. PATIENT-LVL I: ICD-10-PCS | Mod: PBBFAC,,, | Performed by: INTERNAL MEDICINE

## 2019-05-07 PROCEDURE — 99215 OFFICE O/P EST HI 40 MIN: CPT | Mod: S$GLB,,, | Performed by: INTERNAL MEDICINE

## 2019-05-07 PROCEDURE — 93000 ELECTROCARDIOGRAM COMPLETE: CPT | Mod: S$GLB,,, | Performed by: INTERNAL MEDICINE

## 2019-05-07 PROCEDURE — 99215 PR OFFICE/OUTPT VISIT, EST, LEVL V, 40-54 MIN: ICD-10-PCS | Mod: S$GLB,,, | Performed by: INTERNAL MEDICINE

## 2019-05-07 RX ORDER — PANTOPRAZOLE SODIUM 40 MG/1
40 TABLET, DELAYED RELEASE ORAL DAILY
Refills: 3 | COMMUNITY
Start: 2019-03-14 | End: 2020-11-11 | Stop reason: SDUPTHER

## 2019-05-07 RX ORDER — PROPRANOLOL HYDROCHLORIDE 40 MG/1
40 TABLET ORAL 2 TIMES DAILY
Qty: 180 TABLET | Refills: 3 | Status: ON HOLD | OUTPATIENT
Start: 2019-05-07 | End: 2019-05-22 | Stop reason: HOSPADM

## 2019-05-07 RX ORDER — NAPROXEN SODIUM 220 MG/1
81 TABLET, FILM COATED ORAL DAILY
Qty: 50 TABLET | Refills: 3 | Status: ON HOLD
Start: 2019-05-07 | End: 2023-01-09 | Stop reason: SDUPTHER

## 2019-05-07 RX ORDER — HYOSCYAMINE SULFATE 0.12 MG/1
1 TABLET SUBLINGUAL EVERY 6 HOURS PRN
Refills: 2 | COMMUNITY
Start: 2019-03-29 | End: 2021-10-06

## 2019-05-07 NOTE — PROGRESS NOTES
Subjective:    Patient ID:  Renée Goff is a 53 y.o. female who presents for evaluation of Annual Exam  For medication renewal, prior ASD with CVA, palpitations, right-sided weakness with memory loss in 1/2017, LARES with making bed  PCP: Dr. Khan, [facility]   Prior Cardiologist: Dr. Jenkins  ENT: Dr. Mensah  Neurologist: Dr. Henderson, now Dr. 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    Health literacy: high  Activities: gym twice weekly for an hour to 90 minutes, face turn red, noted some LARES making the bed from 1/2019  Nicotine: never  Alcohol: none  Illicit drugs: none  Cardiac symptoms: LARES  Home BP: do not check  Medication compliance: yes  Diet: regular  Caffeine: 6 cpd, no sleep problem  Labs: 1/2019, .6, not on Rx, normal CMP, CBC (eosinophil 19.3%), TSH, A1C 5.5%  Last Echo: 1/2017  Last stress test: 1/2017  Cardiovascular angiogram: none  ECG: NSR, 98, PVC, NSSTTA, QTc 469 msec  Fundoscopic exam: within the past year, negative for HTN changes    In 11/2012:  HPI Comments: 47 y.o. female admitted to Hospitalist Service from [facility] Room with complaint of chest pain 1998, history of CVA 1996 and history of migrain headache. Patient d, she was in her usual state of health, suddenly started experiencing substernal chest Pain, non-radiating, felt SOB. CP was constant. Chest pain was moderate in intensity.  No recent angina like complaints. Last cardiac stress test was in 1998. Patient was admitted to Hospitalist medicine service. Patient was evaluated by Dr. Chavira. Serial cardiac enzymes were negative.  Patient underwent Lexiscan which was negative for ischemia. During hospital stay, patient had a migrain headache which was medically managed. Patient was discharged home in stable condition with following discharge plan of care.     Cardiac evaluation,  11/2012:  ECHO CONCLUSIONS                                                                  1 - Mild left ventricular enlargement.                                       2 - Low normal left ventricular function (EF 51%).                           3 - Normal diastolic function.                                               4 - Right ventricle is upper limit of normal in size with normal             systolic function    Lexiscan:  Nuclear Quantitative Functional Analysis:                                    LVEF: 53 % (normal is 55 - 69)                                               LVED Volume: 105 ml (normal is 60 - 98)                                      LVES Volume: 49 ml (normal is 20 - 42)                                                                                                                    Impression: NORMAL MYOCARDIAL PERFUSION                                      1. The perfusion scan is free of evidence for myocardial ischemia or         injury.                                                                      2. There is a moderate intensity fixed defect in the anteroapical wall of    the left ventricle, secondary to breast attenuation.                         3. Resting wall motion is physiologic.                                       4. There is resting LV dysfunction with a reduced ejection fraction of 53    %.  (normal is 55 - 69)                                                      5. The ventricular volumes are normal at rest and stress.                    6. The extracardiac distribution of radioactivity is normal.    Since visit of 12/3/2012, no new problem, improve situation at home, caring for 3 grandson, bike twice a week for about 30 minutes with occasional walk, no regular exercise routine. Still working part-time. Denies any symptoms. EKG today NSR, rate of 70 with anterior T waves inversion.    Since visit of 1/27/2014, began experiencing sudden onset of vertigo after Maryanne,  "accompanied by nausea and unable to stand nor walk. Can last up to a few days, usually a day and a half. No fall. No problem with home medications. Migraine not a problem. 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 ECG continue to show anterior T-waves inversion. No CP unless out of propranolol. Last lipid in record, done 11/2012, showed LDL-C 119. ASCVD 10-year risk is only 0.74%, very, very very low.    In 10/2016, no new problem, less stress, now caring for grandchildren, active walking with stroller every other day and biking every other day. Noted some increase chest pounding this month, last up to 20 minutes, feels real anxious, no additional medication. Some SOB. Migraines are less, may be once every other week but can last up to 2 days. Mother passed in 11/2015, age 72.     In 11/2017, here for annual reviewed, had sudden onset of right-sided weakness with memory loss in 1/2017, hospitalized for 2 days, was on full dose ASA at the time.  DCS - "Hospital Course:   Patient is a 51 y.o. female admitted to Hospitalist Service from [facility] Room with complaint of chest pain for 1 day. Patient reportedly has past medical history significant for hypertension, CAD, history of AMI and CVA. Patient reported chest pain started last night which was radiating to arms and upper back. Patient has also been experiencing headache. No head injury, LOC or seizure activity reported. Patient denied any focal deficits. No associated nausea or diaphorsis reported. No leg swelling of calf tenderness. Patient denied shortness of breath, abdominal pain, nausea, vomiting, headache, vision changes, focal neuro-deficits, cough or fever. Patient was admitted to Hospitalist medicine service. Patient was evaluated by Dr. Zamora and Dr. Chavira. Patient was monitored on telemetry medical floor, serial cardiac enzymes remained negative. Patient was evaluated by cardiologist and had further cardiac workup as mentioned " "below, which was negative for acute ischemia. Patient's symptoms resolved. MRI brain did not confirm CVA. Neurological symptoms resolved. Patient encouraged to follow up closely and have amnesia work up completed. Patient was discharged home in stable condition with following discharge plan of care.      Consults: Dr. Zamora and Dr. Fan Montes:  1. The perfusion scan is free of evidence for myocardial ischemia or injury.   2. There is abnormal wall motion at rest showing moderate hypokinesis of the septal wall of the left ventricle.   3. Resting LV function is normal.  (normal is 55 - 69)  4. The ventricular volumes are normal at rest and stress.   5. The extracardiac distribution of radioactivity is normal.   6. There is evidence of right ventricular hypertrophy.   7. When compared to the previous study from 11/13/2012, the RV appears more prominent on current study.     MRI Brain: Negative MRI of the brain.  Chronic right maxillary and ethmoid sinusitis.  Carotid US: Significant atherosclerotic plaque or calcification is not identified in the carotid arteries by real-time ultrasound or NASCET criteria      ECHO:     1 - Low normal left ventricular systolic function (EF 50-55%).     2 - Normal left ventricular diastolic function.     3 - Right ventricle is upper limit of normal in size with normal systolic function.     4 - The estimated PA systolic pressure is greater than 31 mmHg.     5 - Difficult windows, even with the use of Optison contrast.  Unable to adequately assess for discrete wall motion abnormalities .     6 - Compared to echo from 11/13/2012, no significant change."     Lipid .4, ASCVD 10-year event risk only 1.7%, was placed on atorvastatin 20 mg, no repeat lipid obtained. For the past month noted recurrent right sided CP, like a pressure radiating to the back, last up to half an hour, intensity rated 6/10, no associated symptoms, able to return to sleep. No known inciting factor, don't feel " stress. Active with walking daily for half an hour, no problem excepted noted some heart racing. ECG is essentially normal , rate 72, non-specific STA. Compliant with medications, never smoked, very occasional alcohol. Uses Mobic very rarely for pain in the shoulder.     HPI comments: in 5/2019, here for annual review and medication refill. No heart worries, noted some SOB when making the bed. Gym exercise no problem. Labs indicates eosinophilia suspect some form allergies. Do report occasional wheezing and cough.    ROS     Constitutional: negative for chills, fatigue, fevers, sweats, loss 5 lbs since 11/2017  HENT: some sinus congestion, with hoarseness and sore throat.  Eyes: negative for icterus, redness and visual disturbance  Respiratory: negative for asthma, cough, dyspnea on exertion, hemoptysis, pleurisy/chest pain and wheezing, [place] score 2 today 3  Cardiovascular: no chest pain, no chest pressure/discomfort, dyspnea, near-syncope, positive for palpitations and LARES, no paroxysmal nocturnal dyspnea and syncope  Gastrointestinal: positive for abdominal pain, no nausea and vomiting  Musculoskeletal: negative for arthralgias, muscle weakness and myalgias, some back pain with bending, moving  Neurological: negative for coordination problems, gait problems, tremors, no further dizziness and vertigo, positive for headaches / migraine with nausea, and paresthesia, occasional migraine HA, can last up to 2 days. Some difficulties in concentration and coordination, no fall but do lose balance easily.  Psych: no depression or anxiety, no further memory loss, some anxiety    Objective:    Physical Exam     General appearance: alert, appears stated age, cooperative and no distress  Head: Normocephalic, without obvious abnormality, atraumatic  Eyes:  conjunctivae/corneas clear. PERRL, EOM's intact.   Neck: no adenopathy, no carotid bruit, no JVD, supple, symmetrical, trachea midline and thyroid not enlarged,  "symmetric, no tenderness/mass/nodules  Lungs:  clear to auscultation bilaterally  Chest wall: no further tenderness  Heart: regular rate and rhythm, S1, S2 normal, no murmur, click, rub or gallop   Abdomen: soft, non-tender; bowel sounds normal; no masses,  no organomegaly, waist circumference 35.25" increased to 37.5", hip 41"  Extremities: extremities normal, atraumatic, no cyanosis or edema  Pulses: 2+ and symmetric    Assessment:       1. Cardiovascular risk factor, ASCVD 10-year risk 0.7%, 2012, 1.7% in 2017, 2.2% in 2018    2. Chest pain, unspecified type    3. ASD (atrial septal defect), closed 12/1996    4. Cerebrovascular accident (CVA) due to embolism of left middle cerebral artery. 1996    5. Hypercholesterolemia, baseline .6    6. LARES (dyspnea on exertion), onset 1/2019    7. Migraine without aura and without status migrainosus, not intractable    8. Eosinophilia    9. Nonspecific abnormal electrocardiogram (ECG)    10. Heart palpitations    11. Abdominal obesity         Plan:       Renée MORENO was seen today for follow-up.    Diagnoses and associated orders for this visit:    Cardiovascular risk factor, ASCVD 10-year risk 0.7%, 2012, 1.7% in 2017, 2.2% in 2018    Chest pain, unspecified type  -     EKG 12-lead    ASD (atrial septal defect), closed 12/1996  -     aspirin 81 MG Chew; Take 1 tablet (81 mg total) by mouth once daily.  Dispense: 50 tablet; Refill: 3    Cerebrovascular accident (CVA) due to embolism of left middle cerebral artery. 1996  -     aspirin 81 MG Chew; Take 1 tablet (81 mg total) by mouth once daily.  Dispense: 50 tablet; Refill: 3    Hypercholesterolemia, baseline .6    LARES (dyspnea on exertion), onset 1/2019    Migraine without aura and without status migrainosus, not intractable  -     propranolol (INDERAL) 40 MG tablet; Take 1 tablet (40 mg total) by mouth 2 (two) times daily.  Dispense: 180 tablet; Refill: 3    Eosinophilia    Nonspecific abnormal electrocardiogram " (ECG)    Heart palpitations  -     propranolol (INDERAL) 40 MG tablet; Take 1 tablet (40 mg total) by mouth 2 (two) times daily.  Dispense: 180 tablet; Refill: 3    Abdominal obesity    - All medical issues reviewed, continue current Rx.  - CV status stable, continue current Rx, all medications reviewed, patient acknowledge good understanding.  - Recommend at least annual cardiovascular evaluation in view of her significant risk factors.  - Recommend Yoga or Arturo Chi  - Have to do some abdominal exercise to rid belly fat  - Instruction for Mediterranean diet and heart healthy exercise given.  - Weigh twice weekly, try to lose 1-2 lbs per week    Stress at home - improved       Patient Active Problem List   Diagnosis    Cerebrovascular accident (CVA) due to embolism of left middle cerebral artery. 1996    ASD (atrial septal defect), closed 12/1996    Nonspecific abnormal electrocardiogram (ECG)    KIRA (generalized anxiety disorder)    Abnormal CT of the head    Perennial sinusitis    Migraine    BMI 29.3 - 29.9, today 30.5    Abdominal obesity    Cardiovascular risk factor, ASCVD 10-year risk 0.7%, 2012, 1.7% in 2017, 2.2% in 2018    Right sided weakness (rule out new CVA), 1/2017    Hypercholesterolemia, baseline .6    LARES (dyspnea on exertion), onset 1/2019    Eosinophilia     Total face-to-face time with the patient was 35 minutes and greater than 50% was spent in counseling and coordination of care. The above assessment and plan have been discussed at length. Labs and procedure over the last 6 months reviewed. Problem List updated. Asked to bring in all active medications / pills bottles with next visit.

## 2019-05-07 NOTE — LETTER
May 7, 2019      Roxie Lindo, FNP  [address]  [facility] [address]           [place] [facility] Card[facility]  [address]  [place] [address]  Phone: [phone]          Patient: Renée Goff   MR Number: [id number]   YOB: 1965   Date of Visit: 5/7/2019       Dear Roxie Lindo:    Thank you for referring Renée Goff to me for evaluation. Attached you will find relevant portions of my assessment and plan of care.    If you have questions, please do not hesitate to call me. I look forward to following Renée Goff along with you.    Sincerely,    Giacomo Chavira MD    Enclosure  CC:  No Recipients    If you would like to receive this communication electronically, please contact [email or web address] or [phone] to request more information on [email or web address] Link access.    For providers and/or their staff who would like to refer a patient to [facility], please contact us through our one-stop-shop provider referral line, [facility], at [phone].    If you feel you have received this communication in error or would no longer like to receive these types of communications, please e-mail [email or web address]

## 2019-05-20 ENCOUNTER — HOSPITAL ENCOUNTER (OUTPATIENT)
Facility: HOSPITAL | Age: 54
Discharge: HOME | End: 2019-05-22
Attending: EMERGENCY MEDICINE | Admitting: HOSPITALIST
Payer: COMMERCIAL

## 2019-05-20 ENCOUNTER — CLINICAL SUPPORT (OUTPATIENT)
Dept: URGENT CARE | Facility: CLINIC | Age: 54
End: 2019-05-20
Payer: COMMERCIAL

## 2019-05-20 ENCOUNTER — TELEPHONE (OUTPATIENT)
Dept: INTERNAL MEDICINE | Facility: CLINIC | Age: 54
End: 2019-05-20

## 2019-05-20 VITALS
BODY MASS INDEX: 29.88 KG/M2 | SYSTOLIC BLOOD PRESSURE: 113 MMHG | HEART RATE: 86 BPM | DIASTOLIC BLOOD PRESSURE: 82 MMHG | WEIGHT: 175 LBS | HEIGHT: 64 IN | OXYGEN SATURATION: 97 % | RESPIRATION RATE: 18 BRPM | TEMPERATURE: 101 F

## 2019-05-20 DIAGNOSIS — R06.82 TACHYPNEA: ICD-10-CM

## 2019-05-20 DIAGNOSIS — J20.9 ACUTE BRONCHITIS, UNSPECIFIED ORGANISM: ICD-10-CM

## 2019-05-20 DIAGNOSIS — R06.02 SHORTNESS OF BREATH: ICD-10-CM

## 2019-05-20 DIAGNOSIS — R06.2 WHEEZING: Primary | ICD-10-CM

## 2019-05-20 DIAGNOSIS — D72.10 EOSINOPHILIA: ICD-10-CM

## 2019-05-20 DIAGNOSIS — R06.2 BILATERAL WHEEZING: Primary | ICD-10-CM

## 2019-05-20 DIAGNOSIS — J45.51 SEVERE PERSISTENT ASTHMA WITH ACUTE EXACERBATION: ICD-10-CM

## 2019-05-20 DIAGNOSIS — D69.6 THROMBOCYTOPENIA: ICD-10-CM

## 2019-05-20 DIAGNOSIS — R06.02 SOB (SHORTNESS OF BREATH): ICD-10-CM

## 2019-05-20 PROBLEM — R59.0 MEDIASTINAL ADENOPATHY: Status: ACTIVE | Noted: 2019-05-20

## 2019-05-20 LAB
ALBUMIN SERPL BCP-MCNC: 3.7 G/DL (ref 3.5–5.2)
ALP SERPL-CCNC: 111 U/L (ref 55–135)
ALT SERPL W/O P-5'-P-CCNC: 16 U/L (ref 10–44)
ANION GAP SERPL CALC-SCNC: 10 MMOL/L (ref 8–16)
AST SERPL-CCNC: 22 U/L (ref 10–40)
BASOPHILS # BLD AUTO: 0 K/UL (ref 0–0.2)
BASOPHILS NFR BLD: 0.5 % (ref 0–1.9)
BILIRUB SERPL-MCNC: 0.3 MG/DL (ref 0.1–1)
BNP SERPL-MCNC: 92 PG/ML (ref 0–99)
BUN SERPL-MCNC: 10 MG/DL (ref 6–20)
CALCIUM SERPL-MCNC: 9.9 MG/DL (ref 8.7–10.5)
CHLORIDE SERPL-SCNC: 105 MMOL/L (ref 95–110)
CO2 SERPL-SCNC: 26 MMOL/L (ref 23–29)
CREAT SERPL-MCNC: 0.8 MG/DL (ref 0.5–1.4)
D DIMER PPP IA.FEU-MCNC: 0.67 MG/L FEU
DIFFERENTIAL METHOD: ABNORMAL
EOSINOPHIL # BLD AUTO: 1.7 K/UL (ref 0–0.5)
EOSINOPHIL NFR BLD: 18.5 % (ref 0–8)
ERYTHROCYTE [DISTWIDTH] IN BLOOD BY AUTOMATED COUNT: 13.3 % (ref 11.5–14.5)
EST. GFR  (AFRICAN AMERICAN): >60 ML/MIN/1.73 M^2
EST. GFR  (NON AFRICAN AMERICAN): >60 ML/MIN/1.73 M^2
GLUCOSE SERPL-MCNC: 114 MG/DL (ref 70–110)
HCT VFR BLD AUTO: 36.4 % (ref 37–48.5)
HGB BLD-MCNC: 12.1 G/DL (ref 12–16)
LYMPHOCYTES # BLD AUTO: 2.3 K/UL (ref 1–4.8)
LYMPHOCYTES NFR BLD: 24.6 % (ref 18–48)
MCH RBC QN AUTO: 29.5 PG (ref 27–31)
MCHC RBC AUTO-ENTMCNC: 33.4 G/DL (ref 32–36)
MCV RBC AUTO: 88 FL (ref 82–98)
MONOCYTES # BLD AUTO: 0.7 K/UL (ref 0.3–1)
MONOCYTES NFR BLD: 7.6 % (ref 4–15)
NEUTROPHILS # BLD AUTO: 4.6 K/UL (ref 1.8–7.7)
NEUTROPHILS NFR BLD: 48.8 % (ref 38–73)
PLATELET # BLD AUTO: 283 K/UL (ref 150–350)
PMV BLD AUTO: 8 FL (ref 9.2–12.9)
POTASSIUM SERPL-SCNC: 4.4 MMOL/L (ref 3.5–5.1)
PROT SERPL-MCNC: 7 G/DL (ref 6–8.4)
RBC # BLD AUTO: 4.12 M/UL (ref 4–5.4)
SODIUM SERPL-SCNC: 141 MMOL/L (ref 136–145)
TROPONIN I SERPL DL<=0.01 NG/ML-MCNC: <0.006 NG/ML (ref 0–0.03)
WBC # BLD AUTO: 9.5 K/UL (ref 3.9–12.7)

## 2019-05-20 PROCEDURE — 85379 FIBRIN DEGRADATION QUANT: CPT

## 2019-05-20 PROCEDURE — 96361 HYDRATE IV INFUSION ADD-ON: CPT

## 2019-05-20 PROCEDURE — 99285 EMERGENCY DEPT VISIT HI MDM: CPT | Mod: 25

## 2019-05-20 PROCEDURE — 94761 N-INVAS EAR/PLS OXIMETRY MLT: CPT

## 2019-05-20 PROCEDURE — 93005 ELECTROCARDIOGRAM TRACING: CPT

## 2019-05-20 PROCEDURE — 27000221 HC OXYGEN, UP TO 24 HOURS

## 2019-05-20 PROCEDURE — 83880 ASSAY OF NATRIURETIC PEPTIDE: CPT

## 2019-05-20 PROCEDURE — 84484 ASSAY OF TROPONIN QUANT: CPT

## 2019-05-20 PROCEDURE — 85025 COMPLETE CBC W/AUTO DIFF WBC: CPT

## 2019-05-20 PROCEDURE — 25000242 PHARM REV CODE 250 ALT 637 W/ HCPCS: Performed by: EMERGENCY MEDICINE

## 2019-05-20 PROCEDURE — 94640 PR INHAL RX, AIRWAY OBST/DX SPUTUM INDUCT: ICD-10-PCS | Mod: S$GLB,,, | Performed by: NURSE PRACTITIONER

## 2019-05-20 PROCEDURE — G0378 HOSPITAL OBSERVATION PER HR: HCPCS

## 2019-05-20 PROCEDURE — 94640 AIRWAY INHALATION TREATMENT: CPT | Mod: S$GLB,,, | Performed by: NURSE PRACTITIONER

## 2019-05-20 PROCEDURE — 71046 X-RAY EXAM CHEST 2 VIEWS: CPT | Mod: 77,S$GLB,, | Performed by: NURSE PRACTITIONER

## 2019-05-20 PROCEDURE — 25000242 PHARM REV CODE 250 ALT 637 W/ HCPCS: Performed by: NURSE PRACTITIONER

## 2019-05-20 PROCEDURE — 25500020 PHARM REV CODE 255: Performed by: EMERGENCY MEDICINE

## 2019-05-20 PROCEDURE — 96375 TX/PRO/DX INJ NEW DRUG ADDON: CPT

## 2019-05-20 PROCEDURE — 99204 OFFICE O/P NEW MOD 45 MIN: CPT | Mod: 25,S$GLB,, | Performed by: NURSE PRACTITIONER

## 2019-05-20 PROCEDURE — 25000242 PHARM REV CODE 250 ALT 637 W/ HCPCS: Performed by: HOSPITALIST

## 2019-05-20 PROCEDURE — 96374 THER/PROPH/DIAG INJ IV PUSH: CPT

## 2019-05-20 PROCEDURE — 36415 COLL VENOUS BLD VENIPUNCTURE: CPT

## 2019-05-20 PROCEDURE — 71046 PR XRAY, CHEST, 2 VIEWS: ICD-10-PCS | Mod: 77,S$GLB,, | Performed by: NURSE PRACTITIONER

## 2019-05-20 PROCEDURE — 80053 COMPREHEN METABOLIC PANEL: CPT

## 2019-05-20 PROCEDURE — 94640 AIRWAY INHALATION TREATMENT: CPT

## 2019-05-20 PROCEDURE — 63600175 PHARM REV CODE 636 W HCPCS: Performed by: NURSE PRACTITIONER

## 2019-05-20 PROCEDURE — 99204 PR OFFICE/OUTPT VISIT, NEW, LEVL IV, 45-59 MIN: ICD-10-PCS | Mod: 25,S$GLB,, | Performed by: NURSE PRACTITIONER

## 2019-05-20 PROCEDURE — 63600175 PHARM REV CODE 636 W HCPCS: Performed by: EMERGENCY MEDICINE

## 2019-05-20 PROCEDURE — 25000003 PHARM REV CODE 250: Performed by: NURSE PRACTITIONER

## 2019-05-20 RX ORDER — IPRATROPIUM BROMIDE AND ALBUTEROL SULFATE 2.5; .5 MG/3ML; MG/3ML
3 SOLUTION RESPIRATORY (INHALATION) EVERY 4 HOURS PRN
Status: DISCONTINUED | OUTPATIENT
Start: 2019-05-20 | End: 2019-05-22 | Stop reason: HOSPADM

## 2019-05-20 RX ORDER — POTASSIUM CHLORIDE 20 MEQ/15ML
60 SOLUTION ORAL
Status: DISCONTINUED | OUTPATIENT
Start: 2019-05-20 | End: 2019-05-22 | Stop reason: HOSPADM

## 2019-05-20 RX ORDER — RAMELTEON 8 MG/1
8 TABLET ORAL NIGHTLY PRN
Status: DISCONTINUED | OUTPATIENT
Start: 2019-05-20 | End: 2019-05-22 | Stop reason: HOSPADM

## 2019-05-20 RX ORDER — SODIUM CHLORIDE 9 MG/ML
INJECTION, SOLUTION INTRAVENOUS CONTINUOUS
Status: DISCONTINUED | OUTPATIENT
Start: 2019-05-20 | End: 2019-05-21

## 2019-05-20 RX ORDER — IPRATROPIUM BROMIDE AND ALBUTEROL SULFATE 2.5; .5 MG/3ML; MG/3ML
3 SOLUTION RESPIRATORY (INHALATION) EVERY 6 HOURS
Status: DISCONTINUED | OUTPATIENT
Start: 2019-05-21 | End: 2019-05-22

## 2019-05-20 RX ORDER — KETOROLAC TROMETHAMINE 30 MG/ML
15 INJECTION, SOLUTION INTRAMUSCULAR; INTRAVENOUS
Status: COMPLETED | OUTPATIENT
Start: 2019-05-20 | End: 2019-05-20

## 2019-05-20 RX ORDER — ATORVASTATIN CALCIUM 20 MG/1
20 TABLET, FILM COATED ORAL DAILY
Status: DISCONTINUED | OUTPATIENT
Start: 2019-05-21 | End: 2019-05-22 | Stop reason: HOSPADM

## 2019-05-20 RX ORDER — HYOSCYAMINE SULFATE 0.12 MG/1
0.12 TABLET SUBLINGUAL EVERY 6 HOURS PRN
Status: DISCONTINUED | OUTPATIENT
Start: 2019-05-20 | End: 2019-05-22 | Stop reason: HOSPADM

## 2019-05-20 RX ORDER — ACETAMINOPHEN 500 MG
1000 TABLET ORAL EVERY 6 HOURS PRN
Status: DISCONTINUED | OUTPATIENT
Start: 2019-05-20 | End: 2019-05-22 | Stop reason: HOSPADM

## 2019-05-20 RX ORDER — PANTOPRAZOLE SODIUM 40 MG/1
40 TABLET, DELAYED RELEASE ORAL DAILY
Status: DISCONTINUED | OUTPATIENT
Start: 2019-05-21 | End: 2019-05-22 | Stop reason: HOSPADM

## 2019-05-20 RX ORDER — AZITHROMYCIN 250 MG/1
250 TABLET, FILM COATED ORAL DAILY
Status: DISCONTINUED | OUTPATIENT
Start: 2019-05-21 | End: 2019-05-22 | Stop reason: HOSPADM

## 2019-05-20 RX ORDER — METHYLPREDNISOLONE SOD SUCC 125 MG
125 VIAL (EA) INJECTION
Status: COMPLETED | OUTPATIENT
Start: 2019-05-20 | End: 2019-05-20

## 2019-05-20 RX ORDER — ALBUTEROL SULFATE 2.5 MG/.5ML
5 SOLUTION RESPIRATORY (INHALATION) ONCE
Status: COMPLETED | OUTPATIENT
Start: 2019-05-20 | End: 2019-05-20

## 2019-05-20 RX ORDER — PROPRANOLOL HYDROCHLORIDE 20 MG/1
40 TABLET ORAL 2 TIMES DAILY
Status: DISCONTINUED | OUTPATIENT
Start: 2019-05-20 | End: 2019-05-21

## 2019-05-20 RX ORDER — DIPHENHYDRAMINE HYDROCHLORIDE 50 MG/ML
25 INJECTION INTRAMUSCULAR; INTRAVENOUS
Status: COMPLETED | OUTPATIENT
Start: 2019-05-20 | End: 2019-05-20

## 2019-05-20 RX ORDER — GLUCAGON 1 MG
1 KIT INJECTION
Status: DISCONTINUED | OUTPATIENT
Start: 2019-05-20 | End: 2019-05-22 | Stop reason: HOSPADM

## 2019-05-20 RX ORDER — POTASSIUM CHLORIDE 20 MEQ/15ML
40 SOLUTION ORAL
Status: DISCONTINUED | OUTPATIENT
Start: 2019-05-20 | End: 2019-05-22 | Stop reason: HOSPADM

## 2019-05-20 RX ORDER — SODIUM CHLORIDE 0.9 % (FLUSH) 0.9 %
10 SYRINGE (ML) INJECTION
Status: DISCONTINUED | OUTPATIENT
Start: 2019-05-20 | End: 2019-05-22 | Stop reason: HOSPADM

## 2019-05-20 RX ORDER — IBUPROFEN 200 MG
16 TABLET ORAL
Status: DISCONTINUED | OUTPATIENT
Start: 2019-05-20 | End: 2019-05-22 | Stop reason: HOSPADM

## 2019-05-20 RX ORDER — AMITRIPTYLINE HYDROCHLORIDE 10 MG/1
10 TABLET, FILM COATED ORAL 4 TIMES DAILY
Status: DISCONTINUED | OUTPATIENT
Start: 2019-05-20 | End: 2019-05-21

## 2019-05-20 RX ORDER — LANOLIN ALCOHOL/MO/W.PET/CERES
800 CREAM (GRAM) TOPICAL
Status: DISCONTINUED | OUTPATIENT
Start: 2019-05-20 | End: 2019-05-22 | Stop reason: HOSPADM

## 2019-05-20 RX ORDER — ONDANSETRON 2 MG/ML
8 INJECTION INTRAMUSCULAR; INTRAVENOUS EVERY 8 HOURS PRN
Status: DISCONTINUED | OUTPATIENT
Start: 2019-05-20 | End: 2019-05-22 | Stop reason: HOSPADM

## 2019-05-20 RX ORDER — NAPROXEN SODIUM 220 MG/1
81 TABLET, FILM COATED ORAL DAILY
Status: DISCONTINUED | OUTPATIENT
Start: 2019-05-21 | End: 2019-05-22 | Stop reason: HOSPADM

## 2019-05-20 RX ORDER — KETOROLAC TROMETHAMINE 30 MG/ML
15 INJECTION, SOLUTION INTRAMUSCULAR; INTRAVENOUS EVERY 6 HOURS PRN
Status: DISCONTINUED | OUTPATIENT
Start: 2019-05-20 | End: 2019-05-21

## 2019-05-20 RX ORDER — ALPRAZOLAM 0.25 MG/1
0.5 TABLET ORAL DAILY
Status: DISCONTINUED | OUTPATIENT
Start: 2019-05-21 | End: 2019-05-21

## 2019-05-20 RX ORDER — PREDNISONE 20 MG/1
40 TABLET ORAL DAILY
Status: DISCONTINUED | OUTPATIENT
Start: 2019-05-21 | End: 2019-05-22

## 2019-05-20 RX ORDER — ALBUTEROL SULFATE 2.5 MG/.5ML
5 SOLUTION RESPIRATORY (INHALATION)
Status: COMPLETED | OUTPATIENT
Start: 2019-05-20 | End: 2019-05-20

## 2019-05-20 RX ORDER — IPRATROPIUM BROMIDE AND ALBUTEROL SULFATE 2.5; .5 MG/3ML; MG/3ML
3 SOLUTION RESPIRATORY (INHALATION) EVERY 6 HOURS
Status: DISCONTINUED | OUTPATIENT
Start: 2019-05-21 | End: 2019-05-20

## 2019-05-20 RX ORDER — AMOXICILLIN 250 MG
1 CAPSULE ORAL 2 TIMES DAILY
Status: DISCONTINUED | OUTPATIENT
Start: 2019-05-20 | End: 2019-05-22 | Stop reason: HOSPADM

## 2019-05-20 RX ORDER — BUTALBITAL, ACETAMINOPHEN AND CAFFEINE 50; 325; 40 MG/1; MG/1; MG/1
1 TABLET ORAL EVERY 4 HOURS PRN
Status: DISCONTINUED | OUTPATIENT
Start: 2019-05-20 | End: 2019-05-22 | Stop reason: HOSPADM

## 2019-05-20 RX ORDER — AZITHROMYCIN 250 MG/1
500 TABLET, FILM COATED ORAL DAILY
Status: COMPLETED | OUTPATIENT
Start: 2019-05-20 | End: 2019-05-20

## 2019-05-20 RX ORDER — IBUPROFEN 200 MG
24 TABLET ORAL
Status: DISCONTINUED | OUTPATIENT
Start: 2019-05-20 | End: 2019-05-22 | Stop reason: HOSPADM

## 2019-05-20 RX ORDER — ACETAMINOPHEN 500 MG
1000 TABLET ORAL
Status: COMPLETED | OUTPATIENT
Start: 2019-05-20 | End: 2019-05-20

## 2019-05-20 RX ORDER — IPRATROPIUM BROMIDE AND ALBUTEROL SULFATE 2.5; .5 MG/3ML; MG/3ML
3 SOLUTION RESPIRATORY (INHALATION) EVERY 4 HOURS PRN
Status: DISCONTINUED | OUTPATIENT
Start: 2019-05-20 | End: 2019-05-20

## 2019-05-20 RX ORDER — IPRATROPIUM BROMIDE AND ALBUTEROL SULFATE 2.5; .5 MG/3ML; MG/3ML
3 SOLUTION RESPIRATORY (INHALATION) ONCE
Status: DISCONTINUED | OUTPATIENT
Start: 2019-05-20 | End: 2019-05-20 | Stop reason: HOSPADM

## 2019-05-20 RX ORDER — IPRATROPIUM BROMIDE AND ALBUTEROL SULFATE 2.5; .5 MG/3ML; MG/3ML
3 SOLUTION RESPIRATORY (INHALATION)
Status: COMPLETED | OUTPATIENT
Start: 2019-05-20 | End: 2019-05-20

## 2019-05-20 RX ADMIN — AMITRIPTYLINE HYDROCHLORIDE 10 MG: 10 TABLET, FILM COATED ORAL at 10:05

## 2019-05-20 RX ADMIN — KETOROLAC TROMETHAMINE 15 MG: 30 INJECTION, SOLUTION INTRAMUSCULAR at 05:05

## 2019-05-20 RX ADMIN — SODIUM CHLORIDE: 0.9 INJECTION, SOLUTION INTRAVENOUS at 10:05

## 2019-05-20 RX ADMIN — PROPRANOLOL HYDROCHLORIDE 40 MG: 20 TABLET ORAL at 10:05

## 2019-05-20 RX ADMIN — ALBUTEROL SULFATE 5 MG: 2.5 SOLUTION RESPIRATORY (INHALATION) at 05:05

## 2019-05-20 RX ADMIN — IPRATROPIUM BROMIDE AND ALBUTEROL SULFATE 3 ML: .5; 3 SOLUTION RESPIRATORY (INHALATION) at 09:05

## 2019-05-20 RX ADMIN — IOHEXOL 100 ML: 350 INJECTION, SOLUTION INTRAVENOUS at 08:05

## 2019-05-20 RX ADMIN — IPRATROPIUM BROMIDE AND ALBUTEROL SULFATE 3 ML: 2.5; .5 SOLUTION RESPIRATORY (INHALATION) at 01:05

## 2019-05-20 RX ADMIN — DIPHENHYDRAMINE HYDROCHLORIDE 25 MG: 50 INJECTION INTRAMUSCULAR; INTRAVENOUS at 06:05

## 2019-05-20 RX ADMIN — METHYLPREDNISOLONE SODIUM SUCCINATE 125 MG: 125 INJECTION, POWDER, FOR SOLUTION INTRAMUSCULAR; INTRAVENOUS at 05:05

## 2019-05-20 RX ADMIN — SODIUM CHLORIDE 500 ML: 0.9 INJECTION, SOLUTION INTRAVENOUS at 07:05

## 2019-05-20 RX ADMIN — ALBUTEROL SULFATE 5 MG: 2.5 SOLUTION RESPIRATORY (INHALATION) at 07:05

## 2019-05-20 RX ADMIN — AZITHROMYCIN 500 MG: 250 TABLET, FILM COATED ORAL at 10:05

## 2019-05-20 RX ADMIN — IPRATROPIUM BROMIDE AND ALBUTEROL SULFATE 3 ML: .5; 3 SOLUTION RESPIRATORY (INHALATION) at 04:05

## 2019-05-20 RX ADMIN — ACETAMINOPHEN 1000 MG: 500 TABLET ORAL at 05:05

## 2019-05-20 NOTE — ED PROVIDER NOTES
Encounter Date: 2019    SCRIBE #1 NOTE: I, Alan Willoughby, am scribing for, and in the presence of, Vidya Armstrong NP.       History     Chief Complaint   Patient presents with    Cough     x 3 days     Wheezing       Time seen by provider: 4:21 PM on 2019    Renée Goff is a 53 y.o. female with a PMHx of strokes and a MI who presents to the ED with c/o a cough that has been present for 4 days and has worsened in severity today. The pt was seen today at [facility] by Erickson Momin NP and was referred here. Associated sx include wheezing, SOB, congestion, itching ears, CP, and a HA. She describes her chest pain as heaviness and denies having any nausea or vomiting. The pt denies ever being a smoker or any other sx at this time. Past surgical hx includes a  section. No known drug allergies noted.    The history is provided by the patient.     Review of patient's allergies indicates:  No Known Allergies  Past Medical History:   Diagnosis Date    MI (myocardial infarction)     Stroke      Past Surgical History:   Procedure Laterality Date     SECTION      CORONARY ARTERY BYPASS GRAFT       Family History   Problem Relation Age of Onset    Heart attack Maternal Grandfather     Breast cancer Maternal Grandmother      Social History     Tobacco Use    Smoking status: Never Smoker    Smokeless tobacco: Never Used   Substance Use Topics    Alcohol use: No     Alcohol/week: 0.0 oz    Drug use: No     Review of Systems   Constitutional: Negative for activity change, appetite change, chills and fever.   HENT: Positive for congestion. Negative for ear pain, rhinorrhea, sinus pressure, sinus pain, sneezing, sore throat and voice change.         Positive for ears itching.   Eyes: Negative for pain, discharge and redness.   Respiratory: Positive for cough, shortness of breath and wheezing. Negative for stridor.    Cardiovascular: Positive for chest pain (Heaviness.). Negative for  palpitations and leg swelling.   Gastrointestinal: Negative for abdominal distention, abdominal pain, blood in stool, constipation, diarrhea, nausea and vomiting.   Genitourinary: Negative for difficulty urinating, dysuria, flank pain, frequency, hematuria and urgency.   Musculoskeletal: Negative for arthralgias, gait problem, joint swelling and myalgias.   Skin: Negative for rash and wound.   Neurological: Positive for headaches. Negative for dizziness, syncope, facial asymmetry, speech difficulty, weakness, light-headedness and numbness.   Hematological: Negative for adenopathy.   Psychiatric/Behavioral: Negative.        Physical Exam     Initial Vitals [05/20/19 1441]   BP Pulse Resp Temp SpO2   129/67 102 18 98.4 °F (36.9 °C) 96 %      MAP       --         Physical Exam    Nursing note and vitals reviewed.  Constitutional: She appears well-developed and well-nourished. She is not diaphoretic. She is active. No distress.   HENT:   Head: Normocephalic and atraumatic.   Right Ear: External ear normal.   Left Ear: External ear normal.   Nose: Nose normal.   Mouth/Throat: Oropharynx is clear and moist. No oropharyngeal exudate.   Eyes: Conjunctivae, EOM and lids are normal. Pupils are equal, round, and reactive to light. Right eye exhibits no chemosis and no discharge. Left eye exhibits no chemosis and no discharge. Right conjunctiva is not injected. Left conjunctiva is not injected.   Neck: Trachea normal and normal range of motion. Neck supple. No stridor present. No tracheal deviation present. No neck rigidity.   Cardiovascular: Normal rate, regular rhythm, normal heart sounds and normal pulses. Exam reveals no distant heart sounds and no friction rub.    No murmur heard.  Pulmonary/Chest: No stridor. Tachypnea noted. No respiratory distress. She has wheezes. She has no rhonchi. She has no rales.   Bilateral wheezing.   Musculoskeletal: Normal range of motion.   Lymphadenopathy:     She has no cervical adenopathy.    Neurological: She is alert and oriented to person, place, and time. She has normal strength. GCS score is 15. GCS eye subscore is 4. GCS verbal subscore is 5. GCS motor subscore is 6.   Skin: Skin is warm, dry and intact. Capillary refill takes less than 2 seconds. No rash noted.   Psychiatric: She has a normal mood and affect. Her speech is normal and behavior is normal. Thought content normal.         ED Course   Procedures  Labs Reviewed   CBC W/ AUTO DIFFERENTIAL - Abnormal; Notable for the following components:       Result Value    Hematocrit 36.4 (*)     MPV 8.0 (*)     Eos # 1.7 (*)     Eosinophil% 18.5 (*)     All other components within normal limits   COMPREHENSIVE METABOLIC PANEL - Abnormal; Notable for the following components:    Glucose 114 (*)     All other components within normal limits   D DIMER, QUANTITATIVE - Abnormal; Notable for the following components:    D-Dimer 0.67 (*)     All other components within normal limits   TROPONIN I   B-TYPE NATRIURETIC PEPTIDE     EKG Readings: (Independently Interpreted)   Heart Rate: 99 bpm. T Waves Flipped: III, II and I.   No st wave inversions. Unchanged from prior EKG.       Imaging Results          X-Ray Chest PA And Lateral (Final result)  Result time 05/20/19 17:53:00    Final result by Susan España MD (05/20/19 17:53:00)                 Impression:      No acute cardiopulmonary disease.      Electronically signed by: Susan España MD  Date:    05/20/2019  Time:    17:53             Narrative:    EXAMINATION:  XR CHEST PA AND LATERAL    CLINICAL HISTORY:  Shortness of breath    TECHNIQUE:  PA and lateral views of the chest were performed.    COMPARISON:  1/4/2017    FINDINGS:  The heart is not enlarged the postoperative changes suggesting CABG.  The cardiomediastinal silhouette is stable.  The lungs remain clear of infiltrate.  There is no pleural effusion.                                 Medical Decision Making:   History:   Old Medical  Records: I decided to obtain old medical records.  Differential Diagnosis:   PE  Pneumonia  Viral bronchitis   Clinical Tests:   Lab Tests: Ordered and Reviewed  Radiological Study: Ordered and Reviewed  Medical Tests: Reviewed and Ordered       APC / Resident Notes:   Despite neb treatments and steroids, wheezing and shortness of breath persisted. Pt to be place in observation. I have discussed pt with Dr Osborn who agrees with POC. Pt voices understanding and is agreeable to the plan.         Scribe Attestation:   Scribe #1: I performed the above scribed service and the documentation accurately describes the services I performed. I attest to the accuracy of the note.    I, DAVID Wright, personally performed the services described in this documentation. All medical record entries made by the scribe were at my direction and in my presence.  I have reviewed the chart and agree that the record reflects my personal performance and is accurate and complete. DAVID Wright.  7:35 PM 05/20/2019             Clinical Impression:       ICD-10-CM ICD-9-CM   1. Bilateral wheezing R06.2 786.07   2. SOB (shortness of breath) R06.02 786.05         Disposition:   Disposition: Placed in Observation  Condition: Stable                        Vidya Armstrong NP  05/20/19 1293

## 2019-05-20 NOTE — ED NOTES
Presents to the ER with c/o SOB with a productive cough that started 4 days ago. Patient reports that symptoms increased in severity today and she went to Urgent Care and was advised to come to the ED. Associated complaints are chest heaviness, congestion, generalized headache, itchy ears, and wheezing. Mucous membranes are pink and moist. Skin is warm, dry and intact. Expiratory wheezing bilaterally, respirations are regular and unlabored. tachypenic with a rate of 24.  Denies rhinorrhea. BS active x4, no tenderness with palpation, abd is soft and not distended. Denies any appetite or activity change. S1S2, capillary refill is < 2 seconds. Denies dysuria, difficulty urinating, frequency, numbness, tingling or weakness. NAND VSS  Patient denies diaphoresis, nausea, emesis and area of complaint is not reproducible. Patient denies a history of smoking.

## 2019-05-20 NOTE — PROGRESS NOTES
05/20/19 1633   Patient Assessment/Suction   Level of Consciousness (AVPU) alert   Respiratory Effort Mild   All Lung Fields Breath Sounds wheezes, expiratory;wheezes, inspiratory   Cough Frequency frequent   Cough Type good;nonproductive   PRE-TX-O2   O2 Device (Oxygen Therapy) room air   SpO2 96 %   Pulse Oximetry Type Intermittent   Pulse (!) 116   Resp (!) 22   Aerosol Therapy   $ Aerosol Therapy Charges Aerosol Treatment   Respiratory Treatment Status (SVN) given   Treatment Route (SVN) mask   Patient Position (SVN) HOB elevated   Post Treatment Assessment (SVN) breath sounds improved   Signs of Intolerance (SVN) none   Breath Sounds Post-Respiratory Treatment   Throughout All Fields Post-Treatment All Fields   Throughout All Fields Post-Treatment aeration increased   Post-treatment Heart Rate (beats/min) 116   Post-treatment Resp Rate (breaths/min) 19

## 2019-05-20 NOTE — TELEPHONE ENCOUNTER
----- Message from Annabelle Flower sent at 5/20/2019  9:52 AM CDT -----  Name of Who is CallingDENTON REYES [[id number]]    What is the request in detail: Pt wants to know what she can take for a cough.      Can the clinic reply by [facility]:   No       What Number to Call Back if not in [facility]:[phone]

## 2019-05-20 NOTE — PROGRESS NOTES
"Subjective:       Patient ID: Renée Goff is a 53 y.o. female.    Vitals:  height is 5' 4" (1.626 m) and weight is 79.4 kg (175 lb). Her oral temperature is 100.8 °F (38.2 °C) (abnormal). Her blood pressure is 113/82 and her pulse is 86. Her respiration is 18 and oxygen saturation is 97%.     Chief Complaint: Cough    Renée Goff is a 53 year old female presenting to the clinic with c/o cough, sneezing, runny nose, sore throat that began 4-5 days ago. She has had wheezing that began 2 days ago that increased since it began. She reports shortness of breath at rest but denies chest pain. She has taken robitussin and mucinex. She has a history of CVA and CABG.    Cough   This is a new problem. The current episode started in the past 7 days. Associated symptoms include a fever, rhinorrhea, shortness of breath and wheezing. Pertinent negatives include no chest pain, chills, headaches, myalgias, rash or sore throat. Associated symptoms comments: Back pain, sneezing. Treatments tried: robitussin, mucinex D.       Constitution: Positive for fever. Negative for chills and fatigue.   HENT: Negative for congestion and sore throat.    Neck: Negative for painful lymph nodes.   Cardiovascular: Positive for sob on exertion. Negative for chest pain and leg swelling.   Eyes: Negative for double vision and blurred vision.   Respiratory: Positive for cough, shortness of breath and wheezing.    Gastrointestinal: Negative for nausea, vomiting and diarrhea.   Genitourinary: Negative for dysuria, frequency, urgency and history of kidney stones.   Musculoskeletal: Negative for joint pain, joint swelling, muscle cramps and muscle ache.   Skin: Negative for color change, pale, rash and bruising.   Allergic/Immunologic: Negative for seasonal allergies.   Neurological: Negative for dizziness, history of vertigo, light-headedness, passing out and headaches.   Hematologic/Lymphatic: Negative for swollen lymph nodes. "   Psychiatric/Behavioral: Negative for nervous/anxious, sleep disturbance and depression. The patient is not nervous/anxious.        Objective:      Physical Exam   Constitutional: She is oriented to person, place, and time. She appears well-developed and well-nourished. She is cooperative.  Non-toxic appearance. She does not appear ill. No distress.   HENT:   Head: Normocephalic and atraumatic.   Right Ear: Hearing, tympanic membrane, external ear and ear canal normal.   Left Ear: Hearing, tympanic membrane, external ear and ear canal normal.   Nose: Nose normal. No mucosal edema, rhinorrhea or nasal deformity. No epistaxis. Right sinus exhibits no maxillary sinus tenderness and no frontal sinus tenderness. Left sinus exhibits no maxillary sinus tenderness and no frontal sinus tenderness.   Mouth/Throat: Uvula is midline, oropharynx is clear and moist and mucous membranes are normal. No trismus in the jaw. Normal dentition. No uvula swelling. No posterior oropharyngeal erythema.   Eyes: Conjunctivae and lids are normal. Right eye exhibits no discharge. Left eye exhibits no discharge. No scleral icterus.   Sclera clear bilat   Neck: Trachea normal, normal range of motion, full passive range of motion without pain and phonation normal. Neck supple.   Cardiovascular: Normal rate, regular rhythm, normal heart sounds, intact distal pulses and normal pulses.   Pulmonary/Chest: Effort normal. Tachypnea noted. No respiratory distress. She has wheezes (expiratory, bilaterally).   Unable to speak a full sentence, audible wheezing   Abdominal: Soft. Normal appearance and bowel sounds are normal. She exhibits no distension, no pulsatile midline mass and no mass. There is no tenderness.   Musculoskeletal: Normal range of motion. She exhibits no edema or deformity.   Neurological: She is alert and oriented to person, place, and time. She exhibits normal muscle tone. Coordination normal.   Skin: Skin is warm, dry and intact. She  is not diaphoretic. No pallor.   Psychiatric: She has a normal mood and affect. Her speech is normal and behavior is normal. Judgment and thought content normal. Cognition and memory are normal.   Nursing note and vitals reviewed.      Assessment:       1. Wheezing    2. Shortness of breath    3. Tachypnea        Plan:       Patient is tachypneic and audibly wheezing. She reports shortness of breath at rest and is unable to complete a full sentence during exam, even after duo-neb. She has a history of CVA and needs further evaluation to r/o PE, atypical pneumonia. She does have upper respiratory symptoms as well, but will need additional workup to r/o emergent cause. Xray independently interpreted by me with no infiltrate noted. Patient agrees to go to ER; EMS not necessary at this time.    Wheezing  -     XR CHEST PA AND LATERAL; Future; Expected date: 05/20/2019    Shortness of breath    Tachypnea    Other orders  -     albuterol-ipratropium 2.5 mg-0.5 mg/3 mL nebulizer solution 3 mL

## 2019-05-21 LAB
ALBUMIN SERPL BCP-MCNC: 3 G/DL (ref 3.5–5.2)
ALP SERPL-CCNC: 101 U/L (ref 55–135)
ALT SERPL W/O P-5'-P-CCNC: 15 U/L (ref 10–44)
ANION GAP SERPL CALC-SCNC: 9 MMOL/L (ref 8–16)
AST SERPL-CCNC: 19 U/L (ref 10–40)
BASOPHILS # BLD AUTO: 0 K/UL (ref 0–0.2)
BASOPHILS NFR BLD: 0.3 % (ref 0–1.9)
BILIRUB SERPL-MCNC: 0.2 MG/DL (ref 0.1–1)
BUN SERPL-MCNC: 10 MG/DL (ref 6–20)
CALCIUM SERPL-MCNC: 8.8 MG/DL (ref 8.7–10.5)
CHLORIDE SERPL-SCNC: 108 MMOL/L (ref 95–110)
CO2 SERPL-SCNC: 23 MMOL/L (ref 23–29)
CREAT SERPL-MCNC: 0.7 MG/DL (ref 0.5–1.4)
DIFFERENTIAL METHOD: ABNORMAL
EOSINOPHIL # BLD AUTO: 0 K/UL (ref 0–0.5)
EOSINOPHIL NFR BLD: 0.2 % (ref 0–8)
ERYTHROCYTE [DISTWIDTH] IN BLOOD BY AUTOMATED COUNT: 13.1 % (ref 11.5–14.5)
EST. GFR  (AFRICAN AMERICAN): >60 ML/MIN/1.73 M^2
EST. GFR  (NON AFRICAN AMERICAN): >60 ML/MIN/1.73 M^2
GLUCOSE SERPL-MCNC: 146 MG/DL (ref 70–110)
HCT VFR BLD AUTO: 32.3 % (ref 37–48.5)
HGB BLD-MCNC: 10.9 G/DL (ref 12–16)
LYMPHOCYTES # BLD AUTO: 1.4 K/UL (ref 1–4.8)
LYMPHOCYTES NFR BLD: 21.1 % (ref 18–48)
MAGNESIUM SERPL-MCNC: 2 MG/DL (ref 1.6–2.6)
MCH RBC QN AUTO: 29.6 PG (ref 27–31)
MCHC RBC AUTO-ENTMCNC: 33.7 G/DL (ref 32–36)
MCV RBC AUTO: 88 FL (ref 82–98)
MONOCYTES # BLD AUTO: 0.1 K/UL (ref 0.3–1)
MONOCYTES NFR BLD: 1.5 % (ref 4–15)
NEUTROPHILS # BLD AUTO: 5 K/UL (ref 1.8–7.7)
NEUTROPHILS NFR BLD: 76.9 % (ref 38–73)
PLATELET # BLD AUTO: 247 K/UL (ref 150–350)
PMV BLD AUTO: 8 FL (ref 9.2–12.9)
POTASSIUM SERPL-SCNC: 3.8 MMOL/L (ref 3.5–5.1)
PROT SERPL-MCNC: 6.1 G/DL (ref 6–8.4)
RBC # BLD AUTO: 3.68 M/UL (ref 4–5.4)
SODIUM SERPL-SCNC: 140 MMOL/L (ref 136–145)
WBC # BLD AUTO: 6.5 K/UL (ref 3.9–12.7)

## 2019-05-21 PROCEDURE — 25000003 PHARM REV CODE 250: Performed by: NURSE PRACTITIONER

## 2019-05-21 PROCEDURE — G0378 HOSPITAL OBSERVATION PER HR: HCPCS

## 2019-05-21 PROCEDURE — 36415 COLL VENOUS BLD VENIPUNCTURE: CPT

## 2019-05-21 PROCEDURE — 25000242 PHARM REV CODE 250 ALT 637 W/ HCPCS: Performed by: INTERNAL MEDICINE

## 2019-05-21 PROCEDURE — 94761 N-INVAS EAR/PLS OXIMETRY MLT: CPT

## 2019-05-21 PROCEDURE — 99204 OFFICE O/P NEW MOD 45 MIN: CPT | Mod: ,,, | Performed by: INTERNAL MEDICINE

## 2019-05-21 PROCEDURE — 94640 AIRWAY INHALATION TREATMENT: CPT

## 2019-05-21 PROCEDURE — 85025 COMPLETE CBC W/AUTO DIFF WBC: CPT

## 2019-05-21 PROCEDURE — 83735 ASSAY OF MAGNESIUM: CPT

## 2019-05-21 PROCEDURE — 99204 PR OFFICE/OUTPT VISIT, NEW, LEVL IV, 45-59 MIN: ICD-10-PCS | Mod: ,,, | Performed by: INTERNAL MEDICINE

## 2019-05-21 PROCEDURE — 25000242 PHARM REV CODE 250 ALT 637 W/ HCPCS: Performed by: HOSPITALIST

## 2019-05-21 PROCEDURE — 80053 COMPREHEN METABOLIC PANEL: CPT

## 2019-05-21 PROCEDURE — 25000003 PHARM REV CODE 250: Performed by: INTERNAL MEDICINE

## 2019-05-21 PROCEDURE — 25000003 PHARM REV CODE 250: Performed by: HOSPITALIST

## 2019-05-21 PROCEDURE — 63600175 PHARM REV CODE 636 W HCPCS: Performed by: NURSE PRACTITIONER

## 2019-05-21 PROCEDURE — 27000221 HC OXYGEN, UP TO 24 HOURS

## 2019-05-21 RX ORDER — AMITRIPTYLINE HYDROCHLORIDE 10 MG/1
40 TABLET, FILM COATED ORAL NIGHTLY
Status: DISCONTINUED | OUTPATIENT
Start: 2019-05-21 | End: 2019-05-22 | Stop reason: HOSPADM

## 2019-05-21 RX ORDER — METOPROLOL TARTRATE 50 MG/1
50 TABLET ORAL 2 TIMES DAILY
Status: DISCONTINUED | OUTPATIENT
Start: 2019-05-21 | End: 2019-05-22 | Stop reason: HOSPADM

## 2019-05-21 RX ORDER — FLUTICASONE PROPIONATE 50 MCG
2 SPRAY, SUSPENSION (ML) NASAL DAILY
Status: DISCONTINUED | OUTPATIENT
Start: 2019-05-21 | End: 2019-05-22 | Stop reason: HOSPADM

## 2019-05-21 RX ORDER — ALPRAZOLAM 0.25 MG/1
0.5 TABLET ORAL NIGHTLY
Status: DISCONTINUED | OUTPATIENT
Start: 2019-05-21 | End: 2019-05-22 | Stop reason: HOSPADM

## 2019-05-21 RX ORDER — MONTELUKAST SODIUM 10 MG/1
10 TABLET ORAL DAILY
Status: DISCONTINUED | OUTPATIENT
Start: 2019-05-21 | End: 2019-05-22 | Stop reason: HOSPADM

## 2019-05-21 RX ORDER — HYDROCODONE POLISTIREX AND CHLORPHENIRAMINE POLISTIREX 10; 8 MG/5ML; MG/5ML
5 SUSPENSION, EXTENDED RELEASE ORAL EVERY 12 HOURS
Status: DISCONTINUED | OUTPATIENT
Start: 2019-05-21 | End: 2019-05-22

## 2019-05-21 RX ADMIN — ACETAMINOPHEN 1000 MG: 500 TABLET ORAL at 07:05

## 2019-05-21 RX ADMIN — ATORVASTATIN CALCIUM 20 MG: 20 TABLET, FILM COATED ORAL at 09:05

## 2019-05-21 RX ADMIN — IPRATROPIUM BROMIDE AND ALBUTEROL SULFATE 3 ML: .5; 3 SOLUTION RESPIRATORY (INHALATION) at 12:05

## 2019-05-21 RX ADMIN — MONTELUKAST SODIUM 10 MG: 10 TABLET, FILM COATED ORAL at 09:05

## 2019-05-21 RX ADMIN — METOPROLOL TARTRATE 50 MG: 50 TABLET ORAL at 09:05

## 2019-05-21 RX ADMIN — ASPIRIN 81 MG CHEWABLE TABLET 81 MG: 81 TABLET CHEWABLE at 09:05

## 2019-05-21 RX ADMIN — PANTOPRAZOLE SODIUM 40 MG: 40 TABLET, DELAYED RELEASE ORAL at 09:05

## 2019-05-21 RX ADMIN — HYDROCODONE POLISTIREX AND CHLORPHENIRAMINE POLISITREX 5 ML: 10; 8 SUSPENSION, EXTENDED RELEASE ORAL at 02:05

## 2019-05-21 RX ADMIN — AZITHROMYCIN 250 MG: 250 TABLET, FILM COATED ORAL at 09:05

## 2019-05-21 RX ADMIN — METOPROLOL TARTRATE 50 MG: 50 TABLET ORAL at 08:05

## 2019-05-21 RX ADMIN — ALPRAZOLAM 0.5 MG: 0.25 TABLET ORAL at 08:05

## 2019-05-21 RX ADMIN — IPRATROPIUM BROMIDE AND ALBUTEROL SULFATE 3 ML: .5; 3 SOLUTION RESPIRATORY (INHALATION) at 07:05

## 2019-05-21 RX ADMIN — THERA TABS 1 TABLET: TAB at 09:05

## 2019-05-21 RX ADMIN — AMITRIPTYLINE HYDROCHLORIDE 40 MG: 10 TABLET, FILM COATED ORAL at 08:05

## 2019-05-21 RX ADMIN — PREDNISONE 40 MG: 20 TABLET ORAL at 09:05

## 2019-05-21 RX ADMIN — FLUTICASONE PROPIONATE 100 MCG: 50 SPRAY, METERED NASAL at 09:05

## 2019-05-21 NOTE — ASSESSMENT & PLAN NOTE
Established problem.  Eosinophil count remains elevated.  ? eosinophilic related respiratory issue.  Follow CBC.  Pt on steroids at this time.

## 2019-05-21 NOTE — ASSESSMENT & PLAN NOTE
[facility] read mentions multiple areas of mediastinal adenopathy. ?  Reactive to current illness.  Will consult pulmonology-Pt may need follow-up CT to ensure resolution versus further evaluation.  Appreciate input.

## 2019-05-21 NOTE — ASSESSMENT & PLAN NOTE
[facility] read mentions multiple areas of mediastinal adenopathy. ?  Reactive to current illness.      Follow up outpatient with pulmonology--- CT to ensure resolution versus further evaluation.  Appreciate input.  Monitor for other areas of lymphadenopathy

## 2019-05-21 NOTE — ASSESSMENT & PLAN NOTE
Established problem.  Eosinophil count remains elevated. -but now resolved.    ? eosinophilic related respiratory issue.  Follow CBC.  Pt on steroids at this time.

## 2019-05-21 NOTE — HPI
Renée Goff is a 54 yo female with PMHx significant for MI and CVA.  She presented to the ED with complaints of worsening SOB and wheezing, as well as cough for 4 days with worsening of symptoms today.  She states she initially started with some postnasal drip, congestion, and itchy ears.  Her symptoms progressed with incessant, thick green sputum producing cough, and wheezing as well as SOB that seems worsened with any exertion.  She reports symptoms improve some with albuterol inhalers in the ED and cough has stopped completely since that time, however she continues with wheeze any SOB.  Associated symptoms include some chest tightness when she can't catch her breath, as well as with coughing and deep breath.  She denies any fever chills at home, but does denote a fever 100.8 at urgent care prior to arrival in ED.  She denies any recent sick contact or any Hx of asthma or bronchitis.  She denies any nausea, vomiting, urinary complaints, or leg swelling.  Other pertinent medical Hx as below:

## 2019-05-21 NOTE — ASSESSMENT & PLAN NOTE
Chronic, currently controlled.  Continue propanolol, as well as Elavil.  Utilize Fioricet as needed.  Monitor clinically.

## 2019-05-21 NOTE — CONSULTS
2019      Admit Date: 2019  Renée Goff  New Patient Consult    Chief Complaint   Patient presents with    Cough     x 3 days     Wheezing       History of Present Illness:  Patient is a never smoker, had a stroke in  from a defect in the heart.  Her defect was closed.  She is maintained on propranolol.  Patient never had asthma.  Her mother however has severe asthma.  Her brother has no asthma.  Patient has chronic sinus problems.  About half the year she cannot smell.  She takes aspirin daily.  She has never noted any aspirin sensitivity or sensitivity to nonsteroidals.  Patient denies any prior bronchitic or wheezing illnesses.    Five days ago patient began having cough with wheeze chest tightness and some degree of shortness of breath.  The cough is violent.  The chest tightness and pain with became fairly severe.  She estimated to be about a 7/10.  Bronchodilator seem to help somewhat after a visit to the emergency room at admission.  Her sinuses a particularly severe.  She uses over-the-counter pills and sprays although she cannot name which she uses.  Patient did have a temperature of a 100.8° prior to her urgent care evaluation    She has been maintained on propranolol since the  as she had some tachycardia apparently-she sees  in recent years.    Patient is a little bit of lingering right-sided intermittent weakness.  She is very functional.  She does not work.  No pets at the house.  No exposures.    PFSH:  Past Medical History:   Diagnosis Date    MI (myocardial infarction)     Stroke      Past Surgical History:   Procedure Laterality Date     SECTION      CORONARY ARTERY BYPASS GRAFT       Social History     Tobacco Use    Smoking status: Never Smoker    Smokeless tobacco: Never Used   Substance Use Topics    Alcohol use: No     Alcohol/week: 0.0 oz    Drug use: No     Family History   Problem Relation Age of Onset    Asthma Mother     Cancer Father   "   Heart attack Maternal Grandfather     Breast cancer Maternal Grandmother      Review of patient's allergies indicates:  No Known Allergies    Performance Status:Performance Status:The patient's activity level is functions out of house.    Review of Systems:  a review of eleven systems covering constitutional, Psych, Eye, HEENT, Respiratory, Cardiac, GI, , Musculoskeletal, Endocrine, Dermatologicwas negative except the above mentioned abnormalities and for any pertinent findings as listed below:   pertinent positive as above, rest is good        Exam:Comprehensive exam done. /69   Pulse 76   Temp 96.7 °F (35.9 °C)   Resp 20   Ht 5' 4" (1.626 m)   Wt 78.5 kg (173 lb)   LMP 10/12/2012   SpO2 100%   Breastfeeding? No   BMI 29.70 kg/m²   Exam included Vitals as listed, and patient's appearance and affect and alertness and mood, oral exam for yeast and hygiene and pharynx lesions and Mallapatti (M) score, neck with inspection for jvd and masses and thyroid abnormalities and lymph nodes (supraclavicular and infraclavicular nodes also examined and noted if abn), chest exam included symmetry and effort and fremitus and percussion and auscultation, cardiac exam included rhythm and gallops and murmur and rubs and jvd and edema, abdominal exam for mass and hepatosplenomegaly and tenderness and hernias and bowel sounds, Musculoskeletal exam with muscle tone and posture and mobility/gait and  strenght, and skin for rashes and cyanosis and pallor and turgor, extremity for clubbing.  Findings were normal except as listed below:  M1, diffuse sl wheeze/rhonchi, chest is symmetric, no distress, normal percussion, normal fremitus   - nasal , no clubbing      Radiographs reviewed: view by direct vision  - some airway thickening- cta 5/20/19-- no pe, mediastinal nodes prominent but no significant  No results found for this or any previous visit.]    Labs     Recent Labs   Lab 05/21/19  0429   WBC 6.50   HGB " 10.9*   HCT 32.3*        Recent Labs   Lab 05/20/19  1644 05/21/19  0429    140   K 4.4 3.8    108   CO2 26 23   BUN 10 10   CREATININE 0.8 0.7   * 146*   CALCIUM 9.9 8.8   MG  --  2.0   AST 22 19   ALT 16 15   ALKPHOS 111 101   BILITOT 0.3 0.2   PROT 7.0 6.1   ALBUMIN 3.7 3.0*   TROPONINI <0.006  --    BNP 92  --    No results for input(s): PH, PCO2, PO2, HCO3 in the last 24 hours.  Microbiology Results (last 7 days)     ** No results found for the last 168 hours. **      Results for DENTON REYES (MRN [id number]) as of 5/21/2019 08:49   Ref. Range 1/15/2017 03:40 1/16/2017 04:42 1/31/2019 12:06 5/20/2019 16:44 5/21/2019 04:29   Eosinophil% Latest Ref Range: 0.0 - 8.0 % 2.2 3.4 19.3 (H) 18.5 (H) 0.2   Eos # Latest Ref Range: 0.0 - 0.5 K/uL 0.2 0.3 1.5 (H) 1.7 (H) 0.0       Impression:  Active Hospital Problems    Diagnosis  POA    *Acute bronchitis [J20.9]  Yes    Mediastinal adenopathy [R59.0]  Yes    Eosinophilia [D72.1]  Yes    Hypercholesterolemia, baseline .6 [E78.00]  Yes    KIRA (generalized anxiety disorder) [F41.1]  Yes    Perennial sinusitis [J30.89]  Yes    Migraine [G43.909]  Yes      Resolved Hospital Problems   No resolved problems to display.   Plan:     Chr sinus and strong family history of asthma would make asthma big concern.  She does not have a chronic or recurrent history.  She has severe hypereosinophilia.  She be developing triad asthma (?).  If she has chronic or recurrent illness would consider her to be severely asthmatic.  Would not use propranolol in someone would wheezes.  Would recommend going to Lopressor.  Patient should be on a chronic sinus regimen.  CT of the sinuses may be consideration if she does not respond to medical therapy.  Fever was suggest infection triggered this exacerbation.  Sinus infection with seem likely (?).  Prednisone 40 a day should be adequate given 1st exacerbation  or episode of bronchospasm, etc.      If she clears  nicely today- could consider home on oral prednisone 40 a day with a taper over a week, azithromycin, and albuterol.  Would add Singulair and Flonase to her sinus regimen.  Would recommend follow-up in the office in a week.    Would change propranolol to Lopressor

## 2019-05-21 NOTE — ED NOTES
Upon transfer to room 203b, patient is AAOx4, no cardiac or respiratory complications. No needs or questions from patient or family at this time.

## 2019-05-21 NOTE — ASSESSMENT & PLAN NOTE
Associated worsening SOB and significant wheezing.  Remains quite symptomatic despite treatment in ED.    Did  admit for continued scheduled bronchodilators, oral steroids, will initiate oral azithromycin in light of fever in case this is bacterial in etiology.-meds for sinus    Utilize oxygen to maintain sats greater than 92%.    Following  with pulmonology for additional evaluation and management.-and needs fu outpatient

## 2019-05-21 NOTE — PLAN OF CARE
Problem: Adult Inpatient Plan of Care  Goal: Plan of Care Review  Outcome: Ongoing (interventions implemented as appropriate)  Pt alert and oriented x 4  Positioned supine with HOB elevated  Positioned maintained   VSS ex slightly hypotensive, NAD noted   No new complaints  POC reviewed with pt and spouse, acknowledged & understood  Pulmonology consulted for pt  Bed locked in  Lowest position   SCD's and leny vernon applied   Call light in reach  2L nasal canula, SATS 93-96%  Will continue to monitor

## 2019-05-21 NOTE — SUBJECTIVE & OBJECTIVE
"Interval History:   Pt seen/examined-having intractable cough-dry/ with minimal exertion. No chest pain.   No swelling in legs. Mild nasal congestion. No hx asthma  Chest is starting to hurt from coughing.     Review of Systems   Constitutional: Positive for fatigue. Negative for activity change, appetite change, chills and fever.   HENT: Positive for congestion, ear pain ("itching"), postnasal drip, sore throat and trouble swallowing.    Eyes: Negative for photophobia and visual disturbance.   Respiratory: Positive for cough, chest tightness, shortness of breath and wheezing.    Cardiovascular: Negative for chest pain, palpitations and leg swelling.   Gastrointestinal: Negative for abdominal distention, abdominal pain, diarrhea, nausea and vomiting.   Genitourinary: Negative for difficulty urinating, dysuria, flank pain and hematuria.   Musculoskeletal: Negative for arthralgias and myalgias.   Skin: Negative for color change.   Neurological: Negative for dizziness, weakness and headaches.   Psychiatric/Behavioral: Negative for confusion. The patient is not nervous/anxious.      Objective:     Vital Signs (Most Recent):  Temp: 96.4 °F (35.8 °C) (05/21/19 1139)  Pulse: 74 (05/21/19 1202)  Resp: 18 (05/21/19 1202)  BP: (!) 103/54 (05/21/19 1139)  SpO2: 97 % (05/21/19 1202) Vital Signs (24h Range):  Temp:  [96.1 °F (35.6 °C)-97.8 °F (36.6 °C)] 96.4 °F (35.8 °C)  Pulse:  [] 74  Resp:  [17-22] 18  SpO2:  [91 %-100 %] 97 %  BP: ()/(54-86) 103/54     Weight: 78.5 kg (173 lb)  Body mass index is 29.7 kg/m².  No intake or output data in the 24 hours ending 05/21/19 1501   Physical Exam   Constitutional: She is oriented to person, place, and time. She appears well-developed and well-nourished. No distress.   Sounds congested   HENT:   Head: Normocephalic and atraumatic.   Mouth/Throat: Oropharynx is clear and moist.   Ear canals occluded with hard wax   Eyes: Pupils are equal, round, and reactive to light. " Conjunctivae and EOM are normal.   Neck: Normal range of motion. Neck supple. No thyromegaly present.   Cardiovascular: Normal rate, regular rhythm, normal heart sounds and intact distal pulses.   No murmur heard.  Pulmonary/Chest: Effort normal. No stridor. No respiratory distress. She has wheezes (diffuse expiratory wheezing). She has no rales. She exhibits tenderness.   Abdominal: Soft. Bowel sounds are normal. She exhibits no distension. There is no tenderness.   Musculoskeletal: Normal range of motion. She exhibits no edema or tenderness.   Lymphadenopathy:     She has no cervical adenopathy.   Neurological: She is alert and oriented to person, place, and time. No cranial nerve deficit.   Skin: Skin is warm and dry. Capillary refill takes less than 2 seconds. No erythema.   Psychiatric: She has a normal mood and affect. Her behavior is normal. Judgment and thought content normal.   Nursing note and vitals reviewed.      Significant Labs: All pertinent labs within the past 24 hours have been reviewed.  Eosinophilia resolved.     Significant Imaging: I have reviewed and interpreted all pertinent imaging results/findings within the past 24 hours.

## 2019-05-21 NOTE — SUBJECTIVE & OBJECTIVE
"Past Medical History:   Diagnosis Date    MI (myocardial infarction)     Stroke        Past Surgical History:   Procedure Laterality Date     SECTION      CORONARY ARTERY BYPASS GRAFT         Review of patient's allergies indicates:  No Known Allergies    Current Facility-Administered Medications on File Prior to Encounter   Medication    [COMPLETED] albuterol-ipratropium 2.5 mg-0.5 mg/3 mL nebulizer solution 3 mL     Current Outpatient Medications on File Prior to Encounter   Medication Sig    ALPRAZolam (XANAX) 0.5 MG tablet Take 1 tablet (0.5 mg total) by mouth once daily.    amitriptyline (ELAVIL) 10 MG tablet Ninety day supply (Patient taking differently: Take 10 mg by mouth 4 (four) times daily. )    aspirin 81 MG Chew Take 1 tablet (81 mg total) by mouth once daily.    atorvastatin (LIPITOR) 20 MG tablet Take 1 tablet (20 mg total) by mouth once daily.    butalbital-acetaminophen-caffeine -40 mg (FIORICET, ESGIC) -40 mg per tablet Take 1 tablet by mouth every 4 (four) hours as needed for Pain.    hyoscyamine (LEVSIN/SL) 0.125 mg Subl Take 1 tablet by mouth every 6 (six) hours as needed (IBS).     multivitamin (MULTIVITAMIN) per tablet Take 1 tablet by mouth once daily.      pantoprazole (PROTONIX) 40 MG tablet Take 40 mg by mouth once daily.    propranolol (INDERAL) 40 MG tablet Take 1 tablet (40 mg total) by mouth 2 (two) times daily.     Family History     Problem Relation (Age of Onset)    Breast cancer Maternal Grandmother    Heart attack Maternal Grandfather        Tobacco Use    Smoking status: Never Smoker    Smokeless tobacco: Never Used   Substance and Sexual Activity    Alcohol use: No     Alcohol/week: 0.0 oz    Drug use: No    Sexual activity: Yes     Partners: Male     Review of Systems   Constitutional: Negative for activity change, appetite change, chills, fatigue and fever.   HENT: Positive for congestion, ear pain ("itching"), postnasal drip, sore throat " and trouble swallowing.    Eyes: Negative for photophobia and visual disturbance.   Respiratory: Positive for cough, chest tightness, shortness of breath and wheezing.    Cardiovascular: Negative for chest pain, palpitations and leg swelling.   Gastrointestinal: Negative for abdominal distention, abdominal pain, diarrhea, nausea and vomiting.   Genitourinary: Negative for difficulty urinating, dysuria, flank pain and hematuria.   Musculoskeletal: Negative for arthralgias and myalgias.   Skin: Negative for color change.   Neurological: Negative for dizziness, weakness and headaches.   Psychiatric/Behavioral: Negative for confusion. The patient is not nervous/anxious.      Objective:     Vital Signs (Most Recent):  Temp: 98.4 °F (36.9 °C) (05/20/19 1441)  Pulse: 86 (05/20/19 2031)  Resp: 18 (05/20/19 1904)  BP: 124/68 (05/20/19 2031)  SpO2: 95 % (05/20/19 2032) Vital Signs (24h Range):  Temp:  [98.4 °F (36.9 °C)-100.8 °F (38.2 °C)] 98.4 °F (36.9 °C)  Pulse:  [] 86  Resp:  [18-22] 18  SpO2:  [92 %-98 %] 95 %  BP: (113-129)/(67-86) 124/68     Weight: 78.5 kg (173 lb)  Body mass index is 29.7 kg/m².    Physical Exam   Constitutional: She is oriented to person, place, and time. She appears well-developed and well-nourished. No distress.   Sounds congested, raspy.   HENT:   Head: Normocephalic and atraumatic.   Mouth/Throat: Oropharynx is clear and moist.   Ear canals occluded with hard wax   Eyes: Pupils are equal, round, and reactive to light. Conjunctivae and EOM are normal.   Neck: Normal range of motion. Neck supple. No thyromegaly present.   Cardiovascular: Normal rate, regular rhythm, normal heart sounds and intact distal pulses.   No murmur heard.  Pulmonary/Chest: Effort normal. No stridor. No respiratory distress. She has wheezes (diffuse expiratory wheezing). She has no rales. She exhibits no tenderness.   Abdominal: Soft. Bowel sounds are normal. She exhibits no distension. There is no tenderness.    Musculoskeletal: Normal range of motion. She exhibits no edema or tenderness.   Neurological: She is alert and oriented to person, place, and time. No cranial nerve deficit.   Skin: Skin is warm and dry. Capillary refill takes less than 2 seconds. No erythema.   Psychiatric: She has a normal mood and affect. Her behavior is normal. Judgment and thought content normal.         CRANIAL NERVES     CN III, IV, VI   Pupils are equal, round, and reactive to light.  Extraocular motions are normal.        Significant Labs:   CBC:   Recent Labs   Lab 05/20/19  1644   WBC 9.50   HGB 12.1   HCT 36.4*        CMP:   Recent Labs   Lab 05/20/19  1644      K 4.4      CO2 26   *   BUN 10   CREATININE 0.8   CALCIUM 9.9   PROT 7.0   ALBUMIN 3.7   BILITOT 0.3   ALKPHOS 111   AST 22   ALT 16   ANIONGAP 10   EGFRNONAA >60     Cardiac Markers:   Recent Labs   Lab 05/20/19  1644   BNP 92     Troponin:   Recent Labs   Lab 05/20/19  1644   TROPONINI <0.006     TSH:   Recent Labs   Lab 01/31/19  1206   TSH 2.115       Significant Imaging:  CXR: No acute cardiopulmonary disease.    CTA Chest: Status post CABG. Status post sternotomy. No evidence of a pulmonary embolus. Adenopathy as above.

## 2019-05-21 NOTE — ASSESSMENT & PLAN NOTE
Chronic, currently controlled.  Continue propanolol, as well as Elavil.  Utilize Fioricet as needed.  Monitor clinically.  Dc propranolol-change to lopressor per pulm

## 2019-05-21 NOTE — PLAN OF CARE
CM met with pt bedside. Pt verified all info on facesheet is correct. Pt lives with spouse Cosme who has POA. States we do not have copy but will ask spouse to bring. Pharm of choice is [facility] on [address]. Denies any DME at home. Per pt plan is to DC home with no needs. CM will follow.      05/21/19 0941   Discharge Assessment   Assessment Type Discharge Planning Assessment   Confirmed/corrected address and phone number on facesheet? Yes   Assessment information obtained from? Patient   Communicated expected length of stay with patient/caregiver yes   Prior to hospitilization cognitive status: Alert/Oriented;No Deficits   Prior to hospitalization functional status: Independent   Current cognitive status: Alert/Oriented;No Deficits   Current Functional Status: Independent   Lives With spouse   Able to Return to Prior Arrangements yes   Is patient able to care for self after discharge? Yes   Patient's perception of discharge disposition home or selfcare   Readmission Within the Last 30 Days no previous admission in last 30 days   Patient currently being followed by outpatient case management? No   Patient currently receives any other outside agency services? No   Equipment Currently Used at Home none   Do you have any problems affording any of your prescribed medications? No   Is the patient taking medications as prescribed? no   Does the patient have transportation home? Yes   Transportation Anticipated family or friend will provide   Does the patient receive services at the [facility]? No   Discharge Plan A Home   DME Needed Upon Discharge  none   Patient/Family in Agreement with Plan yes

## 2019-05-21 NOTE — PROGRESS NOTES
"[facility] Hospital Medicine  Progress Note    Patient Name: Renée Goff  MRN: [id number]  Patient Class: OP- Observation   Admission Date: 5/20/2019  Length of Stay: 0 days  Attending Physician: Antionette Claudio MD  Primary Care Provider: Thomas Khan MD        Subjective:     Principal Problem:Acute bronchitis    HPI:  Renée Goff is a 54 yo female with PMHx significant for MI and CVA.  She presented to the ED with complaints of worsening SOB and wheezing, as well as cough for 4 days with worsening of symptoms today.  She states she initially started with some postnasal drip, congestion, and itchy ears.  Her symptoms progressed with incessant, thick green sputum producing cough, and wheezing as well as SOB that seems worsened with any exertion.  She reports symptoms improve some with albuterol inhalers in the ED and cough has stopped completely since that time, however she continues with wheeze any SOB.  Associated symptoms include some chest tightness when she can't catch her breath, as well as with coughing and deep breath.  She denies any fever chills at home, but does denote a fever 100.8 at urgent care prior to arrival in ED.  She denies any recent sick contact or any Hx of asthma or bronchitis.  She denies any nausea, vomiting, urinary complaints, or leg swelling.  Other pertinent medical Hx as below:    Hospital Course:  No notes on file    Interval History:   Pt seen/examined-having intractable cough-dry/ with minimal exertion. No chest pain.   No swelling in legs. Mild nasal congestion. No hx asthma  Chest is starting to hurt from coughing.     Review of Systems   Constitutional: Positive for fatigue. Negative for activity change, appetite change, chills and fever.   HENT: Positive for congestion, ear pain ("itching"), postnasal drip, sore throat and trouble swallowing.    Eyes: Negative for photophobia and visual disturbance.   Respiratory: Positive for cough, chest " tightness, shortness of breath and wheezing.    Cardiovascular: Negative for chest pain, palpitations and leg swelling.   Gastrointestinal: Negative for abdominal distention, abdominal pain, diarrhea, nausea and vomiting.   Genitourinary: Negative for difficulty urinating, dysuria, flank pain and hematuria.   Musculoskeletal: Negative for arthralgias and myalgias.   Skin: Negative for color change.   Neurological: Negative for dizziness, weakness and headaches.   Psychiatric/Behavioral: Negative for confusion. The patient is not nervous/anxious.      Objective:     Vital Signs (Most Recent):  Temp: 96.4 °F (35.8 °C) (05/21/19 1139)  Pulse: 74 (05/21/19 1202)  Resp: 18 (05/21/19 1202)  BP: (!) 103/54 (05/21/19 1139)  SpO2: 97 % (05/21/19 1202) Vital Signs (24h Range):  Temp:  [96.1 °F (35.6 °C)-97.8 °F (36.6 °C)] 96.4 °F (35.8 °C)  Pulse:  [] 74  Resp:  [17-22] 18  SpO2:  [91 %-100 %] 97 %  BP: ()/(54-86) 103/54     Weight: 78.5 kg (173 lb)  Body mass index is 29.7 kg/m².  No intake or output data in the 24 hours ending 05/21/19 1501   Physical Exam   Constitutional: She is oriented to person, place, and time. She appears well-developed and well-nourished. No distress.   Sounds congested   HENT:   Head: Normocephalic and atraumatic.   Mouth/Throat: Oropharynx is clear and moist.   Ear canals occluded with hard wax   Eyes: Pupils are equal, round, and reactive to light. Conjunctivae and EOM are normal.   Neck: Normal range of motion. Neck supple. No thyromegaly present.   Cardiovascular: Normal rate, regular rhythm, normal heart sounds and intact distal pulses.   No murmur heard.  Pulmonary/Chest: Effort normal. No stridor. No respiratory distress. She has wheezes (diffuse expiratory wheezing). She has no rales. She exhibits tenderness.   Abdominal: Soft. Bowel sounds are normal. She exhibits no distension. There is no tenderness.   Musculoskeletal: Normal range of motion. She exhibits no edema or  tenderness.   Lymphadenopathy:     She has no cervical adenopathy.   Neurological: She is alert and oriented to person, place, and time. No cranial nerve deficit.   Skin: Skin is warm and dry. Capillary refill takes less than 2 seconds. No erythema.   Psychiatric: She has a normal mood and affect. Her behavior is normal. Judgment and thought content normal.   Nursing note and vitals reviewed.      Significant Labs: All pertinent labs within the past 24 hours have been reviewed.  Eosinophilia resolved.     Significant Imaging: I have reviewed and interpreted all pertinent imaging results/findings within the past 24 hours.    Assessment/Plan:      * Acute bronchitis  Associated worsening SOB and significant wheezing.  Remains quite symptomatic despite treatment in ED.    Did  admit for continued scheduled bronchodilators, oral steroids, will initiate oral azithromycin in light of fever in case this is bacterial in etiology.-meds for sinus    Utilize oxygen to maintain sats greater than 92%.    Following  with pulmonology for additional evaluation and management.-and needs fu outpatient         Mediastinal adenopathy  [facility] read mentions multiple areas of mediastinal adenopathy. ?  Reactive to current illness.      Follow up outpatient with pulmonology--- CT to ensure resolution versus further evaluation.  Appreciate input.  Monitor for other areas of lymphadenopathy       Eosinophilia  Established problem.  Eosinophil count remains elevated. -but now resolved.    ? eosinophilic related respiratory issue.  Follow CBC.  Pt on steroids at this time.      Hypercholesterolemia, baseline .6  Chronic, continue statin therapy.      Migraine  Chronic, currently controlled.  Continue propanolol, as well as Elavil.  Utilize Fioricet as needed.  Monitor clinically.  Dc propranolol-change to lopressor per pulm       Perennial sinusitis  Established issue, possibly triggered bronchitis.  Oral azithromycin, steroids.  flonase  ns  and singulair initiated -per pulm recommendations       KIRA (generalized anxiety disorder)  Chronic, continue Xanax as needed as per home dose. And amitriptyline at        ASD (atrial septal defect), closed 12/1996  Noted.         VTE Risk Mitigation (From admission, onward)        Ordered     IP VTE LOW RISK PATIENT  Once      05/20/19 2143     Place sequential compression device  Until discontinued      05/20/19 2143     Place ELISSA hose  Until discontinued      05/20/19 2143              Antionette Claudio MD  Department of Hospital Medicine   [facility]

## 2019-05-21 NOTE — NURSING
Pt lying in bed, respirations even and unlabored, no acute distress noted.  She denies pain and discomfort at this time.    Side rails up times two, bed low and locked, call light within reach.

## 2019-05-21 NOTE — ASSESSMENT & PLAN NOTE
Established issue, possibly triggered bronchitis.  Oral azithromycin, steroids.  flonase ns  and singulair initiated -per pulm recommendations

## 2019-05-21 NOTE — ASSESSMENT & PLAN NOTE
Associated worsening SOB and significant wheezing.  Remains quite symptomatic despite treatment in ED.  Will admit for continued scheduled bronchodilators, oral steroids, will initiate oral azithromycin in light of fever in case this is bacterial in etiology.  Utilize oxygen to maintain sats greater than 92%.  Will consult with pulmonology for additional evaluation and management.

## 2019-05-21 NOTE — PLAN OF CARE
Problem: Adult Inpatient Plan of Care  Goal: Plan of Care Review  Outcome: Ongoing (interventions implemented as appropriate)  Pt on 2L NC with Q6 duoneb treatments.

## 2019-05-22 ENCOUNTER — TELEPHONE (OUTPATIENT)
Dept: PULMONOLOGY | Facility: CLINIC | Age: 54
End: 2019-05-22

## 2019-05-22 VITALS
HEIGHT: 64 IN | HEART RATE: 70 BPM | TEMPERATURE: 97 F | DIASTOLIC BLOOD PRESSURE: 55 MMHG | BODY MASS INDEX: 29.53 KG/M2 | SYSTOLIC BLOOD PRESSURE: 115 MMHG | RESPIRATION RATE: 20 BRPM | OXYGEN SATURATION: 97 % | WEIGHT: 173 LBS

## 2019-05-22 PROBLEM — J45.51 SEVERE PERSISTENT ASTHMA WITH ACUTE EXACERBATION: Status: ACTIVE | Noted: 2019-05-22

## 2019-05-22 PROBLEM — D69.6 THROMBOCYTOPENIA: Status: ACTIVE | Noted: 2019-05-22

## 2019-05-22 LAB
ALBUMIN SERPL BCP-MCNC: 2.8 G/DL (ref 3.5–5.2)
ALP SERPL-CCNC: 81 U/L (ref 55–135)
ALT SERPL W/O P-5'-P-CCNC: 15 U/L (ref 10–44)
ANION GAP SERPL CALC-SCNC: 9 MMOL/L (ref 8–16)
AST SERPL-CCNC: 19 U/L (ref 10–40)
BASOPHILS # BLD AUTO: 0 K/UL (ref 0–0.2)
BASOPHILS NFR BLD: 0.2 % (ref 0–1.9)
BILIRUB SERPL-MCNC: 0.1 MG/DL (ref 0.1–1)
BUN SERPL-MCNC: 15 MG/DL (ref 6–20)
CALCIUM SERPL-MCNC: 9 MG/DL (ref 8.7–10.5)
CHLORIDE SERPL-SCNC: 108 MMOL/L (ref 95–110)
CO2 SERPL-SCNC: 23 MMOL/L (ref 23–29)
CREAT SERPL-MCNC: 0.7 MG/DL (ref 0.5–1.4)
DIFFERENTIAL METHOD: ABNORMAL
EOSINOPHIL # BLD AUTO: 0.1 K/UL (ref 0–0.5)
EOSINOPHIL NFR BLD: 0.7 % (ref 0–8)
ERYTHROCYTE [DISTWIDTH] IN BLOOD BY AUTOMATED COUNT: 13.5 % (ref 11.5–14.5)
EST. GFR  (AFRICAN AMERICAN): >60 ML/MIN/1.73 M^2
EST. GFR  (NON AFRICAN AMERICAN): >60 ML/MIN/1.73 M^2
GLUCOSE SERPL-MCNC: 95 MG/DL (ref 70–110)
HCT VFR BLD AUTO: 32.1 % (ref 37–48.5)
HGB BLD-MCNC: 10.6 G/DL (ref 12–16)
LYMPHOCYTES # BLD AUTO: 2.7 K/UL (ref 1–4.8)
LYMPHOCYTES NFR BLD: 25 % (ref 18–48)
MAGNESIUM SERPL-MCNC: 2.2 MG/DL (ref 1.6–2.6)
MCH RBC QN AUTO: 29.1 PG (ref 27–31)
MCHC RBC AUTO-ENTMCNC: 33.1 G/DL (ref 32–36)
MCV RBC AUTO: 88 FL (ref 82–98)
MONOCYTES # BLD AUTO: 0.6 K/UL (ref 0.3–1)
MONOCYTES NFR BLD: 5.7 % (ref 4–15)
NEUTROPHILS # BLD AUTO: 7.4 K/UL (ref 1.8–7.7)
NEUTROPHILS NFR BLD: 68.4 % (ref 38–73)
PLATELET # BLD AUTO: 147 K/UL (ref 150–350)
PLATELET BLD QL SMEAR: ABNORMAL
PMV BLD AUTO: 8.9 FL (ref 9.2–12.9)
POTASSIUM SERPL-SCNC: 3.6 MMOL/L (ref 3.5–5.1)
PROT SERPL-MCNC: 5.6 G/DL (ref 6–8.4)
RBC # BLD AUTO: 3.65 M/UL (ref 4–5.4)
SODIUM SERPL-SCNC: 140 MMOL/L (ref 136–145)
WBC # BLD AUTO: 10.9 K/UL (ref 3.9–12.7)

## 2019-05-22 PROCEDURE — 94640 AIRWAY INHALATION TREATMENT: CPT

## 2019-05-22 PROCEDURE — 25000003 PHARM REV CODE 250: Performed by: NURSE PRACTITIONER

## 2019-05-22 PROCEDURE — 85025 COMPLETE CBC W/AUTO DIFF WBC: CPT

## 2019-05-22 PROCEDURE — 25000003 PHARM REV CODE 250: Performed by: HOSPITALIST

## 2019-05-22 PROCEDURE — G0378 HOSPITAL OBSERVATION PER HR: HCPCS

## 2019-05-22 PROCEDURE — 63600175 PHARM REV CODE 636 W HCPCS: Performed by: INTERNAL MEDICINE

## 2019-05-22 PROCEDURE — 25000242 PHARM REV CODE 250 ALT 637 W/ HCPCS: Performed by: INTERNAL MEDICINE

## 2019-05-22 PROCEDURE — 99214 OFFICE O/P EST MOD 30 MIN: CPT | Mod: ,,, | Performed by: INTERNAL MEDICINE

## 2019-05-22 PROCEDURE — 94761 N-INVAS EAR/PLS OXIMETRY MLT: CPT

## 2019-05-22 PROCEDURE — 25000003 PHARM REV CODE 250: Performed by: INTERNAL MEDICINE

## 2019-05-22 PROCEDURE — 63600175 PHARM REV CODE 636 W HCPCS: Performed by: HOSPITALIST

## 2019-05-22 PROCEDURE — 96376 TX/PRO/DX INJ SAME DRUG ADON: CPT | Performed by: EMERGENCY MEDICINE

## 2019-05-22 PROCEDURE — 25000242 PHARM REV CODE 250 ALT 637 W/ HCPCS: Performed by: HOSPITALIST

## 2019-05-22 PROCEDURE — 36415 COLL VENOUS BLD VENIPUNCTURE: CPT

## 2019-05-22 PROCEDURE — 83735 ASSAY OF MAGNESIUM: CPT

## 2019-05-22 PROCEDURE — 80053 COMPREHEN METABOLIC PANEL: CPT

## 2019-05-22 PROCEDURE — 99214 PR OFFICE/OUTPT VISIT, EST, LEVL IV, 30-39 MIN: ICD-10-PCS | Mod: ,,, | Performed by: INTERNAL MEDICINE

## 2019-05-22 RX ORDER — AZITHROMYCIN 250 MG/1
250 TABLET, FILM COATED ORAL DAILY
Qty: 5 TABLET | Refills: 0 | Status: SHIPPED | OUTPATIENT
Start: 2019-05-22 | End: 2019-05-31 | Stop reason: ALTCHOICE

## 2019-05-22 RX ORDER — BENZONATATE 100 MG/1
200 CAPSULE ORAL 3 TIMES DAILY PRN
Status: DISCONTINUED | OUTPATIENT
Start: 2019-05-22 | End: 2019-05-22 | Stop reason: HOSPADM

## 2019-05-22 RX ORDER — PREDNISONE 20 MG/1
60 TABLET ORAL DAILY
Status: DISCONTINUED | OUTPATIENT
Start: 2019-05-22 | End: 2019-05-22 | Stop reason: HOSPADM

## 2019-05-22 RX ORDER — IPRATROPIUM BROMIDE AND ALBUTEROL SULFATE 2.5; .5 MG/3ML; MG/3ML
3 SOLUTION RESPIRATORY (INHALATION) EVERY 4 HOURS
Status: DISCONTINUED | OUTPATIENT
Start: 2019-05-22 | End: 2019-05-22 | Stop reason: HOSPADM

## 2019-05-22 RX ORDER — FLUTICASONE PROPIONATE 50 MCG
2 SPRAY, SUSPENSION (ML) NASAL DAILY
Qty: 1 BOTTLE | Refills: 11 | Status: SHIPPED | OUTPATIENT
Start: 2019-05-22 | End: 2020-05-11 | Stop reason: SDUPTHER

## 2019-05-22 RX ORDER — MONTELUKAST SODIUM 10 MG/1
10 TABLET ORAL DAILY
Qty: 30 TABLET | Refills: 0 | Status: SHIPPED | OUTPATIENT
Start: 2019-05-22 | End: 2019-05-31 | Stop reason: SDUPTHER

## 2019-05-22 RX ORDER — CODEINE PHOSPHATE AND GUAIFENESIN 10; 100 MG/5ML; MG/5ML
5 SOLUTION ORAL EVERY 4 HOURS PRN
Qty: 473 ML | Refills: 0 | Status: SHIPPED | OUTPATIENT
Start: 2019-05-22 | End: 2020-12-24 | Stop reason: SDUPTHER

## 2019-05-22 RX ORDER — PREDNISONE 20 MG/1
60 TABLET ORAL DAILY
Qty: 30 TABLET | Refills: 0 | Status: SHIPPED | OUTPATIENT
Start: 2019-05-22 | End: 2019-05-31 | Stop reason: ALTCHOICE

## 2019-05-22 RX ORDER — ALBUTEROL SULFATE 0.83 MG/ML
2.5 SOLUTION RESPIRATORY (INHALATION) EVERY 6 HOURS PRN
Qty: 360 EACH | Refills: 3 | Status: SHIPPED | OUTPATIENT
Start: 2019-05-22 | End: 2020-05-21

## 2019-05-22 RX ORDER — ALBUTEROL SULFATE 90 UG/1
AEROSOL, METERED RESPIRATORY (INHALATION)
Qty: 1 INHALER | Refills: 11 | Status: SHIPPED | OUTPATIENT
Start: 2019-05-22

## 2019-05-22 RX ORDER — METOPROLOL TARTRATE 50 MG/1
50 TABLET ORAL 2 TIMES DAILY
Qty: 60 TABLET | Refills: 11 | Status: SHIPPED | OUTPATIENT
Start: 2019-05-22 | End: 2020-05-11 | Stop reason: SDUPTHER

## 2019-05-22 RX ADMIN — IPRATROPIUM BROMIDE AND ALBUTEROL SULFATE 3 ML: .5; 3 SOLUTION RESPIRATORY (INHALATION) at 12:05

## 2019-05-22 RX ADMIN — ASPIRIN 81 MG CHEWABLE TABLET 81 MG: 81 TABLET CHEWABLE at 09:05

## 2019-05-22 RX ADMIN — PREDNISONE 60 MG: 20 TABLET ORAL at 09:05

## 2019-05-22 RX ADMIN — FLUTICASONE PROPIONATE 100 MCG: 50 SPRAY, METERED NASAL at 09:05

## 2019-05-22 RX ADMIN — ATORVASTATIN CALCIUM 20 MG: 20 TABLET, FILM COATED ORAL at 09:05

## 2019-05-22 RX ADMIN — METHYLPREDNISOLONE SODIUM SUCCINATE 80 MG: 40 INJECTION, POWDER, FOR SOLUTION INTRAMUSCULAR; INTRAVENOUS at 09:05

## 2019-05-22 RX ADMIN — PANTOPRAZOLE SODIUM 40 MG: 40 TABLET, DELAYED RELEASE ORAL at 09:05

## 2019-05-22 RX ADMIN — IPRATROPIUM BROMIDE AND ALBUTEROL SULFATE 3 ML: .5; 3 SOLUTION RESPIRATORY (INHALATION) at 07:05

## 2019-05-22 RX ADMIN — THERA TABS 1 TABLET: TAB at 09:05

## 2019-05-22 RX ADMIN — MONTELUKAST SODIUM 10 MG: 10 TABLET, FILM COATED ORAL at 09:05

## 2019-05-22 RX ADMIN — HYDROCODONE POLISTIREX AND CHLORPHENIRAMINE POLISITREX 5 ML: 10; 8 SUSPENSION, EXTENDED RELEASE ORAL at 05:05

## 2019-05-22 RX ADMIN — IPRATROPIUM BROMIDE AND ALBUTEROL SULFATE 3 ML: .5; 3 SOLUTION RESPIRATORY (INHALATION) at 11:05

## 2019-05-22 RX ADMIN — AZITHROMYCIN 250 MG: 250 TABLET, FILM COATED ORAL at 09:05

## 2019-05-22 RX ADMIN — METOPROLOL TARTRATE 50 MG: 50 TABLET ORAL at 09:05

## 2019-05-22 NOTE — PLAN OF CARE
Sent message via [facility] to Dr Soto's staff requesting a f/u appt; they will contact patient to schedule.       05/22/19 8500   Discharge Reassessment   Assessment Type Discharge Planning Reassessment

## 2019-05-22 NOTE — TELEPHONE ENCOUNTER
Scheduled hosp f/u with NP   ----- Message from Jacquie Keene RN sent at 5/22/2019  9:28 AM CDT -----  Patient is discharging home today and needs a f/u appt; please contact patient to schedule.  Thank you

## 2019-05-22 NOTE — PROGRESS NOTES
Progress Note  PULMONARY    Admit Date: 5/20/2019 05/22/2019      SUBJECTIVE:     May 22, still coughing with cough emesis, breathing and sinus better.      PFSH and Allergies reviewed.    OBJECTIVE:     Vitals (Most recent):  Vitals:    05/22/19 0720   BP: (!) 92/50   Pulse: 61   Resp: 18   Temp:        Vitals (24 hour range):  Temp:  [96 °F (35.6 °C)-98.3 °F (36.8 °C)]   Pulse:  [61-79]   Resp:  [17-20]   BP: ()/(50-69)   SpO2:  [93 %-100 %]       Intake/Output Summary (Last 24 hours) at 5/22/2019 0750  Last data filed at 5/22/2019 0500  Gross per 24 hour   Intake 240 ml   Output --   Net 240 ml          Physical Exam:  The patient's neuro status (alertness,orientation,cognitive function,motor skills,), pharyngeal exam (oral lesions, hygiene, abn dentition,), Neck (jvd,mass,thyroid,nodes in neck and above/below clavicle),RESPIRATORY(symmetry,effort,fremitus,percussion,auscultation),  Cor(rhythm,heart tones including gallops,perfusion,edema)ABD(distention,hepatic&splenomegaly,tenderness,masses), Skin(rash,cyanosis),Psyc(affect,judgement,).  Exam negative except for these pertinent findings:    Alert, sl wheezes, chest is symmetric, no distress, normal percussion, normal fremitus    Radiographs reviewed: view by direct vision  No new  No results found for this or any previous visit.]    Labs     Recent Labs   Lab 05/22/19  0435   WBC 10.90   HGB 10.6*   HCT 32.1*   *     Recent Labs   Lab 05/22/19  0435      K 3.6      CO2 23   BUN 15   CREATININE 0.7   GLU 95   CALCIUM 9.0   MG 2.2   AST 19   ALT 15   ALKPHOS 81   BILITOT 0.1   PROT 5.6*   ALBUMIN 2.8*   No results for input(s): PH, PCO2, PO2, HCO3 in the last 24 hours.  Microbiology Results (last 7 days)     ** No results found for the last 168 hours. **          Impression:  Active Hospital Problems    Diagnosis  POA    *Acute bronchitis [J20.9]  Yes    Mediastinal adenopathy [R59.0]  Yes    Eosinophilia [D72.1]  Yes     Hypercholesterolemia, baseline .6 [E78.00]  Yes    KIRA (generalized anxiety disorder) [F41.1]  Yes    Perennial sinusitis [J30.89]  Yes    Migraine [G43.909]  Yes    ASD (atrial septal defect), closed 12/1996 [Q21.1]  Not Applicable      Resolved Hospital Problems   No resolved problems to display.               Plan:     May 22, breathing easier and chest pain better.  eos impressive and plts now low????    outpt on prednisone 60/d (will give solumedrol 80 x1), nebs, lopressor off inderal, singulair and sinus rx.    Needs f/u cbc - if plts worsens/eos go up significantly-- may need eval for hypereosinophilic blood disorder.    Sinuses much better subjectively- consider ct sinus later pending course.      Could be new onset asthma - dx not clear but time  outpt f/u needed to eval    Dc prednisone 60/d, nebs up to q 4, flonase/singulair, cbc weekly, lopressor (off inderal), codeine cough med prn - office next wk, constance                                 .

## 2019-05-22 NOTE — PLAN OF CARE
I delivered the pts nebulizer to the pts room. She signed the delivery ticket and completed paperwork returned to on-site DME closet. Susi Salcedo, SBW     05/22/19 1044   Post-Acute Status   Post-Acute Authorization E   [facility] Status Set-up Complete

## 2019-05-22 NOTE — HOSPITAL COURSE
Admitted with asthmatic bronchopneumonia/eosinophilia and mediastinal llymphadenopathy. And sinusitis.  She improved with oxygen, nebulizer, antibiotcs, nasal spray and cough medication but still slightly wheezy on dc -and to follow up with Dr Soto in  One week. No oxygen needed.   Alert, nad. Ox3, lungs -scattered exp wheezes, no resp distress. Cor -rrr, no murmur, abd -soft nontender. Ext no edema

## 2019-05-22 NOTE — PLAN OF CARE
05/22/19 0930   Final Note   Assessment Type Final Discharge Note   Anticipated Discharge Disposition Home

## 2019-05-22 NOTE — PLAN OF CARE
Problem: Adult Inpatient Plan of Care  Goal: Plan of Care Review  Outcome: Ongoing (interventions implemented as appropriate)  Cough meds, rwily, anxiety, bed low and locked, cb in reach, bed alarm   05/22/19 1873   Plan of Care Review   Plan of Care Reviewed With patient

## 2019-05-22 NOTE — PLAN OF CARE
Problem: Adult Inpatient Plan of Care  Goal: Plan of Care Review  Outcome: Ongoing (interventions implemented as appropriate)  Pt on room air with 96% sats and Q6 duoneb treatments.

## 2019-05-22 NOTE — DISCHARGE SUMMARY
[facility] Hospital Medicine  Discharge Summary      Patient Name: Renée Goff  MRN: [id number]  Admission Date: 5/20/2019  Hospital Length of Stay: 0 days  Discharge Date and Time: 5/22/2019  1:55 PM  Attending Physician: No att. providers found   Discharging Provider: Antionette Claudio MD  Primary Care Provider: Thomas Khan MD      HPI:   Renée Goff is a 52 yo female with PMHx significant for MI and CVA.  She presented to the ED with complaints of worsening SOB and wheezing, as well as cough for 4 days with worsening of symptoms today.  She states she initially started with some postnasal drip, congestion, and itchy ears.  Her symptoms progressed with incessant, thick green sputum producing cough, and wheezing as well as SOB that seems worsened with any exertion.  She reports symptoms improve some with albuterol inhalers in the ED and cough has stopped completely since that time, however she continues with wheeze any SOB.  Associated symptoms include some chest tightness when she can't catch her breath, as well as with coughing and deep breath.  She denies any fever chills at home, but does denote a fever 100.8 at urgent care prior to arrival in ED.  She denies any recent sick contact or any Hx of asthma or bronchitis.  She denies any nausea, vomiting, urinary complaints, or leg swelling.  Other pertinent medical Hx as below:    * No surgery found *      Hospital Course:   Admitted with asthmatic bronchopneumonia/eosinophilia and mediastinal llymphadenopathy. And sinusitis.  She improved with oxygen, nebulizer, antibiotcs, nasal spray and cough medication but still slightly wheezy on dc -and to follow up with Dr Soto in  One week. No oxygen needed.   Alert, nad. Ox3, lungs -scattered exp wheezes, no resp distress. Cor -rrr, no murmur, abd -soft nontender. Ext no edema      Consults:   Consults (From admission, onward)        Status Ordering Provider     Inpatient consult to  "Pulmonology  Once     Provider:  Sage Soto MD    Completed BALTAZAR DOMINGUEZ     Inpatient consult to Social Work/Case Management  Once     Provider:  (Not yet assigned)    SAGE Maddox          No new Assessment & Plan notes have been filed under this hospital service since the last note was generated.  Service: Hospital Medicine    Final Active Diagnoses:    Diagnosis Date Noted POA    PRINCIPAL PROBLEM:  Acute bronchitis [J20.9] 05/20/2019 Yes    Thrombocytopenia [D69.6] 05/22/2019 No    Severe persistent asthma with acute exacerbation [J45.51] 05/22/2019 Yes    Bilateral wheezing [R06.2]  Yes    Mediastinal adenopathy [R59.0] 05/20/2019 Yes    Eosinophilia [D72.1] 05/07/2019 Yes    Hypercholesterolemia, baseline .6 [E78.00] 11/09/2017 Yes    KIRA (generalized anxiety disorder) [F41.1] 11/13/2012 Yes    Perennial sinusitis [J30.89] 11/13/2012 Yes    Migraine [G43.909] 11/13/2012 Yes    ASD (atrial septal defect), closed 12/1996 [Q21.1] 11/13/2012 Not Applicable      Problems Resolved During this Admission:       Discharged Condition: good    Disposition: Home or Self Care    Follow Up:  Follow-up Information     Sage Soto MD In 1 week.    Specialty:  Pulmonary Disease  Why:  his office will contact you to schedule an appt.  Contact information:  [address]  [facility] [address] [phone]             Thomas Khan MD In 2 weeks.    Specialty:  Family Medicine  Contact information:  [address]  [facility] [address]  [facility] [address] [phone]                 Patient Instructions:      NEBULIZER FOR HOME USE   Order Comments: Severe persistent asthma with exacerbation     Order Specific Question Answer Comments   Height: 5' 4" (1.626 m)    Weight: 78.5 kg (173 lb)    Does patient have medical equipment at home? none    Length of need (1-99 months): 99      CBC auto differential   Standing Status: Standing Number of Occurrences: 6 Standing Exp. Date: " 07/20/20     Diet Adult Regular     Notify your health care provider if you experience any of the following:  temperature >100.4     Notify your health care provider if you experience any of the following:  persistent nausea and vomiting or diarrhea     Notify your health care provider if you experience any of the following:  difficulty breathing or increased cough     Notify your health care provider if you experience any of the following:  increased confusion or weakness     Activity as tolerated       Significant Diagnostic Studies:     Results for DENTON REYES (MRN [id number]) as of 5/22/2019 16:40   Ref. Range 5/20/2019 16:44 5/21/2019 04:29 5/22/2019 04:35   WBC Latest Ref Range: 3.90 - 12.70 K/uL 9.50 6.50 10.90   RBC Latest Ref Range: 4.00 - 5.40 M/uL 4.12 3.68 (L) 3.65 (L)   Hemoglobin Latest Ref Range: 12.0 - 16.0 g/dL 12.1 10.9 (L) 10.6 (L)   Hematocrit Latest Ref Range: 37.0 - 48.5 % 36.4 (L) 32.3 (L) 32.1 (L)   MCV Latest Ref Range: 82 - 98 fL 88 88 88   MCH Latest Ref Range: 27.0 - 31.0 pg 29.5 29.6 29.1   MCHC Latest Ref Range: 32.0 - 36.0 g/dL 33.4 33.7 33.1   RDW Latest Ref Range: 11.5 - 14.5 % 13.3 13.1 13.5   Platelets Latest Ref Range: 150 - 350 K/uL 283 247 147 (L)   MPV Latest Ref Range: 9.2 - 12.9 fL 8.0 (L) 8.0 (L) 8.9 (L)   Platelet Estimate Unknown   Appears normal   Gran% Latest Ref Range: 38.0 - 73.0 % 48.8 76.9 (H) 68.4   Gran # (ANC) Latest Ref Range: 1.8 - 7.7 K/uL 4.6 5.0 7.4   Lymph% Latest Ref Range: 18.0 - 48.0 % 24.6 21.1 25.0   Lymph # Latest Ref Range: 1.0 - 4.8 K/uL 2.3 1.4 2.7   Mono% Latest Ref Range: 4.0 - 15.0 % 7.6 1.5 (L) 5.7   Mono # Latest Ref Range: 0.3 - 1.0 K/uL 0.7 0.1 (L) 0.6   Eosinophil% Latest Ref Range: 0.0 - 8.0 % 18.5 (H) 0.2 0.7   Eos # Latest Ref Range: 0.0 - 0.5 K/uL 1.7 (H) 0.0 0.1   Basophil% Latest Ref Range: 0.0 - 1.9 % 0.5 0.3 0.2   Baso # Latest Ref Range: 0.00 - 0.20 K/uL 0.00 0.00 0.00   Results for DENTON REYES (MRN [id number]) as of 5/22/2019  16:40   Ref. Range 5/22/2019 04:35   Sodium Latest Ref Range: 136 - 145 mmol/L 140   Potassium Latest Ref Range: 3.5 - 5.1 mmol/L 3.6   Chloride Latest Ref Range: 95 - 110 mmol/L 108   CO2 Latest Ref Range: 23 - 29 mmol/L 23   Anion Gap Latest Ref Range: 8 - 16 mmol/L 9   BUN, Bld Latest Ref Range: 6 - 20 mg/dL 15   Creatinine Latest Ref Range: 0.5 - 1.4 mg/dL 0.7   eGFR if non African American Latest Ref Range: >60 mL/min/1.73 m^2 >60   eGFR if  Latest Ref Range: >60 mL/min/1.73 m^2 >60   Glucose Latest Ref Range: 70 - 110 mg/dL 95   Calcium Latest Ref Range: 8.7 - 10.5 mg/dL 9.0   Magnesium Latest Ref Range: 1.6 - 2.6 mg/dL 2.2   Alkaline Phosphatase Latest Ref Range: 55 - 135 U/L 81   PROTEIN TOTAL Latest Ref Range: 6.0 - 8.4 g/dL 5.6 (L)   Albumin Latest Ref Range: 3.5 - 5.2 g/dL 2.8 (L)   BILIRUBIN TOTAL Latest Ref Range: 0.1 - 1.0 mg/dL 0.1   AST Latest Ref Range: 10 - 40 U/L 19   ALT Latest Ref Range: 10 - 44 U/L 15     Impression       No intraluminal filling defects in pulmonary artery suggesting pulmonary artery embolism.    Additional findings as detailed above including changes of prior CABG, mediastinal adenopathy.    Final read    Virtual Radiology concordant.    Virtual Radiology concordant      Electronically signed by: Susan España MD  Date: 05/21/2019  Time: 08:26         Pending Diagnostic Studies:     None         Medications:  Reconciled Home Medications:      Medication List      START taking these medications    * albuterol 2.5 mg /3 mL (0.083 %) nebulizer solution  Commonly known as:  PROVENTIL  Take 3 mLs (2.5 mg total) by nebulization every 6 (six) hours as needed for Wheezing.     * albuterol 90 mcg/actuation inhaler  Commonly known as:  PROVENTIL/VENTOLIN HFA  2 puffs every 4 hours as needed for cough, wheeze, or shortness of breath     azithromycin 250 MG tablet  Commonly known as:  Z-POSRHA  Take 1 tablet (250 mg total) by mouth once daily.     fluticasone propionate 50  mcg/actuation nasal spray  Commonly known as:  FLONASE  2 sprays (100 mcg total) by Each Nare route once daily.     guaifenesin-codeine 100-10 mg/5 ml  mg/5 mL syrup  Commonly known as:  TUSSI-ORGANIDIN NR  Take 5 mLs by mouth every 4 (four) hours as needed for Cough.     metoprolol tartrate 50 MG tablet  Commonly known as:  LOPRESSOR  Take 1 tablet (50 mg total) by mouth 2 (two) times daily.     montelukast 10 mg tablet  Commonly known as:  SINGULAIR  Take 1 tablet (10 mg total) by mouth once daily.     predniSONE 20 MG tablet  Commonly known as:  DELTASONE  Take 3 tablets (60 mg total) by mouth once daily. for 10 days         * This list has 2 medication(s) that are the same as other medications prescribed for you. Read the directions carefully, and ask your doctor or other care provider to review them with you.            CHANGE how you take these medications    amitriptyline 10 MG tablet  Commonly known as:  ELAVIL  Ninety day supply  What changed:    · how much to take  · how to take this  · when to take this  · additional instructions        CONTINUE taking these medications    ALPRAZolam 0.5 MG tablet  Commonly known as:  XANAX  Take 1 tablet (0.5 mg total) by mouth once daily.     aspirin 81 MG Chew  Take 1 tablet (81 mg total) by mouth once daily.     atorvastatin 20 MG tablet  Commonly known as:  LIPITOR  Take 1 tablet (20 mg total) by mouth once daily.     butalbital-acetaminophen-caffeine -40 mg -40 mg per tablet  Commonly known as:  FIORICET, ESGIC  Take 1 tablet by mouth every 4 (four) hours as needed for Pain.     hyoscyamine 0.125 mg Subl  Commonly known as:  LEVSIN/SL  Take 1 tablet by mouth every 6 (six) hours as needed (IBS).     ONE DAILY MULTIVITAMIN per tablet  Generic drug:  multivitamin  Take 1 tablet by mouth once daily.     pantoprazole 40 MG tablet  Commonly known as:  PROTONIX  Take 40 mg by mouth once daily.        STOP taking these medications    propranolol 40 MG  tablet  Commonly known as:  INDERAL            Indwelling Lines/Drains at time of discharge:   Lines/Drains/Airways          None          Time spent on the discharge of patient: 32 minutes  Patient was seen and examined on the date of discharge and determined to be suitable for discharge.    Long discussion wth patient re follow up and discussed with pulm re home medication regimen and fu for labs/xrays. With Dr Charles Claudio MD  Department of Hospital Medicine  [facility]

## 2019-05-22 NOTE — PLAN OF CARE
I sent the pts Face sheet, H&P, and Nebulizer orders to [facility] for processing. Susi Salcedo LCSW     Approval received from [facility] with [facility] to pull nebulizer from on-site DME closet. Susi Salcedo LCSW       05/22/19 0928   Post-Acute Status   Post-Acute Authorization HME   HME Status Referrals Sent

## 2019-05-22 NOTE — DISCHARGE INSTRUCTIONS
Thank you for choosing [facility] for your medical care. The primary doctor who is taking care of you at the time of your discharge is Antionette Claudio MD.     Your were admitted to the hospital with Acute bronchitis.     Please note your discharge instructions, including diet/activity restrictions, follow-up appointments, and medication changes.  If you have any questions about your medical issues, prescriptions, or any other questions, please feel free to contact the [facility] Medicine Dept at [phone] and we will help.    Please direct all long term medication refills and follow up to your primary care provider, Thomas Khan MD. Thank you again for letting us take care of your health care needs.

## 2019-05-23 ENCOUNTER — TELEPHONE (OUTPATIENT)
Dept: MEDSURG UNIT | Facility: HOSPITAL | Age: 54
End: 2019-05-23

## 2019-05-23 ENCOUNTER — TELEPHONE (OUTPATIENT)
Dept: PULMONOLOGY | Facility: CLINIC | Age: 54
End: 2019-05-23

## 2019-05-23 DIAGNOSIS — E78.00 HYPERCHOLESTEROLEMIA: ICD-10-CM

## 2019-05-23 RX ORDER — ATORVASTATIN CALCIUM 20 MG/1
20 TABLET, FILM COATED ORAL DAILY
Qty: 90 TABLET | Refills: 3 | Status: SHIPPED | OUTPATIENT
Start: 2019-05-23 | End: 2019-11-25 | Stop reason: SDUPTHER

## 2019-05-23 NOTE — TELEPHONE ENCOUNTER
----- Message from Jyothi Pate sent at 5/23/2019  1:34 PM CDT -----  Contact: Renée carballo  Type:  RX Refill Request    Who Called:  Renée  Refill or New Rx:  refill  RX Name and Strength:  atorvastatin (LIPITOR) 20 MG tablet  How is the patient currently taking it? (ex. 1XDay):  As directed  Is this a 30 day or 90 day RX:  90  Preferred Pharmacy with phone number:    [facility]/pharmacy #[id number] - [place] - [address]  [address] [place] [address]  Phone: [phone] Fax: [phone]    Local or Mail Order:  local  Ordering Provider:  Narendra  Best Call Back Number:  [phone]  Additional Information:  Pls call pt when refill is called in.

## 2019-05-23 NOTE — TELEPHONE ENCOUNTER
Pt advised no labs needed prior to appt   ----- Message from Jeff Gill sent at 5/23/2019  9:49 AM CDT -----  Type: Needs Medical Advice    Who Called: self Symptoms (please be specific): NA   How long has patient had these symptoms:  Pharmacy name and phone #:    Best Call Back Number: [phone]  Additional Information: Patient asking if she should do labs prior to seeing Ms Michael

## 2019-05-31 ENCOUNTER — OFFICE VISIT (OUTPATIENT)
Dept: PULMONOLOGY | Facility: CLINIC | Age: 54
End: 2019-05-31
Payer: COMMERCIAL

## 2019-05-31 VITALS
BODY MASS INDEX: 30.3 KG/M2 | HEIGHT: 64 IN | WEIGHT: 177.5 LBS | DIASTOLIC BLOOD PRESSURE: 80 MMHG | SYSTOLIC BLOOD PRESSURE: 132 MMHG | HEART RATE: 60 BPM | OXYGEN SATURATION: 97 %

## 2019-05-31 DIAGNOSIS — D64.9 ANEMIA, UNSPECIFIED TYPE: ICD-10-CM

## 2019-05-31 DIAGNOSIS — J45.50 SEVERE PERSISTENT ASTHMA WITHOUT COMPLICATION: Primary | ICD-10-CM

## 2019-05-31 DIAGNOSIS — J20.9 ACUTE BRONCHITIS, UNSPECIFIED ORGANISM: ICD-10-CM

## 2019-05-31 PROCEDURE — 99214 OFFICE O/P EST MOD 30 MIN: CPT | Mod: S$GLB,,, | Performed by: NURSE PRACTITIONER

## 2019-05-31 PROCEDURE — 99999 PR PBB SHADOW E&M-EST. PATIENT-LVL IV: CPT | Mod: PBBFAC,,, | Performed by: NURSE PRACTITIONER

## 2019-05-31 PROCEDURE — 99214 PR OFFICE/OUTPT VISIT, EST, LEVL IV, 30-39 MIN: ICD-10-PCS | Mod: S$GLB,,, | Performed by: NURSE PRACTITIONER

## 2019-05-31 PROCEDURE — 99999 PR PBB SHADOW E&M-EST. PATIENT-LVL IV: ICD-10-PCS | Mod: PBBFAC,,, | Performed by: NURSE PRACTITIONER

## 2019-05-31 RX ORDER — AZITHROMYCIN 500 MG/1
500 TABLET, FILM COATED ORAL DAILY
Qty: 3 TABLET | Refills: 2 | Status: SHIPPED | OUTPATIENT
Start: 2019-05-31 | End: 2019-06-03

## 2019-05-31 RX ORDER — FLUTICASONE FUROATE AND VILANTEROL 200; 25 UG/1; UG/1
1 POWDER RESPIRATORY (INHALATION) DAILY
Qty: 1 EACH | Refills: 5 | Status: SHIPPED | OUTPATIENT
Start: 2019-05-31 | End: 2021-06-18 | Stop reason: ALTCHOICE

## 2019-05-31 RX ORDER — MONTELUKAST SODIUM 10 MG/1
10 TABLET ORAL DAILY
Qty: 90 TABLET | Refills: 3 | Status: SHIPPED | OUTPATIENT
Start: 2019-05-31 | End: 2019-06-30

## 2019-05-31 RX ORDER — PREDNISONE 20 MG/1
TABLET ORAL
Qty: 9 TABLET | Refills: 1 | Status: SHIPPED | OUTPATIENT
Start: 2019-05-31 | End: 2021-06-18 | Stop reason: ALTCHOICE

## 2019-05-31 NOTE — PROGRESS NOTES
5/31/2019    [facility] Follow Up: Asthma exacerbation    Chief Complaint   Patient presents with    Shortness of Breath    Cough    Sputum Production     green     Wheezing       HPI:  May 31, 2019- Discharged [facility] 5/22/2019  Hx MI and CVA, CABG. tx for sinus infection and asthma exacerbation. Sinus pain, congestion, rhinorrhea- onset 7 days, worsened with time, Cough- onset 7 days, productive green in color, worse at night, no improvement with OTC mucinex, Wheeze- onset 7 days, audible, worse at night. Shortness of breath- onset 7 days, worse with exertion, worse with coughing spells, relieved with rest. Chest tightness at night. Using albuterol nebulizer 2x daily for SOB    Hospital Course:   Admitted with asthmatic bronchopneumonia/eosinophilia and mediastinal llymphadenopathy. And sinusitis.  She improved with oxygen, nebulizer, antibiotcs, nasal spray and cough medication but still slightly wheezy on dc -and to follow up with Dr Soto in  One week. No oxygen needed.   Alert, nad. Ox3, lungs -scattered exp wheezes, no resp distress. Cor -rrr, no murmur, abd -soft nontender. Ext no edema       Dr. Soto Consult  5/21/19- History of Present Illness:  Patient is a never smoker, had a stroke in 1996 from a defect in the heart.  Her defect was closed.  She is maintained on propranolol.  Patient never had asthma.  Her mother however has severe asthma.  Her brother has no asthma.  Patient has chronic sinus problems.  About half the year she cannot smell.  She takes aspirin daily.  She has never noted any aspirin sensitivity or sensitivity to nonsteroidals.  Patient denies any prior bronchitic or wheezing illnesses.     Five days ago patient began having cough with wheeze chest tightness and some degree of shortness of breath.  The cough is violent.  The chest tightness and pain with became fairly severe.  She estimated to be about a 7/10.  Bronchodilator seem to help somewhat after a visit to  the emergency room at admission.  Her sinuses a particularly severe.  She uses over-the-counter pills and sprays although she cannot name which she uses.  Patient did have a temperature of a 100.8° prior to her urgent care evaluation     She has been maintained on propranolol since the 90s as she had some tachycardia apparently-she sees  in recent years.     Patient is a little bit of lingering right-sided intermittent weakness.  She is very functional.  She does not work.  No pets at the house.  No exposures.          The chief compliant  problem is new to me  PFSH:  Past Medical History:   Diagnosis Date    MI (myocardial infarction)     Stroke          Past Surgical History:   Procedure Laterality Date     SECTION      CORONARY ARTERY BYPASS GRAFT       Social History     Tobacco Use    Smoking status: Never Smoker    Smokeless tobacco: Never Used   Substance Use Topics    Alcohol use: No     Alcohol/week: 0.0 oz    Drug use: No     Family History   Problem Relation Age of Onset    Asthma Mother     Cancer Father     Heart attack Maternal Grandfather     Breast cancer Maternal Grandmother      Review of patient's allergies indicates:  No Known Allergies  I have reviewed past medical, family, and social history. I have reviewed previous nurse notes.    Performance Status:The patient's activity level is no limits with regular activity.          Review of Systems   Constitutional: Negative for activity change, appetite change, chills, diaphoresis, fatigue, fever and unexpected weight change.   HENT: Negative for dental problem, postnasal drip, rhinorrhea, sinus pressure, sinus pain, sneezing, sore throat, trouble swallowing and voice change.    Respiratory: Negative for apnea, and stridor.  Positive for cough, chest tightness, shortness of breath, wheezing   Cardiovascular: Negative for chest pain, palpitations and leg swelling.   Gastrointestinal: Negative for abdominal distention,  "abdominal pain, constipation and nausea.   Musculoskeletal: Negative for gait problem, myalgias and neck pain.   Skin: Negative for color change and pallor.   Allergic/Immunologic: Negative for environmental allergies and food allergies.   Neurological: Negative for dizziness, speech difficulty, weakness, light-headedness, numbness and headaches.   Hematological: Negative for adenopathy. Does not bruise/bleed easily.   Psychiatric/Behavioral: Negative for dysphoric mood and sleep disturbance. The patient is not nervous/anxious.           Exam:Comprehensive exam done. /80 (BP Location: Left arm, Patient Position: Sitting)   Pulse 60   Ht 5' 4" (1.626 m)   Wt 80.5 kg (177 lb 7.5 oz)   LMP 10/12/2012   SpO2 97% Comment: on room air  BMI 30.46 kg/m²   Exam included Vitals as listed  Constitutional: oriented to person, place, and time.  appears well-developed. No distress.   Mouth/Throat: Uvula is midline, oropharynx is clear and moist and mucous membranes are normal. No dental caries. No oropharyngeal exudate, posterior oropharyngeal edema, posterior oropharyngeal erythema or tonsillar abscesses.  Mallapatti (M) score 2  Eyes: Pupils are equal, round, and reactive to light.   Neck: No JVD present. No thyromegaly present.   Cardiovascular: Normal rate, regular rhythm and normal heart sounds. Exam reveals no gallop and no friction rub.   No murmur heard.  Pulmonary/Chest: Effort normal and breath sounds normal. No accessory muscle usage or stridor. No apnea and no tachypnea. No respiratory distress, decreased breath sounds, wheezes, rhonchi, rales, or tenderness.   Abdominal: Soft. exhibits no mass. There is no tenderness. No hepatosplenomegaly, hernias and normoactive bowel sounds  Musculoskeletal: Normal range of motion. exhibits no edema.   Lymphadenopathy:     no cervical adenopathy.   no axillary adenopathy.   Neurological:  alert and oriented to person, place, and time. not disoriented.   Skin: Skin is " warm and dry. Capillary refill takes less 2 sec. No cyanosis or erythema. No pallor. Nails show no clubbing.   Psychiatric: normal mood and affect. behavior is normal. Judgment and thought content normal.       Radiographs (ct chest and cxr) reviewed: results reviewed   XR CHEST PA AND LATERAL 05/20/2019 normal  CTA Chest Non-Coronary 05/21/2019 prior CABG, mediastinal adenopathy.             Labs reviewed       Lab Results   Component Value Date    WBC 10.90 05/22/2019    RBC 3.65 (L) 05/22/2019    HGB 10.6 (L) 05/22/2019    HCT 32.1 (L) 05/22/2019    MCV 88 05/22/2019    MCH 29.1 05/22/2019    MCHC 33.1 05/22/2019    RDW 13.5 05/22/2019     (L) 05/22/2019    MPV 8.9 (L) 05/22/2019    GRAN 7.4 05/22/2019    GRAN 68.4 05/22/2019    LYMPH 2.7 05/22/2019    LYMPH 25.0 05/22/2019    MONO 0.6 05/22/2019    MONO 5.7 05/22/2019    EOS 0.1 05/22/2019    BASO 0.00 05/22/2019    EOSINOPHIL 0.7 05/22/2019    BASOPHIL 0.2 05/22/2019   Results for RENÉE REYES (MRN [id number]) as of 5/31/2019 11:24   Ref. Range 5/20/2019 16:44   Eos # Latest Ref Range: 0.0 - 0.5 K/uL 1.7 (H)       PFT was not done      Plan:  Clinical impression is apparently straight forward and impression with management as below.    Renée MORENO was seen today for shortness of breath, cough, sputum production and wheezing.    Diagnoses and all orders for this visit:    Severe persistent asthma without complication  -     montelukast (SINGULAIR) 10 mg tablet; Take 1 tablet (10 mg total) by mouth once daily.  -     fluticasone furoate-vilanterol (BREO ELLIPTA) 200-25 mcg/dose DsDv diskus inhaler; Inhale 1 puff into the lungs once daily. Controller  -     predniSONE (DELTASONE) 20 MG tablet; Take one pill a day for three days, repeat for shortness of breath  -     azithromycin (ZITHROMAX) 500 MG tablet; Take 1 tablet (500 mg total) by mouth once daily. for 3 days  -     Complete PFT with bronchodilator; Future    Acute bronchitis, unspecified organism  -      predniSONE (DELTASONE) 20 MG tablet; Take one pill a day for three days, repeat for shortness of breath  -     azithromycin (ZITHROMAX) 500 MG tablet; Take 1 tablet (500 mg total) by mouth once daily. for 3 days    Anemia, unspecified type  -     CBC auto differential; Standing        Follow up in about 3 months (around 8/31/2019), or if symptoms worsen or fail to improve.    Discussed with patient above for education the following:      Patient Instructions   Asthma Action plan  Start taking Breo 200 1 puff once a day every day, rinse mouth after using      Azithromycin 500 mg 1 pill for three days for yellow or green mucous    Prednisone 20 mg pills, Take one pill a day for three days, repeat for shortness of breath or wheeze, chest tightness    Albuterol Inhaler 1-2 puffs every 4 hours, for cough or shortness of breath    Albuterol nebulizer when needed for shortness of breath    Pulmonary function test to evaluate the strength of your lungs    Continue Singulair daily

## 2019-05-31 NOTE — PATIENT INSTRUCTIONS
Asthma Action plan  Start taking Breo 200 1 puff once a day every day, rinse mouth after using      Azithromycin 500 mg 1 pill for three days for yellow or green mucous    Prednisone 20 mg pills, Take one pill a day for three days, repeat for shortness of breath or wheeze, chest tightness    Albuterol Inhaler 1-2 puffs every 4 hours, for cough or shortness of breath    Albuterol nebulizer when needed for shortness of breath    Pulmonary function test to evaluate the strength of your lungs    Continue Singulair daily

## 2019-06-12 ENCOUNTER — HOSPITAL ENCOUNTER (OUTPATIENT)
Dept: RESPIRATORY THERAPY | Facility: HOSPITAL | Age: 54
Discharge: HOME | End: 2019-06-12
Attending: NURSE PRACTITIONER
Payer: COMMERCIAL

## 2019-06-12 DIAGNOSIS — J45.50 SEVERE PERSISTENT ASTHMA WITHOUT COMPLICATION: ICD-10-CM

## 2019-06-12 PROCEDURE — 94060 EVALUATION OF WHEEZING: CPT | Mod: 26,,, | Performed by: INTERNAL MEDICINE

## 2019-06-12 PROCEDURE — 94060 EVALUATION OF WHEEZING: CPT

## 2019-06-12 PROCEDURE — 94727 GAS DIL/WSHOT DETER LNG VOL: CPT

## 2019-06-12 PROCEDURE — 94060 PR EVAL OF BRONCHOSPASM: ICD-10-PCS | Mod: 26,,, | Performed by: INTERNAL MEDICINE

## 2019-06-12 PROCEDURE — 94729 PR C02/MEMBANE DIFFUSE CAPACITY: ICD-10-PCS | Mod: 26,,, | Performed by: INTERNAL MEDICINE

## 2019-06-12 PROCEDURE — 94727 PR PULM FUNCTION TEST BY GAS: ICD-10-PCS | Mod: 26,,, | Performed by: INTERNAL MEDICINE

## 2019-06-12 PROCEDURE — 94729 DIFFUSING CAPACITY: CPT | Mod: 26,,, | Performed by: INTERNAL MEDICINE

## 2019-06-12 PROCEDURE — 94727 GAS DIL/WSHOT DETER LNG VOL: CPT | Mod: 26,,, | Performed by: INTERNAL MEDICINE

## 2019-06-12 PROCEDURE — 94729 DIFFUSING CAPACITY: CPT

## 2019-06-13 LAB
BRPFT: ABNORMAL
DLCO ADJ PRE: 14.89 ML/(MIN*MMHG) (ref 18–29.46)
DLCO SINGLE BREATH LLN: 18
DLCO SINGLE BREATH PRE REF: 62.8 %
DLCO SINGLE BREATH REF: 23.73
DLCOC SBVA LLN: 3.28
DLCOC SBVA PRE REF: 67.8 %
DLCOC SBVA REF: 4.78
DLCOC SINGLE BREATH LLN: 18
DLCOC SINGLE BREATH PRE REF: 62.8 %
DLCOC SINGLE BREATH REF: 23.73
DLCOVA LLN: 3.28
DLCOVA PRE REF: 67.8 %
DLCOVA PRE: 3.24 ML/(MIN*MMHG*L) (ref 3.28–6.27)
DLCOVA REF: 4.78
DLVAADJ PRE: 3.24 ML/(MIN*MMHG*L) (ref 3.28–6.27)
ERVN2 LLN: 0.89
ERVN2 PRE REF: 96.2 %
ERVN2 PRE: 0.86 L (ref 0.89–0.89)
ERVN2 REF: 0.89
FEF 25 75 CHG: -1 %
FEF 25 75 LLN: 1.35
FEF 25 75 POST REF: 136.5 %
FEF 25 75 PRE REF: 137.9 %
FEF 25 75 REF: 2.51
FET100 CHG: 7.1 %
FEV1 CHG: -0.8 %
FEV1 FVC CHG: -1.3 %
FEV1 FVC LLN: 68
FEV1 FVC POST REF: 105 %
FEV1 FVC PRE REF: 106.4 %
FEV1 FVC REF: 80
FEV1 LLN: 2.04
FEV1 POST REF: 101.4 %
FEV1 PRE REF: 102.3 %
FEV1 REF: 2.65
FRCN2 LLN: 1.88
FRCN2 PRE REF: 63.1 %
FRCN2 REF: 2.71
FVC CHG: 0.5 %
FVC LLN: 2.58
FVC POST REF: 96 %
FVC PRE REF: 95.6 %
FVC REF: 3.34
IVC PRE: 3.3 L (ref 2.58–4.11)
IVC SINGLE BREATH LLN: 2.58
IVC SINGLE BREATH PRE REF: 98.6 %
IVC SINGLE BREATH REF: 3.34
MVV LLN: 85
MVV PRE REF: 108.5 %
MVV REF: 100
PEF CHG: 15.4 %
PEF LLN: 4.89
PEF POST REF: 88.7 %
PEF PRE REF: 76.9 %
PEF REF: 6.61
POST FEF 25 75: 3.43 L/S (ref 1.35–3.67)
POST FET 100: 5.64 SEC
POST FEV1 FVC: 83.89 % (ref 68.42–91.41)
POST FEV1: 2.69 L (ref 2.04–3.27)
POST FVC: 3.21 L (ref 2.58–4.11)
POST PEF: 5.87 L/S (ref 4.89–8.33)
PRE DLCO: 14.89 ML/(MIN*MMHG) (ref 18–29.46)
PRE FEF 25 75: 3.47 L/S (ref 1.35–3.67)
PRE FET 100: 5.26 SEC
PRE FEV1 FVC: 85.03 % (ref 68.42–91.41)
PRE FEV1: 2.71 L (ref 2.04–3.27)
PRE FRC N2: 1.71 L
PRE FVC: 3.19 L (ref 2.58–4.11)
PRE MVV: 108 L/MIN (ref 84.61–114.47)
PRE PEF: 5.08 L/S (ref 4.89–8.33)
RVN2 LLN: 1.24
RVN2 PRE REF: 35.9 %
RVN2 PRE: 0.65 L (ref 1.24–2.39)
RVN2 REF: 1.81
RVN2TLCN2 LLN: 27.73
RVN2TLCN2 PRE REF: 43.9 %
RVN2TLCN2 PRE: 16.38 % (ref 27.73–46.91)
RVN2TLCN2 REF: 37.32
TLCN2 LLN: 3.98
TLCN2 PRE REF: 80 %
TLCN2 PRE: 3.98 L (ref 3.98–5.96)
TLCN2 REF: 4.97
VA PRE: 4.6 L (ref 4.82–4.82)
VA SINGLE BREATH LLN: 4.82
VA SINGLE BREATH PRE REF: 95.5 %
VA SINGLE BREATH REF: 4.82
VCMAXN2 LLN: 2.58
VCMAXN2 PRE REF: 99.5 %
VCMAXN2 PRE: 3.32 L (ref 2.58–4.11)
VCMAXN2 REF: 3.34

## 2019-06-17 ENCOUNTER — TELEPHONE (OUTPATIENT)
Dept: PULMONOLOGY | Facility: CLINIC | Age: 54
End: 2019-06-17

## 2019-06-17 NOTE — TELEPHONE ENCOUNTER
LVM to return call   ----- Message from Aye Rodriguez NP sent at 6/14/2019  1:10 PM CDT -----  Pulmonary function test shows lungs to be good. Will review at follow up.

## 2019-07-09 DIAGNOSIS — G43.009 MIGRAINE WITHOUT AURA AND WITHOUT STATUS MIGRAINOSUS, NOT INTRACTABLE: Primary | ICD-10-CM

## 2019-07-09 NOTE — TELEPHONE ENCOUNTER
----- Message from Tana Dickson sent at 7/9/2019  2:14 PM CDT -----  Contact: self  Type:  RX Refill Request      Type:  RX Refill Request  Who Called:Self  Refill or New Rx:  Refill  RX Name and Strength:  Alprazolam  How is the patient currently taking it? (ex. 1XDay):    Is this a 30 day or 90 day RX:  90  Preferred Pharmacy with phone number:  [facility]--[phone]  Local or Mail Order:  Local  Ordering Provider:  Narendra  Best Call Back Number:  [phone] (home)   Additional Information:  Patient requests to be called when this Rx has been sent

## 2019-07-10 RX ORDER — ALPRAZOLAM 0.5 MG/1
0.5 TABLET ORAL DAILY
Qty: 90 TABLET | Refills: 0 | Status: SHIPPED | OUTPATIENT
Start: 2019-07-10 | End: 2019-10-08 | Stop reason: SDUPTHER

## 2019-09-20 RX ORDER — AMITRIPTYLINE HYDROCHLORIDE 10 MG/1
TABLET, FILM COATED ORAL
Qty: 360 TABLET | Refills: 1 | Status: SHIPPED | OUTPATIENT
Start: 2019-09-20 | End: 2020-03-18 | Stop reason: SDUPTHER

## 2019-10-07 ENCOUNTER — TELEPHONE (OUTPATIENT)
Dept: NEUROLOGY | Facility: CLINIC | Age: 54
End: 2019-10-07

## 2019-10-07 NOTE — TELEPHONE ENCOUNTER
----- Message from Shwetha Marx sent at 10/7/2019  4:06 PM CDT -----    Type:  RX Refill Request    Who Called: ALPRAZolam  Refill RX Name and Strength:    How is the patient currently taking it? (ex. 1XDay):    Is this a 30 day or 90 day RX:  90  day  Preferred Pharmacy with phone number:   [id number]   [phone]    Best Call Back Number: [phone]  Additional Information:       pt needs  A refill

## 2019-10-07 NOTE — TELEPHONE ENCOUNTER
Called patient and informed her no medications can be refilled until she comes in clinic to be assessed. Patient has not been since 2017. No answer , left detailed message.

## 2019-10-07 NOTE — TELEPHONE ENCOUNTER
----- Message from Geraldine Montoya sent at 10/7/2019  4:41 PM CDT -----  Contact: patient  Type:  Patient Returning Call    Who Called:  patient  Who Left Message for Patient:  roberto  Does the patient know what this is regarding?:  yes  Best Call Back Number:  [phone]  Additional Information:  Sent message to the pod. Thanks!

## 2019-10-08 DIAGNOSIS — G43.009 MIGRAINE WITHOUT AURA AND WITHOUT STATUS MIGRAINOSUS, NOT INTRACTABLE: ICD-10-CM

## 2019-10-08 RX ORDER — ALPRAZOLAM 0.5 MG/1
0.5 TABLET ORAL DAILY
Qty: 30 TABLET | Refills: 0 | Status: SHIPPED | OUTPATIENT
Start: 2019-10-08 | End: 2019-11-18 | Stop reason: SDUPTHER

## 2019-10-08 NOTE — TELEPHONE ENCOUNTER
Called patient and scheduled follow up appointment . Also sent medication refill x 1 to Dr Mackey. Patient verbalized understanding.

## 2019-11-14 ENCOUNTER — OFFICE VISIT (OUTPATIENT)
Dept: NEUROLOGY | Facility: CLINIC | Age: 54
End: 2019-11-14
Payer: COMMERCIAL

## 2019-11-14 VITALS
RESPIRATION RATE: 16 BRPM | DIASTOLIC BLOOD PRESSURE: 75 MMHG | WEIGHT: 177.81 LBS | BODY MASS INDEX: 30.35 KG/M2 | HEART RATE: 60 BPM | SYSTOLIC BLOOD PRESSURE: 117 MMHG | HEIGHT: 64 IN

## 2019-11-14 DIAGNOSIS — F41.1 GAD (GENERALIZED ANXIETY DISORDER): ICD-10-CM

## 2019-11-14 DIAGNOSIS — J30.89 PERENNIAL SINUSITIS: ICD-10-CM

## 2019-11-14 DIAGNOSIS — D69.6 THROMBOCYTOPENIA: ICD-10-CM

## 2019-11-14 DIAGNOSIS — G43.019 COMMON MIGRAINE WITH INTRACTABLE MIGRAINE: Primary | ICD-10-CM

## 2019-11-14 PROCEDURE — 99999 PR PBB SHADOW E&M-EST. PATIENT-LVL III: ICD-10-PCS | Mod: PBBFAC,,, | Performed by: PSYCHIATRY & NEUROLOGY

## 2019-11-14 PROCEDURE — 99214 OFFICE O/P EST MOD 30 MIN: CPT | Mod: S$GLB,,, | Performed by: PSYCHIATRY & NEUROLOGY

## 2019-11-14 PROCEDURE — 99214 PR OFFICE/OUTPT VISIT, EST, LEVL IV, 30-39 MIN: ICD-10-PCS | Mod: S$GLB,,, | Performed by: PSYCHIATRY & NEUROLOGY

## 2019-11-14 PROCEDURE — 99999 PR PBB SHADOW E&M-EST. PATIENT-LVL III: CPT | Mod: PBBFAC,,, | Performed by: PSYCHIATRY & NEUROLOGY

## 2019-11-14 RX ORDER — MONTELUKAST SODIUM 10 MG/1
10 TABLET ORAL DAILY
Refills: 3 | COMMUNITY
Start: 2019-09-13 | End: 2020-06-12 | Stop reason: SDUPTHER

## 2019-11-14 NOTE — PROGRESS NOTES
Subjective:       Patient ID: Renée Goff is a 54 y.o. female.    Chief Complaint: Headache  INTERVAL HISTORY 11/14/2019  The patient is a 55 y/o female initially seen in 2017 who presents for follow up. She has not had any headaches since August. Aleve works to abort headaches. She is on elavil QHS and metoprolol. She is on xanax 0.5 mg QHS for anxiety. She reports recent acute stressors that are causing her to feel more anxious that usual. Her  broke his foot and then had an infection that required hospitalization. He was discharged yesterday to home with PICC line for her to give antibiotics three times daily. She states she feels she needs more xanax to help with the anxiety during the day.         INTERVAL HISTORY 10/31/17    She reports she is doing much better. Only 5 headaches in August, none in September, two in October. Her memory is stable, no more amnestic episodes. She was evaluated by Neuropsychologist, ISAAC Schwarz who found no evidence of dementia. There is a suggestion that the amnestic episode was psychogenic in nature. She is on Inderal 40 mg QAm and Elavil 40 mg QHS, takes Aleve early in the course of the attack and if no relief, takes 1/2 tab of Fioricet with complete resolution.    HPI   The patient is a 51 y.o. female who was admitted to the hospital on 1/14/17 for possible stroke. She reported right sided weakness and numbness with no speech difficulty associated with headache and memory loss. There were no no clear precipitating factors. She underwent a head CT, a brain MRI, an US of carotids and a thorough cardia evaluation all of which was normal. The  states that all of a sudden a severe headache that had needed IV morphine resolved. It was a sudden and unexpected resolution. She is back to her baseline. Her  states that in the past she was diagnosed with ASD and that it was closed in 1996. She has history of strokes, however, imaging studies fail to reveal evidence  of past infarcts.Her  states that she has lost memory for 2 years, to the extent that did not know that she had grandchildren or that her mother passed away.  Please see details of headache characteristics headache below.  1. When did your Headaches start?   20 years ago        2. Where are your headaches located?  Forehead and eyes        3. Your headache's characteristics:  Excruciating, Pressure, Stabbing, Sharp        4. How long does the headache last?  days        5. How often does the headache occur?  weekly        6. Are your headaches preceded or accompanied by other symptoms? yes  If yes, please describe. Memory loss and nausea        7. Does the headache awaken you at night? no  If so, how often?            8. Please denise the word that best describes your headache's intensity:   severe        9. Using a scale of 1 through 10, with 0 = no pain and 10 = the worst pain:  What score is your headache now? 0  What score is your headache at its worst? 10  What score is your headache at its best? 3      10. Possible associated headache symptoms:  [x]  Sensitivity to light  [x] Dizziness  [] Nasal or sinus pressure/ pain   [x] Sensitivity to noise  [x] Vertigo  [x] Problems with concentration  [] Sensitivity to smells  [] Ringing in ears  [x] Problems with memory    [x] Blurred vision  [x] Irritability  [x] Problems with task completion   [x] Double vision  [] Anger  [x]  Problems with relaxation  [x] Loss of appetite  [x] Anxiety  [] Neck tightness, Neck pain  [x] Nausea   [] Nasal congestion  [x] Vomiting           11. Headache improving factors:  [x] Sleep  [x] Heat  [x] Darkness  [x] Ice  [] Local pressure [] Menses (period)  [] Massage   [x] Medications:         12. Headache worsening factors:   [] Fatigue [] Sneezing  [] Changes in Weather  [x] Light [x] Bending Over [x] Stress  [x] Noise [] Ovulation  [] Multiple Sclerosis Flare-Up  [] Smells  [] Menses  [] Food   [] Coughing [] Alcohol        13.  Number of caffeinated drinks per day: 3        14. Number of diet drinks per day: 0        Review of Systems   Constitutional: Positive for activity change and fatigue. Negative for appetite change and fever.   HENT: Negative for congestion, dental problem, hearing loss, sinus pressure, tinnitus, trouble swallowing and voice change.    Eyes: Positive for photophobia and pain. Negative for redness and visual disturbance.   Respiratory: Positive for chest tightness and shortness of breath. Negative for cough.    Cardiovascular: Positive for chest pain. Negative for palpitations and leg swelling.   Gastrointestinal: Positive for abdominal pain, nausea and vomiting. Negative for blood in stool.   Endocrine: Negative for cold intolerance and heat intolerance.   Genitourinary: Negative for difficulty urinating, frequency, menstrual problem and urgency.   Musculoskeletal: Positive for back pain. Negative for arthralgias, gait problem, joint swelling, myalgias, neck pain and neck stiffness.   Skin: Positive for color change.   Neurological: Positive for dizziness, light-headedness and headaches. Negative for tremors, seizures, syncope, facial asymmetry, speech difficulty, weakness and numbness.   Hematological: Negative for adenopathy. Does not bruise/bleed easily.   Psychiatric/Behavioral: Positive for confusion and decreased concentration. Negative for agitation, behavioral problems, self-injury, sleep disturbance and suicidal ideas. The patient is nervous/anxious. The patient is not hyperactive.          Past Medical History   Diagnosis Date    MI (myocardial infarction)     Stroke      Past Surgical History   Procedure Laterality Date    Coronary artery bypass graft       Family History   Problem Relation Age of Onset    Heart attack Maternal Grandfather      Social History     Social History    Marital status:      Spouse name: N/A    Number of children: N/A    Years of education: N/A     Occupational  History    Not on file.     Social History Main Topics    Smoking status: Never Smoker    Smokeless tobacco: Never Used    Alcohol use No    Drug use: No    Sexual activity: Yes     Partners: Male     Other Topics Concern    Not on file     Social History Narrative     Review of patient's allergies indicates:  No Known Allergies    Current Outpatient Prescriptions:     alprazolam (XANAX) 0.5 MG tablet, Take 0.5 mg by mouth nightly as needed.  , Disp: , Rfl:     amitriptyline (ELAVIL) 10 MG tablet, Take 40 mg by mouth every evening. , Disp: , Rfl:     aspirin 325 MG tablet, Take 1 tablet (325 mg total) by mouth once daily., Disp: , Rfl: 0    atorvastatin (LIPITOR) 20 MG tablet, Take 1 tablet (20 mg total) by mouth once daily., Disp: 30 tablet, Rfl: 0    butalbital-acetaminophen-caffeine -40 mg (FIORICET, ESGIC) -40 mg per tablet, Take 1 tablet by mouth every 4 (four) hours as needed for Headaches., Disp: 15 tablet, Rfl: 0    meloxicam (MOBIC) 7.5 MG tablet, Take 7.5 mg by mouth 2 (two) times daily., Disp: , Rfl:     multivitamin (MULTIVITAMIN) per tablet, Take 1 tablet by mouth once daily.  , Disp: , Rfl:     propranolol (INDERAL) 40 MG tablet, Take 1 tablet (40 mg total) by mouth 2 (two) times daily., Disp: 180 tablet, Rfl: 3      Objective:      Vitals:    11/14/19 1348   BP: 117/75   Pulse: 60   Resp: 16     Body mass index is 30.52 kg/m².    Physical Exam    Constitutional:   She appears well-developed and well-nourished. She is well groomed  HENT:    Head: Atraumatic, oral and nasal mucosa intact  Eyes: Conjunctivae and EOM are normal. Pupils are equal, round, and reactive to light OU  Neck: Neck supple. No thyromegaly present  Cardiovascular: Normal rate and normal heart sounds  No murmur heard  Pulmonary/Chest: Effort normal and breath sounds normal  Skin: Skin is warm and dry  Psychiatric: Anxious affect     Neuro exam:    Mental status: MOCA score 22/30  Awake, attentive, Alert,  oriented to self, place, year and month  Language function is intact    Cranial Nerves:  Smell was not formally evaluated  Cranial Nerves II - XII: intact  Pursuits were smooth, normal saccades, no nystagmus OU  Funduscopic exam - disc were flat and pink, no exudates or hemorrhages OU  Motor - facial movement was symmetrical and normal     Palate moved well and was symmetrical with normal palatal and oral sensation  Tongue movements were full    Coordination:     Rapid alternating movements and rapid finger tapping - normal bilaterally  Finger to nose - normal and symmetric bilaterally       Motor:  Normal muscle bulk and symmetry. No fasciculations were noted    No pronator drift  Strength 5/5 bilaterally     Reflexes:  Tendon reflexes were 2 + at biceps, triceps, brachioradialis, patellar, and Achilles bilaterally  No clonus was noted     Sensory: Intact to light touch, pin prick in all extremities. Vibration and proprioception intact in all extremities.     Gait: Slow pace, hesitant    Review of Data: Normal ct of the head, normal brain MRI, normal US of carotids        Assessment and Plan   The patient is a 51 y.o. female who was admitted to the hospital on 1/14/17for evaluation for possible stroke. Stroke was ruled out by exam, normal head CT, US of carotids, and MRI of the brain.     Continue with elavil QHS and take xanax 0.5 mg during the day for anxiety. Continue metoprolol for migraine prevention.    Aleve early in the attack and rescue with Fioricet      RTC in 12 months with diaries          Carlos Mackey M.D  Medical Director, Headache and Facial Pain  [facility]

## 2019-11-18 DIAGNOSIS — G43.009 MIGRAINE WITHOUT AURA AND WITHOUT STATUS MIGRAINOSUS, NOT INTRACTABLE: ICD-10-CM

## 2019-11-18 RX ORDER — ALPRAZOLAM 0.5 MG/1
0.5 TABLET ORAL DAILY
Qty: 30 TABLET | Refills: 0 | Status: SHIPPED | OUTPATIENT
Start: 2019-11-18 | End: 2019-11-26 | Stop reason: SDUPTHER

## 2019-11-18 NOTE — TELEPHONE ENCOUNTER
----- Message from Brit  sent at 11/18/2019  8:33 AM CST -----  Contact: pt  Type: Needs Medical Advice    Who Called:  pt    Best Call Back Number:   Additional Information: Requesting a call back from Nurse, regarding Nurse didn't call in Rx ALPRAZolam (XANAX) 0.5 MG tablet after appt Friday ,pt is requesting a 90 day supply instead of 30 ,please call back with advice

## 2019-11-25 DIAGNOSIS — G43.009 MIGRAINE WITHOUT AURA AND WITHOUT STATUS MIGRAINOSUS, NOT INTRACTABLE: ICD-10-CM

## 2019-11-25 DIAGNOSIS — R68.89 FLU-LIKE SYMPTOMS: Primary | ICD-10-CM

## 2019-11-25 DIAGNOSIS — E78.00 HYPERCHOLESTEROLEMIA: ICD-10-CM

## 2019-11-25 RX ORDER — BUTALBITAL, ACETAMINOPHEN AND CAFFEINE 50; 325; 40 MG/1; MG/1; MG/1
1 TABLET ORAL EVERY 4 HOURS PRN
Qty: 12 TABLET | Refills: 3 | Status: SHIPPED | OUTPATIENT
Start: 2019-11-25 | End: 2020-11-13 | Stop reason: SDUPTHER

## 2019-11-25 NOTE — TELEPHONE ENCOUNTER
----- Message from Hansa Narayan sent at 11/25/2019 11:24 AM CST -----  Contact: self  Type:  RX Refill Request    Who Called:  self  Refill or New Rx:  refill  RX Name and Strength: butalbital-acetaminophen-caffeine -40 mg (FIORICET, ESGIC) -40 mg per tablet  How is the patient currently taking it? (ex. 1XDay):    Is this a 30 day or 90 day RX:  30  Preferred Pharmacy with phone number:    [facility]/pharmacy #[id number] - [place] - [address]  [address]  [place] [address]  Phone: [phone] Fax: [phone]  Local or Mail Order:  local  Ordering Provider:  Dr Monet Mackey  Best Call Back Number:  [phone] (home)   Additional Information:  Please call patient when called in. Thanks!

## 2019-11-26 RX ORDER — ATORVASTATIN CALCIUM 20 MG/1
20 TABLET, FILM COATED ORAL DAILY
Qty: 90 TABLET | Refills: 3 | Status: SHIPPED | OUTPATIENT
Start: 2019-11-26 | End: 2020-12-14

## 2019-11-26 RX ORDER — OSELTAMIVIR PHOSPHATE 75 MG/1
75 CAPSULE ORAL 2 TIMES DAILY
Qty: 10 CAPSULE | Refills: 0 | Status: SHIPPED | OUTPATIENT
Start: 2019-11-26 | End: 2019-12-01

## 2019-11-26 RX ORDER — ALPRAZOLAM 0.5 MG/1
0.5 TABLET ORAL DAILY
Qty: 30 TABLET | Refills: 0 | Status: SHIPPED | OUTPATIENT
Start: 2019-11-26 | End: 2020-01-14 | Stop reason: SDUPTHER

## 2019-11-26 NOTE — TELEPHONE ENCOUNTER
----- Message from Thomas Khan MD sent at 11/26/2019  4:28 PM CST -----  We can send some Tamiflu in for her  ----- Message -----  From: Zion Duffy MA  Sent: 11/26/2019   4:26 PM CST  To: Thomas Khan MD    Patient daughter had the flu this pass weekend

## 2019-11-26 NOTE — TELEPHONE ENCOUNTER
Patient states her symptoms started today. She has a headache, body aches, diarrhea, she hasn't checked her temperature but her body is really warm. Please Advise    ----- Message from Delaney Curry sent at 11/26/2019  3:32 PM CST -----  Contact: DENTON REYES  Name of Who is Calling: DENTON REYES      What is the request in detail: Would like to speak to staff regarding having flu-like symptoms. States she would like a prescription sent to her pharmacy, she doesn't feel she can get in the car to come into the office. Please advise.       Can the clinic reply by [facility]: Yes      [facility]: [phone]

## 2019-11-26 NOTE — TELEPHONE ENCOUNTER
----- Message from Emily Salinas sent at 11/25/2019  4:18 PM CST -----  Contact: Pt  Can the clinic reply in [facility]: Yes     Please refill the medication(s) listed below. The patient can be reached at this phone number [phone] (home)  once it is called into the pharmacy.    Medication #1 atorvastatin (LIPITOR) 20 MG tablet//90 day supply     Medication #2    Preferred Pharmacy:[facility]/PHARMACY #[id number] - [place] [facility] [address]

## 2020-01-14 DIAGNOSIS — G43.009 MIGRAINE WITHOUT AURA AND WITHOUT STATUS MIGRAINOSUS, NOT INTRACTABLE: ICD-10-CM

## 2020-01-14 RX ORDER — ALPRAZOLAM 0.5 MG/1
0.5 TABLET ORAL DAILY
Qty: 30 TABLET | Refills: 0 | Status: SHIPPED | OUTPATIENT
Start: 2020-01-14 | End: 2020-02-17

## 2020-02-17 DIAGNOSIS — G43.009 MIGRAINE WITHOUT AURA AND WITHOUT STATUS MIGRAINOSUS, NOT INTRACTABLE: ICD-10-CM

## 2020-02-17 RX ORDER — ALPRAZOLAM 0.5 MG/1
0.5 TABLET ORAL DAILY
Qty: 30 TABLET | Refills: 0 | Status: SHIPPED | OUTPATIENT
Start: 2020-02-17 | End: 2020-03-16 | Stop reason: SDUPTHER

## 2020-03-16 DIAGNOSIS — G43.009 MIGRAINE WITHOUT AURA AND WITHOUT STATUS MIGRAINOSUS, NOT INTRACTABLE: ICD-10-CM

## 2020-03-16 RX ORDER — ALPRAZOLAM 0.5 MG/1
0.5 TABLET ORAL DAILY
Qty: 30 TABLET | Refills: 0 | Status: SHIPPED | OUTPATIENT
Start: 2020-03-16 | End: 2020-04-13

## 2020-03-16 NOTE — TELEPHONE ENCOUNTER
----- Message from Clotilde Cherri sent at 3/16/2020  2:42 PM CDT -----  Contact: self [phone]  .Type: RX Refill Request    Who Called:  Self     Have you contacted your pharmacy: no     Refill or New Rx: refill     RX Name and Strength: ALPRAZolam (XANAX) 0.5 MG tablet    Is this a 30 day or 90 day RX: 90 day     Preferred Pharmacy with phone number: .  [facility]/pharmacy #[id number] - [place] - [address]  [address] [address]  Phone: [phone] Fax: [phone]    Local or Mail Order: local     Would the patient rather a call back or a response via [facility]? Call back     Best Call Back Number [phone]

## 2020-03-18 DIAGNOSIS — G43.019 COMMON MIGRAINE WITH INTRACTABLE MIGRAINE: Primary | ICD-10-CM

## 2020-03-19 RX ORDER — AMITRIPTYLINE HYDROCHLORIDE 10 MG/1
TABLET, FILM COATED ORAL
Qty: 360 TABLET | Refills: 1 | Status: SHIPPED | OUTPATIENT
Start: 2020-03-19 | End: 2020-08-03

## 2020-04-12 DIAGNOSIS — G43.009 MIGRAINE WITHOUT AURA AND WITHOUT STATUS MIGRAINOSUS, NOT INTRACTABLE: ICD-10-CM

## 2020-04-13 RX ORDER — ALPRAZOLAM 0.5 MG/1
0.5 TABLET ORAL DAILY
Qty: 30 TABLET | Refills: 0 | Status: SHIPPED | OUTPATIENT
Start: 2020-04-13 | End: 2020-05-11

## 2020-04-30 ENCOUNTER — TELEPHONE (OUTPATIENT)
Dept: INTERNAL MEDICINE | Facility: CLINIC | Age: 55
End: 2020-04-30

## 2020-04-30 DIAGNOSIS — Z12.31 SCREENING MAMMOGRAM, ENCOUNTER FOR: Primary | ICD-10-CM

## 2020-04-30 NOTE — TELEPHONE ENCOUNTER
LOV: 01/31/2019  Spoke with Renée in regards to her annual exam  July 14, 2020 2:00 with Dr. Khan . Also scheduled her annual mammogram July 14, 2020 3:15 . Appointment reminders will be mailed . Conversation was understood and it ended.

## 2020-05-10 DIAGNOSIS — G43.009 MIGRAINE WITHOUT AURA AND WITHOUT STATUS MIGRAINOSUS, NOT INTRACTABLE: ICD-10-CM

## 2020-05-11 RX ORDER — METOPROLOL TARTRATE 50 MG/1
50 TABLET ORAL 2 TIMES DAILY
Qty: 60 TABLET | Refills: 11 | Status: SHIPPED | OUTPATIENT
Start: 2020-05-11 | End: 2021-06-08 | Stop reason: SDUPTHER

## 2020-05-11 RX ORDER — ALPRAZOLAM 0.5 MG/1
0.5 TABLET ORAL DAILY
Qty: 30 TABLET | Refills: 0 | Status: SHIPPED | OUTPATIENT
Start: 2020-05-11 | End: 2020-06-16

## 2020-05-11 RX ORDER — FLUTICASONE PROPIONATE 50 MCG
2 SPRAY, SUSPENSION (ML) NASAL DAILY
Qty: 16 G | Refills: 11 | Status: SHIPPED | OUTPATIENT
Start: 2020-05-11 | End: 2022-03-03 | Stop reason: SDUPTHER

## 2020-06-12 DIAGNOSIS — J45.50 SEVERE PERSISTENT ASTHMA WITHOUT COMPLICATION: Primary | ICD-10-CM

## 2020-06-12 RX ORDER — MONTELUKAST SODIUM 10 MG/1
10 TABLET ORAL DAILY
Qty: 90 TABLET | Refills: 3 | Status: SHIPPED | OUTPATIENT
Start: 2020-06-12 | End: 2021-08-02

## 2020-07-14 ENCOUNTER — OFFICE VISIT (OUTPATIENT)
Dept: INTERNAL MEDICINE | Facility: CLINIC | Age: 55
End: 2020-07-14
Attending: FAMILY MEDICINE
Payer: COMMERCIAL

## 2020-07-14 ENCOUNTER — HOSPITAL ENCOUNTER (OUTPATIENT)
Dept: RADIOLOGY | Facility: OTHER | Age: 55
Discharge: HOME | End: 2020-07-14
Attending: FAMILY MEDICINE
Payer: COMMERCIAL

## 2020-07-14 ENCOUNTER — TELEPHONE (OUTPATIENT)
Dept: INTERNAL MEDICINE | Facility: CLINIC | Age: 55
End: 2020-07-14

## 2020-07-14 VITALS
SYSTOLIC BLOOD PRESSURE: 112 MMHG | WEIGHT: 187.81 LBS | HEART RATE: 60 BPM | OXYGEN SATURATION: 97 % | BODY MASS INDEX: 32.06 KG/M2 | HEIGHT: 64 IN | DIASTOLIC BLOOD PRESSURE: 80 MMHG

## 2020-07-14 DIAGNOSIS — K21.00 GASTROESOPHAGEAL REFLUX DISEASE WITH ESOPHAGITIS: ICD-10-CM

## 2020-07-14 DIAGNOSIS — G43.009 MIGRAINE WITHOUT AURA AND WITHOUT STATUS MIGRAINOSUS, NOT INTRACTABLE: ICD-10-CM

## 2020-07-14 DIAGNOSIS — Z12.31 SCREENING MAMMOGRAM, ENCOUNTER FOR: ICD-10-CM

## 2020-07-14 DIAGNOSIS — E78.00 HYPERCHOLESTEROLEMIA: ICD-10-CM

## 2020-07-14 DIAGNOSIS — Z00.00 PREVENTATIVE HEALTH CARE: Primary | ICD-10-CM

## 2020-07-14 PROCEDURE — 77067 SCR MAMMO BI INCL CAD: CPT | Mod: TC

## 2020-07-14 PROCEDURE — 77063 BREAST TOMOSYNTHESIS BI: CPT | Mod: 26,,, | Performed by: RADIOLOGY

## 2020-07-14 PROCEDURE — 77067 MAMMO DIGITAL SCREENING BILAT WITH TOMOSYNTHESIS_CAD: ICD-10-PCS | Mod: 26,,, | Performed by: RADIOLOGY

## 2020-07-14 PROCEDURE — 99396 PREV VISIT EST AGE 40-64: CPT | Mod: S$GLB,,, | Performed by: FAMILY MEDICINE

## 2020-07-14 PROCEDURE — 77063 MAMMO DIGITAL SCREENING BILAT WITH TOMOSYNTHESIS_CAD: ICD-10-PCS | Mod: 26,,, | Performed by: RADIOLOGY

## 2020-07-14 PROCEDURE — 99396 PR PREVENTIVE VISIT,EST,40-64: ICD-10-PCS | Mod: S$GLB,,, | Performed by: FAMILY MEDICINE

## 2020-07-14 PROCEDURE — 77067 SCR MAMMO BI INCL CAD: CPT | Mod: 26,,, | Performed by: RADIOLOGY

## 2020-07-14 PROCEDURE — 99999 PR PBB SHADOW E&M-EST. PATIENT-LVL IV: ICD-10-PCS | Mod: PBBFAC,,, | Performed by: FAMILY MEDICINE

## 2020-07-14 PROCEDURE — 99999 PR PBB SHADOW E&M-EST. PATIENT-LVL IV: CPT | Mod: PBBFAC,,, | Performed by: FAMILY MEDICINE

## 2020-07-14 RX ORDER — ALPRAZOLAM 0.5 MG/1
0.5 TABLET ORAL 2 TIMES DAILY PRN
Qty: 60 TABLET | Refills: 0 | Status: SHIPPED | OUTPATIENT
Start: 2020-07-14 | End: 2020-09-21

## 2020-07-14 NOTE — TELEPHONE ENCOUNTER
----- Message from Chelsea Rosas sent at 7/14/2020 10:38 AM CDT -----  Regarding: Returning Staff Call  Name of Who is Calling : DENTON REYES [[id number]]    Patient is returning a call from staff   .....Please contact to further discuss and advise.    Can the clinic reply by [facility] : No    What Number to Call Back :  [phone]

## 2020-07-14 NOTE — PROGRESS NOTES
"CHIEF COMPLAINT:  Annual    HISTORY OF PRESENT ILLNESS: The patient presents with greater than a year of reflux symptoms now accompanied by soft a GI this symptoms.      she is due for her colonoscopy in 4 years.    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    REVIEW OF SYSTEMS:  GENERAL: No fever, chills, fatigability or weight loss.  SKIN: No rashes, itching or changes in color or texture of skin.  HEAD: No headaches or recent head trauma.  EYES: Visual acuity fine. No photophobia, ocular pain or diplopia.  EARS: Denies ear pain, discharge or vertigo.  NOSE: No loss of smell, no epistaxis or postnasal drip.  MOUTH & THROAT: No hoarseness or change in voice. No excessive gum bleeding.  NODES: Denies swollen glands.  CHEST: Denies LARES, cyanosis, wheezing, cough and sputum production.  CARDIOVASCULAR: Denies chest pain, PND, orthopnea or reduced exercise tolerance.  ABDOMEN: Appetite fine. No weight loss. Denies diarrhea, hematemesis or blood in stool.  URINARY: No hematuria.  PERIPHERAL VASCULAR: No claudication or cyanosis.  MUSCULOSKELETAL: No joint stiffness or swelling.   NEUROLOGIC: No history of seizures, paralysis, alteration of gait or coordination.    PHYSICAL EXAMINATION:     /80 (BP Location: Left arm, Patient Position: Sitting, BP Method: Large (Manual))   Pulse 60   Ht 5' 4" (1.626 m)   Wt 85.2 kg (187 lb 13.3 oz)   LMP 10/12/2012   SpO2 97%   BMI 32.24 kg/m²     APPEARANCE: Well nourished, well developed, in no acute distress.    HEAD: Normocephalic, atraumatic.  EYES: PERRL. EOMI.  Conjunctivae without injection and  anicteric  NOSE: Mucosa pink. Airway clear.  MOUTH & THROAT: No tonsillar enlargement. No pharyngeal erythema or exudate. No stridor.  NECK: Supple.   NODES: No cervical, axillary or inguinal lymph node enlargement.  ABDOMEN: Bowel sounds normal. Not distended. Soft. No tenderness or masses.  No ascites is " noted.  MUSCULOSKELETAL:  There is no clubbing, cyanosis, or edema of the extremities x4.  There is full range of motion of the lumbar spine.  There is full range of motion of the extremities x4.  There is no deformity noted.    NEUROLOGIC:       Normal speech development.      Hearing normal.      Normal gait.      Motor and sensory exams grossly normal.  PSYCHIATRIC: Patient is alert and oriented x3.  Thought processes are all normal.  There is no homicidality.  There is no suicidality.  There is no evidence of psychosis.    LABORATORY/RADIOLOGY: Chart reviewed.    ASSESSMENT:   Annual  GERD with esophagitis  Anxiety    PLAN:  We will follow-up blood work which we expect to be normal.  Xanax refilled  Return to clinic in 1 year

## 2020-08-03 ENCOUNTER — HOSPITAL ENCOUNTER (OUTPATIENT)
Dept: RADIOLOGY | Facility: HOSPITAL | Age: 55
Discharge: HOME | End: 2020-08-03
Attending: FAMILY MEDICINE
Payer: COMMERCIAL

## 2020-08-03 DIAGNOSIS — R92.8 ABNORMAL MAMMOGRAM: ICD-10-CM

## 2020-08-03 PROCEDURE — 77061 BREAST TOMOSYNTHESIS UNI: CPT | Mod: TC,PO,LT

## 2020-08-03 PROCEDURE — 76642 ULTRASOUND BREAST LIMITED: CPT | Mod: TC,PO,LT

## 2020-08-03 PROCEDURE — 77065 DX MAMMO INCL CAD UNI: CPT | Mod: TC,PO,LT

## 2020-08-12 ENCOUNTER — TELEPHONE (OUTPATIENT)
Dept: INTERNAL MEDICINE | Facility: CLINIC | Age: 55
End: 2020-08-12

## 2020-08-12 DIAGNOSIS — Z12.31 SCREENING MAMMOGRAM, ENCOUNTER FOR: Primary | ICD-10-CM

## 2020-08-12 NOTE — TELEPHONE ENCOUNTER
----- Message from Gaby Wetzel sent at 8/12/2020  2:46 PM CDT -----  Regarding: lab results  Type:  Test Results    Who Called: Renée     Name of Test (Lab/Mammo/Etc): labs     Date of Test: 07/14     Ordering Provider: Dr. Khan    Where the test was performed: [facility]     Would the patient rather a call back or a response via [facility]? Call back     Best Call Back Number: [phone]

## 2020-08-12 NOTE — TELEPHONE ENCOUNTER
"Informed pt  would like for her to know "based on her recent lab results no further treatment or evaluation is needed. Needs mammogram in late October or early November.  Six-month follow-up."  Pt verbalized understanding   "

## 2020-08-19 ENCOUNTER — TELEPHONE (OUTPATIENT)
Dept: INTERNAL MEDICINE | Facility: CLINIC | Age: 55
End: 2020-08-19

## 2020-08-19 DIAGNOSIS — Z12.31 SCREENING MAMMOGRAM, ENCOUNTER FOR: Primary | ICD-10-CM

## 2020-08-19 NOTE — TELEPHONE ENCOUNTER
----- Message from Joann Berg sent at 8/19/2020  9:44 AM CDT -----  Regarding: mammo orders  Type: Patient Call Back    Who called:pt    What is the request in detail:pt wants to discuss incorrect mammo orders. Call pt    Can the clinic reply by [facility]?    Would the patient rather a call back or a response via [facility]? call    Best call back number:[phone] (home)     Additional Information:

## 2020-08-20 NOTE — TELEPHONE ENCOUNTER
Informed pt correct mammo orders where placed in the system. Pt verbalized understanding and stated she will give there department a call to schedule

## 2020-10-07 ENCOUNTER — PATIENT MESSAGE (OUTPATIENT)
Dept: ADMINISTRATIVE | Facility: HOSPITAL | Age: 55
End: 2020-10-07

## 2020-11-11 RX ORDER — PANTOPRAZOLE SODIUM 40 MG/1
40 TABLET, DELAYED RELEASE ORAL DAILY
Qty: 90 TABLET | Refills: 3 | Status: SHIPPED | OUTPATIENT
Start: 2020-11-11 | End: 2021-12-06

## 2020-11-11 NOTE — TELEPHONE ENCOUNTER
----- Message from Chelsea Rosas sent at 11/10/2020  4:04 PM CST -----  Regarding: Refill Request  Please refill the medication(s) listed below :    Medication # 1 :pantoprazole (PROTONIX) 40 MG tablet      Please call the patient when the prescription is sent to pharmacy :[phone]    Can the clinic reply in [facility] :Yes     Preferred Pharmacy : [facility]/pharmacy #[id number] - [place], [place] - [address] [phone] (Phone)  [phone] (Fax)

## 2020-11-12 ENCOUNTER — HOSPITAL ENCOUNTER (OUTPATIENT)
Dept: RADIOLOGY | Facility: HOSPITAL | Age: 55
Discharge: HOME | End: 2020-11-12
Attending: FAMILY MEDICINE
Payer: COMMERCIAL

## 2020-11-12 VITALS — HEIGHT: 64 IN | WEIGHT: 187.81 LBS | BODY MASS INDEX: 32.06 KG/M2

## 2020-11-12 DIAGNOSIS — Z12.31 SCREENING MAMMOGRAM, ENCOUNTER FOR: ICD-10-CM

## 2020-11-12 PROCEDURE — 77061 BREAST TOMOSYNTHESIS UNI: CPT | Mod: TC,PO,LT

## 2020-11-12 PROCEDURE — 76642 ULTRASOUND BREAST LIMITED: CPT | Mod: TC,PO,LT

## 2020-11-12 PROCEDURE — 77065 DX MAMMO INCL CAD UNI: CPT | Mod: TC,PO,LT

## 2020-11-13 DIAGNOSIS — G43.009 MIGRAINE WITHOUT AURA AND WITHOUT STATUS MIGRAINOSUS, NOT INTRACTABLE: ICD-10-CM

## 2020-11-13 NOTE — TELEPHONE ENCOUNTER
----- Message from Joseline Stern sent at 11/13/2020  4:11 PM CST -----  Regarding: Refill request  Who Called: DENTON REYES [[id number]]    RX Name and Strength: butalbital-acetaminophen-caffeine -40 mg (FIORICET, ESGIC) -40 mg per tablet    Preferred Pharmacy with phone number: [facility]/PHARMACY #[id number] - [place] - [address]    Best Call Back Number: [phone]    Additional Information: N/A

## 2020-11-17 RX ORDER — BUTALBITAL, ACETAMINOPHEN AND CAFFEINE 50; 325; 40 MG/1; MG/1; MG/1
1 TABLET ORAL EVERY 4 HOURS PRN
Qty: 12 TABLET | Refills: 3 | Status: SHIPPED | OUTPATIENT
Start: 2020-11-17 | End: 2022-11-28 | Stop reason: SDUPTHER

## 2020-12-24 RX ORDER — CODEINE PHOSPHATE AND GUAIFENESIN 10; 100 MG/5ML; MG/5ML
5 SOLUTION ORAL EVERY 4 HOURS PRN
Qty: 118 ML | Refills: 0 | Status: SHIPPED | OUTPATIENT
Start: 2020-12-24 | End: 2021-01-03

## 2020-12-24 NOTE — TELEPHONE ENCOUNTER
----- Message from Isabella Wilkins sent at 12/24/2020 11:55 AM CST -----  Contact: DENTON REYES [[id number]]  Type: RX Refill Request Who Called:  DENTON REYES [[id number]] RX Name and Strength:guaifenesin-codeine 100-10 mg/5 ml (TUSSI-ORGANIDIN NR)  mg/5 mL syrup Preferred Pharmacy with phone number:  [facility]/pharmacy #[id number] - [place] - [address]  [address] [address]  Phone: [phone] Fax: [phone]    Would the patient rather a call back or a response via [facility]? [facility] Best Call Back Number:[phone] (mobile)   Additional Information:Patient states to give her a call if the doctor sends in this refill today.

## 2020-12-24 NOTE — TELEPHONE ENCOUNTER
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 EDTA Chelation  2. Superficial keratectomy  3. Placement of bandage contact lens (Kontour 20 mm)     Attending: Carter Salinas MD  Fellow: Mikayla Becerril MD    Anesthesia: General  Findings: As expected   Blood Loss: None  Complications: None     DESCRIPTION OF PROCEDURE:     In the preoperative suite, the patient was identified, the appropriate surgical site was marked and the patient was brought to the operating room and placed in a supine position. The patient's operative eye was then prepped and draped using betadine in the usual sterile fashion for ophthalmic surgery.    The surgical microscope was then brought into appropriate position for the procedure. A wire lid speculum was placed to provide exposure. The globe was inspected and a superotemporal bleb with iris peaking in that direction was noted. A fluorescein strip was used to confirm Joleen negativity. There was band keratopathy and deep stromal NV noted inferiorly, as well as corneal scarring.    A [place] blade was used to scrap the epithelium overlying the inferior band keratopathy. K2-EDTA was applied to the surface of the eye using Weck cell sponges soaked in the EDTA and scrubbed vigorously over the surface of the cornea. After the calcium was noted to dissolve and break-up, the cornea was scrapped with a [place] blade in order to smoothe the surface and remove the loosened Calcium. After removal of the calcium, the eye was then irrigated with BSS. There was still dense corneal scar and deep stromal NV. The cornea was clear superiorly. A bandage contact lens was then placed. Subconjunctival dexamethasone and ancef were administered and the lid speculum removed.    The case was turned over to Dr. Fam to perform the EUA of both eyes. At the  end of the procedure, the cornea team performed a retrobulbar block to the right eye using a 50:50 mixture of 0.75% bupivacaine and 2% lidocaine.     Dr. Carter Salinas was scrubbed and present for the entire surgery.    Mikayla Becerril MD  Cornea & External Disease Fellow    Carter Salinas MD   of Ophthalmology

## 2021-01-05 ENCOUNTER — PATIENT MESSAGE (OUTPATIENT)
Dept: ADMINISTRATIVE | Facility: HOSPITAL | Age: 56
End: 2021-01-05

## 2021-02-09 ENCOUNTER — OFFICE VISIT (OUTPATIENT)
Dept: INTERNAL MEDICINE | Facility: CLINIC | Age: 56
End: 2021-02-09
Attending: FAMILY MEDICINE
Payer: COMMERCIAL

## 2021-02-09 VITALS
SYSTOLIC BLOOD PRESSURE: 115 MMHG | OXYGEN SATURATION: 98 % | HEART RATE: 61 BPM | BODY MASS INDEX: 32.29 KG/M2 | HEIGHT: 64 IN | DIASTOLIC BLOOD PRESSURE: 68 MMHG | WEIGHT: 189.13 LBS

## 2021-02-09 DIAGNOSIS — E78.00 HYPERCHOLESTEROLEMIA: ICD-10-CM

## 2021-02-09 DIAGNOSIS — M54.50 ACUTE BILATERAL LOW BACK PAIN WITHOUT SCIATICA: Primary | ICD-10-CM

## 2021-02-09 PROCEDURE — 99214 PR OFFICE/OUTPT VISIT, EST, LEVL IV, 30-39 MIN: ICD-10-PCS | Mod: 25,S$GLB,, | Performed by: FAMILY MEDICINE

## 2021-02-09 PROCEDURE — 96372 PR INJECTION,THERAP/PROPH/DIAG2ST, IM OR SUBCUT: ICD-10-PCS | Mod: S$GLB,,, | Performed by: FAMILY MEDICINE

## 2021-02-09 PROCEDURE — 96372 THER/PROPH/DIAG INJ SC/IM: CPT | Mod: S$GLB,,, | Performed by: FAMILY MEDICINE

## 2021-02-09 PROCEDURE — 99999 PR PBB SHADOW E&M-EST. PATIENT-LVL IV: CPT | Mod: PBBFAC,,, | Performed by: FAMILY MEDICINE

## 2021-02-09 PROCEDURE — 99214 OFFICE O/P EST MOD 30 MIN: CPT | Mod: 25,S$GLB,, | Performed by: FAMILY MEDICINE

## 2021-02-09 PROCEDURE — 99999 PR PBB SHADOW E&M-EST. PATIENT-LVL IV: ICD-10-PCS | Mod: PBBFAC,,, | Performed by: FAMILY MEDICINE

## 2021-02-09 RX ORDER — KETOROLAC TROMETHAMINE 30 MG/ML
60 INJECTION, SOLUTION INTRAMUSCULAR; INTRAVENOUS
Status: COMPLETED | OUTPATIENT
Start: 2021-02-09 | End: 2021-02-09

## 2021-02-09 RX ORDER — METHYLPREDNISOLONE 4 MG/1
TABLET ORAL
Qty: 21 TABLET | Refills: 0 | Status: SHIPPED | OUTPATIENT
Start: 2021-02-09 | End: 2021-06-18 | Stop reason: ALTCHOICE

## 2021-02-09 RX ORDER — CYCLOBENZAPRINE HCL 10 MG
10 TABLET ORAL 3 TIMES DAILY PRN
Qty: 20 TABLET | Refills: 0 | Status: SHIPPED | OUTPATIENT
Start: 2021-02-09 | End: 2021-02-19

## 2021-02-09 RX ADMIN — KETOROLAC TROMETHAMINE 60 MG: 30 INJECTION, SOLUTION INTRAMUSCULAR; INTRAVENOUS at 11:02

## 2021-02-11 ENCOUNTER — PATIENT MESSAGE (OUTPATIENT)
Dept: INTERNAL MEDICINE | Facility: CLINIC | Age: 56
End: 2021-02-11

## 2021-02-17 ENCOUNTER — TELEPHONE (OUTPATIENT)
Dept: INTERNAL MEDICINE | Facility: CLINIC | Age: 56
End: 2021-02-17

## 2021-02-22 ENCOUNTER — PATIENT OUTREACH (OUTPATIENT)
Dept: ADMINISTRATIVE | Facility: OTHER | Age: 56
End: 2021-02-22

## 2021-03-18 ENCOUNTER — TELEPHONE (OUTPATIENT)
Dept: INTERNAL MEDICINE | Facility: CLINIC | Age: 56
End: 2021-03-18

## 2021-04-05 ENCOUNTER — PATIENT MESSAGE (OUTPATIENT)
Dept: ADMINISTRATIVE | Facility: HOSPITAL | Age: 56
End: 2021-04-05

## 2021-04-07 ENCOUNTER — IMMUNIZATION (OUTPATIENT)
Dept: PRIMARY CARE CLINIC | Facility: CLINIC | Age: 56
End: 2021-04-07
Payer: COMMERCIAL

## 2021-04-07 DIAGNOSIS — Z23 NEED FOR VACCINATION: Primary | ICD-10-CM

## 2021-05-05 ENCOUNTER — HOSPITAL ENCOUNTER (OUTPATIENT)
Dept: RADIOLOGY | Facility: HOSPITAL | Age: 56
Discharge: HOME | End: 2021-05-05
Attending: FAMILY MEDICINE
Payer: COMMERCIAL

## 2021-05-05 ENCOUNTER — IMMUNIZATION (OUTPATIENT)
Dept: PRIMARY CARE CLINIC | Facility: CLINIC | Age: 56
End: 2021-05-05
Payer: COMMERCIAL

## 2021-05-05 DIAGNOSIS — Z12.31 ENCOUNTER FOR SCREENING MAMMOGRAM FOR BREAST CANCER: ICD-10-CM

## 2021-05-05 DIAGNOSIS — Z23 NEED FOR VACCINATION: Primary | ICD-10-CM

## 2021-05-05 PROCEDURE — 76642 ULTRASOUND BREAST LIMITED: CPT | Mod: TC,PO,LT

## 2021-05-05 PROCEDURE — 77066 DX MAMMO INCL CAD BI: CPT | Mod: TC,PO

## 2021-06-08 RX ORDER — METOPROLOL TARTRATE 50 MG/1
50 TABLET ORAL 2 TIMES DAILY
Qty: 14 TABLET | Refills: 0 | OUTPATIENT
Start: 2021-06-08 | End: 2021-06-15

## 2021-06-08 RX ORDER — METOPROLOL TARTRATE 50 MG/1
50 TABLET ORAL 2 TIMES DAILY
Qty: 14 TABLET | Refills: 0 | Status: SHIPPED | OUTPATIENT
Start: 2021-06-08 | End: 2021-06-18 | Stop reason: SDUPTHER

## 2021-06-18 ENCOUNTER — OFFICE VISIT (OUTPATIENT)
Dept: CARDIOLOGY | Facility: CLINIC | Age: 56
End: 2021-06-18
Payer: COMMERCIAL

## 2021-06-18 ENCOUNTER — LAB VISIT (OUTPATIENT)
Dept: LAB | Facility: HOSPITAL | Age: 56
End: 2021-06-18
Attending: INTERNAL MEDICINE
Payer: COMMERCIAL

## 2021-06-18 VITALS
HEIGHT: 64 IN | SYSTOLIC BLOOD PRESSURE: 129 MMHG | BODY MASS INDEX: 33 KG/M2 | DIASTOLIC BLOOD PRESSURE: 76 MMHG | RESPIRATION RATE: 18 BRPM | OXYGEN SATURATION: 100 % | HEART RATE: 57 BPM | WEIGHT: 193.31 LBS

## 2021-06-18 DIAGNOSIS — R00.1 BRADYCARDIA, DRUG INDUCED: ICD-10-CM

## 2021-06-18 DIAGNOSIS — T50.905A BRADYCARDIA, DRUG INDUCED: ICD-10-CM

## 2021-06-18 DIAGNOSIS — R06.09 DOE (DYSPNEA ON EXERTION): Primary | ICD-10-CM

## 2021-06-18 DIAGNOSIS — Z91.89 CARDIOVASCULAR RISK FACTOR: ICD-10-CM

## 2021-06-18 DIAGNOSIS — R94.31 NONSPECIFIC ABNORMAL ELECTROCARDIOGRAM (ECG): ICD-10-CM

## 2021-06-18 DIAGNOSIS — E65 ABDOMINAL OBESITY: ICD-10-CM

## 2021-06-18 DIAGNOSIS — I63.412 CEREBROVASCULAR ACCIDENT (CVA) DUE TO EMBOLISM OF LEFT MIDDLE CEREBRAL ARTERY: ICD-10-CM

## 2021-06-18 DIAGNOSIS — E78.00 HYPERCHOLESTEROLEMIA: ICD-10-CM

## 2021-06-18 DIAGNOSIS — R00.2 PALPITATIONS: ICD-10-CM

## 2021-06-18 DIAGNOSIS — J45.50 SEVERE PERSISTENT ASTHMA WITHOUT COMPLICATION: ICD-10-CM

## 2021-06-18 DIAGNOSIS — Q21.10 ASD (ATRIAL SEPTAL DEFECT): ICD-10-CM

## 2021-06-18 DIAGNOSIS — D64.9 ANEMIA, UNSPECIFIED TYPE: ICD-10-CM

## 2021-06-18 LAB
IRON SERPL-MCNC: 72 UG/DL (ref 30–160)
RETICS/RBC NFR AUTO: 1.7 % (ref 0.5–2.5)
SATURATED IRON: 18 % (ref 20–50)
TOTAL IRON BINDING CAPACITY: 403 UG/DL (ref 250–450)
TRANSFERRIN SERPL-MCNC: 272 MG/DL (ref 200–375)

## 2021-06-18 PROCEDURE — 99215 OFFICE O/P EST HI 40 MIN: CPT | Mod: 25,S$GLB,, | Performed by: INTERNAL MEDICINE

## 2021-06-18 PROCEDURE — 82728 ASSAY OF FERRITIN: CPT | Performed by: INTERNAL MEDICINE

## 2021-06-18 PROCEDURE — 93010 ELECTROCARDIOGRAM REPORT: CPT | Mod: S$GLB,,, | Performed by: INTERNAL MEDICINE

## 2021-06-18 PROCEDURE — 93010 EKG 12-LEAD: ICD-10-PCS | Mod: S$GLB,,, | Performed by: INTERNAL MEDICINE

## 2021-06-18 PROCEDURE — 85045 AUTOMATED RETICULOCYTE COUNT: CPT | Performed by: INTERNAL MEDICINE

## 2021-06-18 PROCEDURE — 99215 PR OFFICE/OUTPT VISIT, EST, LEVL V, 40-54 MIN: ICD-10-PCS | Mod: 25,S$GLB,, | Performed by: INTERNAL MEDICINE

## 2021-06-18 PROCEDURE — 36415 COLL VENOUS BLD VENIPUNCTURE: CPT | Performed by: INTERNAL MEDICINE

## 2021-06-18 PROCEDURE — 83540 ASSAY OF IRON: CPT | Performed by: INTERNAL MEDICINE

## 2021-06-18 PROCEDURE — 99999 PR PBB SHADOW E&M-EST. PATIENT-LVL IV: CPT | Mod: PBBFAC,,, | Performed by: INTERNAL MEDICINE

## 2021-06-18 PROCEDURE — 99999 PR PBB SHADOW E&M-EST. PATIENT-LVL IV: ICD-10-PCS | Mod: PBBFAC,,, | Performed by: INTERNAL MEDICINE

## 2021-06-18 RX ORDER — METOPROLOL TARTRATE 50 MG/1
50 TABLET ORAL 2 TIMES DAILY
Qty: 180 TABLET | Refills: 3 | Status: SHIPPED | OUTPATIENT
Start: 2021-06-18 | End: 2022-06-27

## 2021-06-19 LAB — FERRITIN SERPL-MCNC: 35 NG/ML (ref 20–300)

## 2021-06-25 RX ORDER — PROMETHAZINE HYDROCHLORIDE AND DEXTROMETHORPHAN HYDROBROMIDE 6.25; 15 MG/5ML; MG/5ML
5 SYRUP ORAL EVERY 4 HOURS PRN
Qty: 118 ML | Refills: 0 | Status: SHIPPED | OUTPATIENT
Start: 2021-06-25 | End: 2021-07-05

## 2021-07-13 ENCOUNTER — HOSPITAL ENCOUNTER (OUTPATIENT)
Dept: CARDIOLOGY | Facility: HOSPITAL | Age: 56
Discharge: HOME | End: 2021-07-13
Attending: INTERNAL MEDICINE
Payer: COMMERCIAL

## 2021-07-13 VITALS — HEIGHT: 64 IN | WEIGHT: 193 LBS | BODY MASS INDEX: 32.95 KG/M2

## 2021-07-13 DIAGNOSIS — R06.09 DOE (DYSPNEA ON EXERTION): ICD-10-CM

## 2021-07-13 DIAGNOSIS — R00.2 PALPITATIONS: ICD-10-CM

## 2021-07-13 DIAGNOSIS — Q21.10 ASD (ATRIAL SEPTAL DEFECT): ICD-10-CM

## 2021-07-13 DIAGNOSIS — R94.31 NONSPECIFIC ABNORMAL ELECTROCARDIOGRAM (ECG): ICD-10-CM

## 2021-07-13 LAB
AORTIC ROOT ANNULUS: 2.94 CM
AORTIC VALVE CUSP SEPERATION: 2.08 CM
AV INDEX (PROSTH): 0.74
AV MEAN GRADIENT: 3 MMHG
AV PEAK GRADIENT: 5 MMHG
AV VALVE AREA: 2.49 CM2
AV VELOCITY RATIO: 0.65
BSA FOR ECHO PROCEDURE: 1.99 M2
CV ECHO LV RWT: 0.43 CM
DOP CALC AO PEAK VEL: 1.17 M/S
DOP CALC AO VTI: 21.18 CM
DOP CALC LVOT AREA: 3.4 CM2
DOP CALC LVOT DIAMETER: 2.07 CM
DOP CALC LVOT PEAK VEL: 0.76 M/S
DOP CALC LVOT STROKE VOLUME: 52.78 CM3
DOP CALCLVOT PEAK VEL VTI: 15.69 CM
E WAVE DECELERATION TIME: 289.21 MSEC
E/A RATIO: 0.82
E/E' RATIO: 6.67 M/S
ECHO LV POSTERIOR WALL: 1.06 CM (ref 0.6–1.1)
EJECTION FRACTION: 45 %
FRACTIONAL SHORTENING: 19 % (ref 28–44)
INTERVENTRICULAR SEPTUM: 0.81 CM (ref 0.6–1.1)
LA MAJOR: 4.98 CM
LA MINOR: 3.11 CM
LEFT ATRIUM SIZE: 3.83 CM
LEFT ATRIUM VOLUME INDEX MOD: 10.2 ML/M2
LEFT ATRIUM VOLUME MOD: 19.62 CM3
LEFT INTERNAL DIMENSION IN SYSTOLE: 3.96 CM (ref 2.1–4)
LEFT VENTRICLE DIASTOLIC VOLUME INDEX: 58.7 ML/M2
LEFT VENTRICLE DIASTOLIC VOLUME: 113.3 ML
LEFT VENTRICLE MASS INDEX: 84 G/M2
LEFT VENTRICLE SYSTOLIC VOLUME INDEX: 35.4 ML/M2
LEFT VENTRICLE SYSTOLIC VOLUME: 68.35 ML
LEFT VENTRICULAR INTERNAL DIMENSION IN DIASTOLE: 4.91 CM (ref 3.5–6)
LEFT VENTRICULAR MASS: 161.42 G
LV LATERAL E/E' RATIO: 5.71 M/S
LV SEPTAL E/E' RATIO: 8 M/S
MV MEAN GRADIENT: 0 MMHG
MV PEAK A VEL: 0.49 M/S
MV PEAK E VEL: 0.4 M/S
MV PEAK GRADIENT: 2 MMHG
MV STENOSIS PRESSURE HALF TIME: 83.87 MS
MV VALVE AREA P 1/2 METHOD: 2.62 CM2
PISA MRMAX VEL: 0.03 M/S
PISA TR MAX VEL: 1.92 M/S
PV PEAK VELOCITY: 0.68 CM/S
RA MAJOR: 5.22 CM
RIGHT VENTRICULAR END-DIASTOLIC DIMENSION: 2.71 CM
TDI LATERAL: 0.07 M/S
TDI SEPTAL: 0.05 M/S
TDI: 0.06 M/S
TR MAX PG: 15 MMHG

## 2021-07-13 PROCEDURE — 93246 EXT ECG>7D<15D RECORDING: CPT

## 2021-07-13 PROCEDURE — 93306 TTE W/DOPPLER COMPLETE: CPT | Mod: 26,,, | Performed by: INTERNAL MEDICINE

## 2021-07-13 PROCEDURE — 93247 EXT ECG>7D<15D SCAN A/R: CPT

## 2021-07-13 PROCEDURE — 93306 ECHO (CUPID ONLY): ICD-10-PCS | Mod: 26,,, | Performed by: INTERNAL MEDICINE

## 2021-07-13 PROCEDURE — 93248 CV CARDIAC MONITOR - 3-14 DAY ADULT (CUPID ONLY): ICD-10-PCS | Mod: ,,, | Performed by: INTERNAL MEDICINE

## 2021-07-13 PROCEDURE — 93306 TTE W/DOPPLER COMPLETE: CPT

## 2021-07-13 PROCEDURE — 93248 EXT ECG>7D<15D REV&INTERPJ: CPT | Mod: ,,, | Performed by: INTERNAL MEDICINE

## 2021-07-13 PROCEDURE — 93356 MYOCRD STRAIN IMG SPCKL TRCK: CPT | Mod: ,,, | Performed by: INTERNAL MEDICINE

## 2021-07-13 PROCEDURE — 93356 ECHO (CUPID ONLY): ICD-10-PCS | Mod: ,,, | Performed by: INTERNAL MEDICINE

## 2021-07-15 ENCOUNTER — PATIENT OUTREACH (OUTPATIENT)
Dept: ADMINISTRATIVE | Facility: OTHER | Age: 56
End: 2021-07-15

## 2021-07-19 ENCOUNTER — OFFICE VISIT (OUTPATIENT)
Dept: CARDIOLOGY | Facility: CLINIC | Age: 56
End: 2021-07-19
Payer: COMMERCIAL

## 2021-07-19 VITALS
SYSTOLIC BLOOD PRESSURE: 146 MMHG | RESPIRATION RATE: 17 BRPM | HEIGHT: 64 IN | DIASTOLIC BLOOD PRESSURE: 85 MMHG | HEART RATE: 72 BPM | BODY MASS INDEX: 32.55 KG/M2 | OXYGEN SATURATION: 99 % | WEIGHT: 190.63 LBS

## 2021-07-19 DIAGNOSIS — R00.2 PALPITATIONS: ICD-10-CM

## 2021-07-19 DIAGNOSIS — Z91.89 CARDIOVASCULAR RISK FACTOR: ICD-10-CM

## 2021-07-19 DIAGNOSIS — E78.00 HYPERCHOLESTEROLEMIA: ICD-10-CM

## 2021-07-19 DIAGNOSIS — Q21.10 ASD (ATRIAL SEPTAL DEFECT): ICD-10-CM

## 2021-07-19 DIAGNOSIS — I51.9 LV DYSFUNCTION: Primary | ICD-10-CM

## 2021-07-19 DIAGNOSIS — R06.09 DOE (DYSPNEA ON EXERTION): ICD-10-CM

## 2021-07-19 DIAGNOSIS — R53.1 RIGHT SIDED WEAKNESS: ICD-10-CM

## 2021-07-19 DIAGNOSIS — E61.1 IRON DEFICIENCY: ICD-10-CM

## 2021-07-19 DIAGNOSIS — I63.412 CEREBROVASCULAR ACCIDENT (CVA) DUE TO EMBOLISM OF LEFT MIDDLE CEREBRAL ARTERY: ICD-10-CM

## 2021-07-19 PROCEDURE — 99999 PR PBB SHADOW E&M-EST. PATIENT-LVL IV: ICD-10-PCS | Mod: PBBFAC,,, | Performed by: INTERNAL MEDICINE

## 2021-07-19 PROCEDURE — 99999 PR PBB SHADOW E&M-EST. PATIENT-LVL IV: CPT | Mod: PBBFAC,,, | Performed by: INTERNAL MEDICINE

## 2021-07-19 PROCEDURE — 99214 OFFICE O/P EST MOD 30 MIN: CPT | Mod: S$GLB,,, | Performed by: INTERNAL MEDICINE

## 2021-07-19 PROCEDURE — 99214 PR OFFICE/OUTPT VISIT, EST, LEVL IV, 30-39 MIN: ICD-10-PCS | Mod: S$GLB,,, | Performed by: INTERNAL MEDICINE

## 2021-07-19 RX ORDER — MELOXICAM 7.5 MG/1
TABLET ORAL
COMMUNITY
End: 2021-08-16

## 2021-07-19 RX ORDER — FERROUS SULFATE 325(65) MG
325 TABLET ORAL 2 TIMES DAILY
Qty: 60 TABLET | Refills: 11 | Status: SHIPPED | OUTPATIENT
Start: 2021-07-19 | End: 2022-06-27

## 2021-07-19 RX ORDER — LOSARTAN POTASSIUM 25 MG/1
25 TABLET ORAL NIGHTLY
Qty: 30 TABLET | Refills: 11 | Status: SHIPPED | OUTPATIENT
Start: 2021-07-19 | End: 2022-08-18

## 2021-07-27 NOTE — H&P
[address] [facility]  [address], [place], [place]  Phone [phone]  Fax [phone]  PROGRESS NOTE  Patient Name: Jessica Morales : 1966   Treatment/Medical Diagnosis: Acute bilateral low back pain with sciatica [M54.40]   Referral Source: Casey Osorio,*     Date of Initial Visit: 21 Attended Visits: 6 Missed Visits: 2     SUMMARY OF TREATMENT  Pt seen for 6 visits for low back pain. Therapy included therex for strengthening/Maximus extension to centralize LE radicular therapy, manual to improve SI alignment and tissue extensibility, and modalities for pain management. CURRENT STATUS  Pt reports that she is improved significantly 85%. She notes that her pain is overall decreased and since she started doing her exercises before bed she doesn't wake up in the middle of the night in pain and gets more restful sleep. Functional limitations include walking> a couple hours causes an ache in her low back. She notes that when she did that she did her exercises before bed and she didn't have the pain like she used to. Pt denies pain in the last few weeks. Upon reassessment, pt responded + to [facility] extension principle as she no longer has radicular pain in either leg. She presents with level SI this session and improve core/lumbar/hip strength leading to improved tolerance with functional mobility (noted with improved FOTO score). At this time patient requests IND management with updated HEP to trial for 30 days. She will FU in 30 days for final measurements. Other Objective/Functional Measures:   Pelvic assessment: level this session  B hip strength: is grossly a 4+/5 in all planes  Bridge: full AROM 100%  FOTO improved to 83/100 from 70/100 at evaluation     MEASUREMENTS TAKEN AT EVALUATION: 21  Posture:  Lateral Shift:    []? R    []? L      []? +  [x]? -  Kyphosis:        []? Increased   []?  Decreased [facility]  History & Physical    Patient Name: Renée Goff  MRN: [id number]  Admission Date: 2019  Attending Physician: Edy Barahona MD   Primary Care Provider: Thomas Khan MD         Patient information was obtained from patient, spouse/SO and ER records.     Subjective:     Principal Problem:Acute bronchitis    Chief Complaint:   Chief Complaint   Patient presents with    Cough     x 3 days     Wheezing        HPI: Renée Goff is a 54 yo female with PMHx significant for MI and CVA.  She presented to the ED with complaints of worsening SOB and wheezing, as well as cough for 4 days with worsening of symptoms today.  She states she initially started with some postnasal drip, congestion, and itchy ears.  Her symptoms progressed with incessant, thick green sputum producing cough, and wheezing as well as SOB that seems worsened with any exertion.  She reports symptoms improve some with albuterol inhalers in the ED and cough has stopped completely since that time, however she continues with wheeze any SOB.  Associated symptoms include some chest tightness when she can't catch her breath, as well as with coughing and deep breath.  She denies any fever chills at home, but does denote a fever 100.8 at urgent care prior to arrival in ED.  She denies any recent sick contact or any Hx of asthma or bronchitis.  She denies any nausea, vomiting, urinary complaints, or leg swelling.  Other pertinent medical Hx as below:    Past Medical History:   Diagnosis Date    MI (myocardial infarction)     Stroke        Past Surgical History:   Procedure Laterality Date     SECTION      CORONARY ARTERY BYPASS GRAFT         Review of patient's allergies indicates:  No Known Allergies    Current Facility-Administered Medications on File Prior to Encounter   Medication    [COMPLETED] albuterol-ipratropium 2.5 mg-0.5 mg/3 mL nebulizer solution 3 mL     Current  "Outpatient Medications on File Prior to Encounter   Medication Sig    ALPRAZolam (XANAX) 0.5 MG tablet Take 1 tablet (0.5 mg total) by mouth once daily.    amitriptyline (ELAVIL) 10 MG tablet Ninety day supply (Patient taking differently: Take 10 mg by mouth 4 (four) times daily. )    aspirin 81 MG Chew Take 1 tablet (81 mg total) by mouth once daily.    atorvastatin (LIPITOR) 20 MG tablet Take 1 tablet (20 mg total) by mouth once daily.    butalbital-acetaminophen-caffeine -40 mg (FIORICET, ESGIC) -40 mg per tablet Take 1 tablet by mouth every 4 (four) hours as needed for Pain.    hyoscyamine (LEVSIN/SL) 0.125 mg Subl Take 1 tablet by mouth every 6 (six) hours as needed (IBS).     multivitamin (MULTIVITAMIN) per tablet Take 1 tablet by mouth once daily.      pantoprazole (PROTONIX) 40 MG tablet Take 40 mg by mouth once daily.    propranolol (INDERAL) 40 MG tablet Take 1 tablet (40 mg total) by mouth 2 (two) times daily.     Family History     Problem Relation (Age of Onset)    Breast cancer Maternal Grandmother    Heart attack Maternal Grandfather        Tobacco Use    Smoking status: Never Smoker    Smokeless tobacco: Never Used   Substance and Sexual Activity    Alcohol use: No     Alcohol/week: 0.0 oz    Drug use: No    Sexual activity: Yes     Partners: Male     Review of Systems   Constitutional: Negative for activity change, appetite change, chills, fatigue and fever.   HENT: Positive for congestion, ear pain ("itching"), postnasal drip, sore throat and trouble swallowing.    Eyes: Negative for photophobia and visual disturbance.   Respiratory: Positive for cough, chest tightness, shortness of breath and wheezing.    Cardiovascular: Negative for chest pain, palpitations and leg swelling.   Gastrointestinal: Negative for abdominal distention, abdominal pain, diarrhea, nausea and vomiting.   Genitourinary: Negative for difficulty urinating, dysuria, flank pain and hematuria. " [x]?  WNL  Lordosis:         []? Increased   []? Decreased   [x]? WNL  Pelvic symmetry: []? WNL       []? Other:  Gait:                [x]? Normal        []? Abnormal:     Active Movements: []? N/A   []? Too acute   []? Other:  ROM % AROM % PROM Comments:pain, area   Forward flexion 40-60 Finger tips 12 in    Mild increase in l/s pain    Extension 20-30 50%   decrease   SB right 20-30 75%       SB left 20-30 75%   Mild increase   Rotation right 5-10 WNL        Rotation left 5-10 WNL   tightness      Repeated Movements : EDU: decreased sx, RFIS: increases sx, static positioning: KAIN  Neuro Screen [x]? WNL  Dural Mobility:  SLR Sitting:     [x]? R    [x]? L    []? +    [x]? -  @ (degrees):           Supine:    []? R    []? L    []? +    []? -  @ (degrees):   Slump Test:     []? R    [x]? L    [x]? +    []? -  @ (degrees): end range  Prone Knee Bend:      [x]? R    [x]? L    [x]? +    []? -  mild  Palpation  []? Min  [x]? Mod  []? Severe    Location: L piriformis mm , L4, L5,S1 PA glides TTP, L QL mm  Stabilization Tests  Multifidus Test  Strength: glut max: B 4,   Special Tests  Lumbar:  Quadrant:               [x]? Pos  []? Neg   []? Flex  [x]? Ext (L)  Sacroilliac: (L LE slightly shorter, negative long sit test. Tightness in L QL mm )             Hip:            Enma Pat:              []? R    [x]? L    [x]? +    []? - mild                          Piriformis:        []? R    [x]? L    [x]? +    []? -           Deficits:     Eron's: []? R    []? L    []? +    [x]? -                           Homero:          [x]? R    [x]? L    [x]? +    []? - very mild                          Hamstrings 90/90: mod tightness B                          Gastrocsoleus (to neutral): Right:       Left:                                          Goal/Measure of Progress Goal Met? · Short Term Goals: To be accomplished in  2  weeks:  1.  Patient will be compliant with HEP for sx management to address the above listed deficits.  Pt reports   Musculoskeletal: Negative for arthralgias and myalgias.   Skin: Negative for color change.   Neurological: Negative for dizziness, weakness and headaches.   Psychiatric/Behavioral: Negative for confusion. The patient is not nervous/anxious.      Objective:     Vital Signs (Most Recent):  Temp: 98.4 °F (36.9 °C) (05/20/19 1441)  Pulse: 86 (05/20/19 2031)  Resp: 18 (05/20/19 1904)  BP: 124/68 (05/20/19 2031)  SpO2: 95 % (05/20/19 2032) Vital Signs (24h Range):  Temp:  [98.4 °F (36.9 °C)-100.8 °F (38.2 °C)] 98.4 °F (36.9 °C)  Pulse:  [] 86  Resp:  [18-22] 18  SpO2:  [92 %-98 %] 95 %  BP: (113-129)/(67-86) 124/68     Weight: 78.5 kg (173 lb)  Body mass index is 29.7 kg/m².    Physical Exam   Constitutional: She is oriented to person, place, and time. She appears well-developed and well-nourished. No distress.   Sounds congested, raspy.   HENT:   Head: Normocephalic and atraumatic.   Mouth/Throat: Oropharynx is clear and moist.   Ear canals occluded with hard wax   Eyes: Pupils are equal, round, and reactive to light. Conjunctivae and EOM are normal.   Neck: Normal range of motion. Neck supple. No thyromegaly present.   Cardiovascular: Normal rate, regular rhythm, normal heart sounds and intact distal pulses.   No murmur heard.  Pulmonary/Chest: Effort normal. No stridor. No respiratory distress. She has wheezes (diffuse expiratory wheezing). She has no rales. She exhibits no tenderness.   Abdominal: Soft. Bowel sounds are normal. She exhibits no distension. There is no tenderness.   Musculoskeletal: Normal range of motion. She exhibits no edema or tenderness.   Neurological: She is alert and oriented to person, place, and time. No cranial nerve deficit.   Skin: Skin is warm and dry. Capillary refill takes less than 2 seconds. No erythema.   Psychiatric: She has a normal mood and affect. Her behavior is normal. Judgment and thought content normal.         CRANIAL NERVES     CN III, IV, VI   Pupils are equal,  compliance 7/27/21  · Long Term Goals: To be accomplished in  8-10  treatments:  1. Patient to be independent & compliant with HEP in preparation for D/C. Progressing, updated HEP provided 6/29/21   2. Patient to increase FOTO score to 76 indicating improved functional abilities and QOL. Met 7/27/21  3. Patient to report avg pain <= to 2 to facilitate fwd bending chores and reduced sleep disturbances. Met 7/27/21  4. Patient to report 75% reduction in post gluteal pain and sx, indicting centralization or decreased nerve irritations. Met 7/27/21  5. Patient to be educated in and demo proper lifting techniques to reduce stress to l/s. Met 7/27/21  6. New Goals to be achieved in __4__  weeks:  1. Pt will continue to maintain 0/10 low back pain to return to all previous level activities without pain. RECOMMENDATIONS  Pt to trial self-management for 30 days, will FU for 1 visit for DC   If you have any questions/comments please contact us directly at [phone]. Thank you for allowing us to assist in the care of your patient. Therapist Signature: Jerome Cherry DPT Date: 7/27/2021     Time: 1:02 PM   NOTE TO PHYSICIAN:  PLEASE COMPLETE THE ORDERS BELOW AND FAX TO   [facility]: [phone]. If you are unable to process this request in 24 hours please contact our office: [phone].    ___ I have read the above report and request that my patient continue as recommended.   ___ I have read the above report and request that my patient continue therapy with the following changes/special instructions:_________________________________________________________   ___ I have read the above report and request that my patient be discharged from therapy.      Physician Signature:        Date:       Time:    Tawnya Phan round, and reactive to light.  Extraocular motions are normal.        Significant Labs:   CBC:   Recent Labs   Lab 05/20/19  1644   WBC 9.50   HGB 12.1   HCT 36.4*        CMP:   Recent Labs   Lab 05/20/19  1644      K 4.4      CO2 26   *   BUN 10   CREATININE 0.8   CALCIUM 9.9   PROT 7.0   ALBUMIN 3.7   BILITOT 0.3   ALKPHOS 111   AST 22   ALT 16   ANIONGAP 10   EGFRNONAA >60     Cardiac Markers:   Recent Labs   Lab 05/20/19  1644   BNP 92     Troponin:   Recent Labs   Lab 05/20/19  1644   TROPONINI <0.006     TSH:   Recent Labs   Lab 01/31/19  1206   TSH 2.115       Significant Imaging:  CXR: No acute cardiopulmonary disease.    CTA Chest: Status post CABG. Status post sternotomy. No evidence of a pulmonary embolus. Adenopathy as above.    Assessment/Plan:     * Acute bronchitis  Associated worsening SOB and significant wheezing.  Remains quite symptomatic despite treatment in ED.  Will admit for continued scheduled bronchodilators, oral steroids, will initiate oral azithromycin in light of fever in case this is bacterial in etiology.  Utilize oxygen to maintain sats greater than 92%.  Will consult with pulmonology for additional evaluation and management.      Mediastinal adenopathy  [facility] read mentions multiple areas of mediastinal adenopathy. ?  Reactive to current illness.  Will consult pulmonology-Pt may need follow-up CT to ensure resolution versus further evaluation.  Appreciate input.      Eosinophilia  Established problem.  Eosinophil count remains elevated.  ? eosinophilic related respiratory issue.  Follow CBC.  Pt on steroids at this time.      Hypercholesterolemia, baseline .6  Chronic, continue statin therapy.      Migraine  Chronic, currently controlled.  Continue propanolol, as well as Elavil.  Utilize Fioricet as needed.  Monitor clinically.      Perennial sinusitis  Established issue, possibly triggered bronchitis.  Oral azithromycin, steroids.      KIRA (generalized  anxiety disorder)  Chronic, continue Xanax as needed as per home dose.        VTE Risk Mitigation (From admission, onward)        Ordered     IP VTE LOW RISK PATIENT  Once      05/20/19 2143     Place sequential compression device  Until discontinued      05/20/19 2143     Place ELISSA hose  Until discontinued      05/20/19 2143             Brit Gaytan NP  Department of Hospital Medicine   [facility]

## 2021-08-16 ENCOUNTER — OFFICE VISIT (OUTPATIENT)
Dept: CARDIOLOGY | Facility: CLINIC | Age: 56
End: 2021-08-16
Payer: COMMERCIAL

## 2021-08-16 VITALS
HEIGHT: 64 IN | DIASTOLIC BLOOD PRESSURE: 59 MMHG | RESPIRATION RATE: 17 BRPM | HEART RATE: 68 BPM | OXYGEN SATURATION: 100 % | SYSTOLIC BLOOD PRESSURE: 101 MMHG | BODY MASS INDEX: 32.95 KG/M2 | WEIGHT: 193 LBS

## 2021-08-16 DIAGNOSIS — R53.1 RIGHT SIDED WEAKNESS: ICD-10-CM

## 2021-08-16 DIAGNOSIS — Z91.89 CARDIOVASCULAR RISK FACTOR: ICD-10-CM

## 2021-08-16 DIAGNOSIS — R06.2 BILATERAL WHEEZING: Primary | ICD-10-CM

## 2021-08-16 DIAGNOSIS — R06.09 DOE (DYSPNEA ON EXERTION): ICD-10-CM

## 2021-08-16 DIAGNOSIS — R53.82 CHRONIC FATIGUE: ICD-10-CM

## 2021-08-16 PROCEDURE — 99214 PR OFFICE/OUTPT VISIT, EST, LEVL IV, 30-39 MIN: ICD-10-PCS | Mod: S$GLB,,, | Performed by: INTERNAL MEDICINE

## 2021-08-16 PROCEDURE — 99999 PR PBB SHADOW E&M-EST. PATIENT-LVL IV: CPT | Mod: PBBFAC,,, | Performed by: INTERNAL MEDICINE

## 2021-08-16 PROCEDURE — 99214 OFFICE O/P EST MOD 30 MIN: CPT | Mod: S$GLB,,, | Performed by: INTERNAL MEDICINE

## 2021-08-16 PROCEDURE — 99999 PR PBB SHADOW E&M-EST. PATIENT-LVL IV: ICD-10-PCS | Mod: PBBFAC,,, | Performed by: INTERNAL MEDICINE

## 2021-09-20 DIAGNOSIS — M54.50 ACUTE BILATERAL LOW BACK PAIN WITHOUT SCIATICA: Primary | ICD-10-CM

## 2021-09-20 RX ORDER — CYCLOBENZAPRINE HCL 10 MG
10 TABLET ORAL 3 TIMES DAILY PRN
Qty: 12 TABLET | Refills: 0 | Status: SHIPPED | OUTPATIENT
Start: 2021-09-20 | End: 2021-09-21 | Stop reason: SDUPTHER

## 2021-09-21 RX ORDER — CYCLOBENZAPRINE HCL 10 MG
10 TABLET ORAL 3 TIMES DAILY PRN
Qty: 12 TABLET | Refills: 0 | Status: SHIPPED | OUTPATIENT
Start: 2021-09-21 | End: 2021-09-25

## 2021-10-05 ENCOUNTER — TELEPHONE (OUTPATIENT)
Dept: INTERNAL MEDICINE | Facility: CLINIC | Age: 56
End: 2021-10-05

## 2021-10-05 ENCOUNTER — PATIENT MESSAGE (OUTPATIENT)
Dept: ADMINISTRATIVE | Facility: HOSPITAL | Age: 56
End: 2021-10-05

## 2021-10-06 ENCOUNTER — OFFICE VISIT (OUTPATIENT)
Dept: INTERNAL MEDICINE | Facility: CLINIC | Age: 56
End: 2021-10-06
Attending: FAMILY MEDICINE
Payer: COMMERCIAL

## 2021-10-06 VITALS
DIASTOLIC BLOOD PRESSURE: 70 MMHG | WEIGHT: 186.75 LBS | SYSTOLIC BLOOD PRESSURE: 110 MMHG | OXYGEN SATURATION: 100 % | BODY MASS INDEX: 31.88 KG/M2 | HEIGHT: 64 IN | HEART RATE: 63 BPM

## 2021-10-06 DIAGNOSIS — E78.00 HYPERCHOLESTEROLEMIA: ICD-10-CM

## 2021-10-06 DIAGNOSIS — B02.9 HERPES ZOSTER WITHOUT COMPLICATION: Primary | ICD-10-CM

## 2021-10-06 PROCEDURE — 99999 PR PBB SHADOW E&M-EST. PATIENT-LVL IV: ICD-10-PCS | Mod: PBBFAC,,, | Performed by: FAMILY MEDICINE

## 2021-10-06 PROCEDURE — 99214 PR OFFICE/OUTPT VISIT, EST, LEVL IV, 30-39 MIN: ICD-10-PCS | Mod: S$GLB,,, | Performed by: FAMILY MEDICINE

## 2021-10-06 PROCEDURE — 99999 PR PBB SHADOW E&M-EST. PATIENT-LVL IV: CPT | Mod: PBBFAC,,, | Performed by: FAMILY MEDICINE

## 2021-10-06 PROCEDURE — 99214 OFFICE O/P EST MOD 30 MIN: CPT | Mod: S$GLB,,, | Performed by: FAMILY MEDICINE

## 2021-10-06 RX ORDER — HYDROCODONE BITARTRATE AND ACETAMINOPHEN 5; 325 MG/1; MG/1
1 TABLET ORAL EVERY 8 HOURS PRN
Qty: 15 TABLET | Refills: 0 | Status: SHIPPED | OUTPATIENT
Start: 2021-10-06 | End: 2021-10-18 | Stop reason: SDUPTHER

## 2021-10-06 RX ORDER — VALACYCLOVIR HYDROCHLORIDE 1 G/1
1000 TABLET, FILM COATED ORAL 3 TIMES DAILY
Qty: 21 TABLET | Refills: 0 | Status: SHIPPED | OUTPATIENT
Start: 2021-10-06 | End: 2022-10-24

## 2021-10-07 ENCOUNTER — TELEPHONE (OUTPATIENT)
Dept: INTERNAL MEDICINE | Facility: CLINIC | Age: 56
End: 2021-10-07

## 2021-10-13 ENCOUNTER — TELEPHONE (OUTPATIENT)
Dept: INTERNAL MEDICINE | Facility: CLINIC | Age: 56
End: 2021-10-13

## 2021-10-13 RX ORDER — MUPIROCIN 20 MG/G
OINTMENT TOPICAL 3 TIMES DAILY
Qty: 30 G | Refills: 3 | Status: SHIPPED | OUTPATIENT
Start: 2021-10-13 | End: 2021-10-23

## 2021-10-15 DIAGNOSIS — B02.9 HERPES ZOSTER WITHOUT COMPLICATION: Primary | ICD-10-CM

## 2021-10-18 DIAGNOSIS — B02.9 HERPES ZOSTER WITHOUT COMPLICATION: ICD-10-CM

## 2021-10-18 RX ORDER — HYDROCODONE BITARTRATE AND ACETAMINOPHEN 5; 325 MG/1; MG/1
1 TABLET ORAL EVERY 8 HOURS PRN
Qty: 15 TABLET | Refills: 0 | Status: SHIPPED | OUTPATIENT
Start: 2021-10-18 | End: 2022-10-24

## 2021-10-18 RX ORDER — HYDROCODONE BITARTRATE AND ACETAMINOPHEN 5; 325 MG/1; MG/1
1 TABLET ORAL EVERY 6 HOURS PRN
Qty: 15 TABLET | Refills: 0 | Status: SHIPPED | OUTPATIENT
Start: 2021-10-18 | End: 2021-10-20 | Stop reason: SDUPTHER

## 2021-10-19 ENCOUNTER — TELEPHONE (OUTPATIENT)
Dept: INTERNAL MEDICINE | Facility: CLINIC | Age: 56
End: 2021-10-19

## 2021-10-20 DIAGNOSIS — B02.9 HERPES ZOSTER WITHOUT COMPLICATION: ICD-10-CM

## 2021-10-20 RX ORDER — HYDROCODONE BITARTRATE AND ACETAMINOPHEN 5; 325 MG/1; MG/1
1 TABLET ORAL EVERY 8 HOURS PRN
Qty: 15 TABLET | Refills: 0 | Status: SHIPPED | OUTPATIENT
Start: 2021-10-20 | End: 2022-10-24

## 2022-01-18 ENCOUNTER — OFFICE VISIT (OUTPATIENT)
Dept: INTERNAL MEDICINE | Facility: CLINIC | Age: 57
End: 2022-01-18
Payer: COMMERCIAL

## 2022-01-18 VITALS
SYSTOLIC BLOOD PRESSURE: 110 MMHG | BODY MASS INDEX: 30.88 KG/M2 | WEIGHT: 179.88 LBS | HEART RATE: 64 BPM | DIASTOLIC BLOOD PRESSURE: 84 MMHG | OXYGEN SATURATION: 98 %

## 2022-01-18 DIAGNOSIS — S39.012A STRAIN OF LUMBAR REGION, INITIAL ENCOUNTER: Primary | ICD-10-CM

## 2022-01-18 DIAGNOSIS — M62.830 LUMBAR PARASPINAL MUSCLE SPASM: ICD-10-CM

## 2022-01-18 PROCEDURE — 99999 PR PBB SHADOW E&M-EST. PATIENT-LVL IV: CPT | Mod: PBBFAC,,, | Performed by: PHYSICIAN ASSISTANT

## 2022-01-18 PROCEDURE — 96372 THER/PROPH/DIAG INJ SC/IM: CPT | Mod: S$GLB,,, | Performed by: PHYSICIAN ASSISTANT

## 2022-01-18 PROCEDURE — 96372 PR INJECTION,THERAP/PROPH/DIAG2ST, IM OR SUBCUT: ICD-10-PCS | Mod: S$GLB,,, | Performed by: PHYSICIAN ASSISTANT

## 2022-01-18 PROCEDURE — 99214 OFFICE O/P EST MOD 30 MIN: CPT | Mod: 25,S$GLB,, | Performed by: PHYSICIAN ASSISTANT

## 2022-01-18 PROCEDURE — 99999 PR PBB SHADOW E&M-EST. PATIENT-LVL IV: ICD-10-PCS | Mod: PBBFAC,,, | Performed by: PHYSICIAN ASSISTANT

## 2022-01-18 PROCEDURE — 99214 PR OFFICE/OUTPT VISIT, EST, LEVL IV, 30-39 MIN: ICD-10-PCS | Mod: 25,S$GLB,, | Performed by: PHYSICIAN ASSISTANT

## 2022-01-18 RX ORDER — TRIAMCINOLONE ACETONIDE 40 MG/ML
60 INJECTION, SUSPENSION INTRA-ARTICULAR; INTRAMUSCULAR ONCE
Status: COMPLETED | OUTPATIENT
Start: 2022-01-18 | End: 2022-01-18

## 2022-01-18 RX ORDER — CYCLOBENZAPRINE HCL 10 MG
10 TABLET ORAL 3 TIMES DAILY PRN
Qty: 15 TABLET | Refills: 0 | Status: SHIPPED | OUTPATIENT
Start: 2022-01-18 | End: 2022-01-28

## 2022-01-18 RX ADMIN — TRIAMCINOLONE ACETONIDE 60 MG: 40 INJECTION, SUSPENSION INTRA-ARTICULAR; INTRAMUSCULAR at 11:01

## 2022-01-18 NOTE — LETTER
January 18, 2022      [facility]  [address]  [facility] [address]  Phone: [phone]  Fax: [phone]       Patient: Renée Goff   YOB: 1965  Date of Visit: 01/18/2022    To Whom It May Concern:    Mark Goff  was at [facility] on 01/18/2022. The patient may return to work on 1/20/2022  with no restrictions. If you have any questions or concerns, or if I can be of further assistance, please do not hesitate to contact me.     Sincerely,            Michelle Loving PA-C

## 2022-01-18 NOTE — LETTER
January 18, 2022      [facility]  [address]  [facility] [address]  Phone: [phone]  Fax: [phone]       Patient: Renée Goff   YOB: 1965  Date of Visit: 01/18/2022    To Whom It May Concern:    Mark Goff  was at [facility] on 01/18/2022. Her symptoms started on 1/14/2022. The patient may return to work on 1/20/2022 with no restrictions. If you have any questions or concerns, or if I can be of further assistance, please do not hesitate to contact me.    Sincerely,        Michelle Loving PA-C

## 2022-01-18 NOTE — PROGRESS NOTES
INTERNAL MEDICINE URGENT VISIT NOTE    CHIEF COMPLAINT     Chief Complaint   Patient presents with    Back Pain       HPI     Renée Goff is a 56 y.o. female who presents for an urgent visit today.    PCP is Dr. Khan, patient is new to me.     Patient presents with complaints of back spasms that started on Friday -she thinks she may have bent over the wrong way. Sudden onset -same fashion as usual back pain. Symptoms are worse with walking and sitting. She denies bladder or bowel changes. She denies dysuria, numbness or tingling radiating down legs. She denies fever, chills, nausea, vomiting. She has been taking occasional doses of flexeril which she admits usually works. In the past she has gotten steroid IM injection to help with similar symptoms.       Past Medical History:  Past Medical History:   Diagnosis Date    MI (myocardial infarction)     Stroke        Home Medications:  Prior to Admission medications    Medication Sig Start Date End Date Taking? Authorizing Provider   albuterol (PROVENTIL/VENTOLIN HFA) 90 mcg/actuation inhaler 2 puffs every 4 hours as needed for cough, wheeze, or shortness of breath 5/22/19  Yes Sage Soto MD   ALPRAZolam (XANAX) 0.5 MG tablet TAKE 1 TABLET (0.5 MG TOTAL) BY MOUTH 2 (TWO) TIMES DAILY AS NEEDED FOR ANXIETY. 11/22/21  Yes Thomas Khan MD   amitriptyline (ELAVIL) 10 MG tablet TAKE 4 TABLETS BY MOUTH EVERY EVENING 5/31/21  Yes Emma Mackey MD   aspirin 81 MG Chew Take 1 tablet (81 mg total) by mouth once daily. 5/7/19  Yes Giacomo Chavira MD   atorvastatin (LIPITOR) 20 MG tablet TAKE 1 TABLET BY MOUTH EVERY DAY 12/14/20  Yes Thomas Khan MD   butalbital-acetaminophen-caffeine -40 mg (FIORICET, ESGIC) -40 mg per tablet Take 1 tablet by mouth every 4 (four) hours as needed for Pain. 11/17/20  Yes Thomas Khan MD   ferrous sulfate (FEOSOL) 325 mg (65 mg iron) Tab tablet Take 1 tablet (325 mg total) by mouth 2  (two) times daily. Try on empty stomach first 7/19/21  Yes Giacomo Chavira MD   fluticasone propionate (FLONASE) 50 mcg/actuation nasal spray 2 sprays (100 mcg total) by Each Nostril route once daily.  Patient taking differently: 2 sprays by Each Nostril route daily as needed. 5/11/20  Yes Sage Soto MD   HYDROcodone-acetaminophen (NORCO) 5-325 mg per tablet Take 1 tablet by mouth every 8 (eight) hours as needed for Pain. 10/18/21  Yes Thomas Khan MD   HYDROcodone-acetaminophen (NORCO) 5-325 mg per tablet Take 1 tablet by mouth every 8 (eight) hours as needed for Pain. 10/20/21  Yes Thomas Khan MD   losartan (COZAAR) 25 MG tablet Take 1 tablet (25 mg total) by mouth every evening. 7/19/21 7/19/22 Yes Giacomo Chavira MD   metoprolol tartrate (LOPRESSOR) 50 MG tablet Take 1 tablet (50 mg total) by mouth 2 (two) times daily. 6/18/21 6/18/22 Yes Giacomo Chavira MD   montelukast (SINGULAIR) 10 mg tablet TAKE 1 TABLET BY MOUTH EVERY DAY 8/2/21  Yes Thomas Khan MD   multivitamin (THERAGRAN) per tablet Take 1 tablet by mouth once daily.   Yes Historical Provider   pantoprazole (PROTONIX) 40 MG tablet TAKE 1 TABLET BY MOUTH EVERY DAY 12/6/21  Yes Thomas Khan MD   valACYclovir (VALTREX) 1000 MG tablet Take 1 tablet (1,000 mg total) by mouth 3 (three) times daily. for 7 days 10/6/21 10/13/21  Thomas Khan MD       Review of Systems:  Review of Systems   Constitutional: Negative for chills and fever.   HENT: Negative for sore throat and trouble swallowing.    Eyes: Negative for visual disturbance.   Respiratory: Negative for cough and shortness of breath.    Cardiovascular: Negative for chest pain.   Gastrointestinal: Negative for abdominal pain, constipation, diarrhea, nausea and vomiting.   Genitourinary: Negative for dysuria and flank pain.   Musculoskeletal: Positive for back pain. Negative for neck pain and neck stiffness.   Skin: Negative for rash.   Neurological:  Negative for dizziness, syncope, weakness and headaches.   Psychiatric/Behavioral: Negative for confusion.       Health Maintainence:   Immunizations:  Health Maintenance       Date Due Completion Date    HIV Screening Never done ---    Cervical Cancer Screening Never done ---    Influenza Vaccine (1) Never done ---    COVID-19 Vaccine (3 - Booster for Moderna series) 11/05/2021 5/5/2021    Shingles Vaccine (2 of 2) 11/27/2021 10/2/2021    Mammogram 05/05/2022 5/5/2021    TETANUS VACCINE 08/23/2022 8/23/2012    High Dose Statin 10/06/2022 10/6/2021    Colorectal Cancer Screening 03/14/2024 3/14/2019    Lipid Panel 07/14/2025 7/14/2020           PHYSICAL EXAM     /84 (BP Location: Right arm, Patient Position: Sitting)   Pulse 64   Wt 81.6 kg (179 lb 14.3 oz)   LMP 10/12/2012   SpO2 98%   BMI 30.88 kg/m²     Physical Exam  Vitals and nursing note reviewed.   Constitutional:       Appearance: Normal appearance.      Comments: Healthy appearing female in NAD or apparent pain. She makes good eye contact, speaks in clear full sentences and ambulates with ease.      HENT:      Head: Normocephalic and atraumatic.      Nose: Nose normal.      Mouth/Throat:      Pharynx: Oropharynx is clear.   Eyes:      Conjunctiva/sclera: Conjunctivae normal.   Cardiovascular:      Rate and Rhythm: Normal rate and regular rhythm.      Pulses: Normal pulses.   Pulmonary:      Effort: No respiratory distress.   Abdominal:      Tenderness: There is no abdominal tenderness.   Musculoskeletal:         General: Normal range of motion.      Cervical back: No rigidity.      Comments: No C T or L midline bony TTP crepitus or step-offs.   No overlying skin changes    Skin:     General: Skin is warm and dry.      Capillary Refill: Capillary refill takes less than 2 seconds.      Findings: No rash.   Neurological:      General: No focal deficit present.      Mental Status: She is alert.      Gait: Gait normal.   Psychiatric:         Mood and  Affect: Mood normal.         LABS     Lab Results   Component Value Date    HGBA1C 5.7 (H) 07/14/2020     CMP  Sodium   Date Value Ref Range Status   07/14/2020 139 136 - 145 mmol/L Final     Potassium   Date Value Ref Range Status   07/14/2020 3.9 3.5 - 5.1 mmol/L Final     Chloride   Date Value Ref Range Status   07/14/2020 103 95 - 110 mmol/L Final     CO2   Date Value Ref Range Status   07/14/2020 27 23 - 29 mmol/L Final     Glucose   Date Value Ref Range Status   07/14/2020 97 70 - 110 mg/dL Final     BUN   Date Value Ref Range Status   07/14/2020 8 6 - 20 mg/dL Final     Creatinine   Date Value Ref Range Status   07/14/2020 0.9 0.5 - 1.4 mg/dL Final   11/14/2012 0.8 0.5 - 1.4 mg/dL Final     Calcium   Date Value Ref Range Status   07/14/2020 9.6 8.7 - 10.5 mg/dL Final   11/14/2012 9.6 8.7 - 10.5 mg/dL Final     Total Protein   Date Value Ref Range Status   07/14/2020 7.2 6.0 - 8.4 g/dL Final     Albumin   Date Value Ref Range Status   07/14/2020 3.9 3.5 - 5.2 g/dL Final     Total Bilirubin   Date Value Ref Range Status   07/14/2020 0.5 0.1 - 1.0 mg/dL Final     Comment:     For infants and newborns, interpretation of results should be based  on gestational age, weight and in agreement with clinical  observations.  Premature Infant recommended reference ranges:  Up to 24 hours.............<8.0 mg/dL  Up to 48 hours............<12.0 mg/dL  3-5 days..................<15.0 mg/dL  6-29 days.................<15.0 mg/dL       Alkaline Phosphatase   Date Value Ref Range Status   07/14/2020 121 55 - 135 U/L Final     AST   Date Value Ref Range Status   07/14/2020 29 10 - 40 U/L Final     ALT   Date Value Ref Range Status   07/14/2020 17 10 - 44 U/L Final     Anion Gap   Date Value Ref Range Status   07/14/2020 9 8 - 16 mmol/L Final   11/14/2012 13 5 - 15 meq/L Final     eGFR if    Date Value Ref Range Status   07/14/2020 >60 >60 mL/min/1.73 m^2 Final     eGFR if non    Date Value Ref  Range Status   07/14/2020 >60 >60 mL/min/1.73 m^2 Final     Comment:     Calculation used to obtain the estimated glomerular filtration  rate (eGFR) is the CKD-EPI equation.        Lab Results   Component Value Date    WBC 6.20 07/14/2020    HGB 11.1 (L) 07/14/2020    HCT 34.9 (L) 07/14/2020    MCV 91 07/14/2020     07/14/2020     Lab Results   Component Value Date    CHOL 145 07/14/2020    CHOL 243 (H) 01/31/2019    CHOL 127 11/09/2017     Lab Results   Component Value Date    HDL 59 07/14/2020    HDL 59 01/31/2019    HDL 41 11/09/2017     Lab Results   Component Value Date    LDLCALC 68.4 07/14/2020    LDLCALC 161.6 (H) 01/31/2019    LDLCALC 63.6 11/09/2017     Lab Results   Component Value Date    TRIG 88 07/14/2020    TRIG 112 01/31/2019    TRIG 112 11/09/2017     Lab Results   Component Value Date    CHOLHDL 40.7 07/14/2020    CHOLHDL 24.3 01/31/2019    CHOLHDL 32.3 11/09/2017     Lab Results   Component Value Date    TSH 1.135 07/14/2020       ASSESSMENT/PLAN     Renée Goff is a 56 y.o. female     Renée MORENO was seen today for back pain. There is no clinical concern for vertebral fx, cord compression, spinal infection. Will treat with IM steroids and refill flexeril. ED prompts. Return to work note provided.     Diagnoses and all orders for this visit:    Strain of lumbar region, initial encounter    Lumbar paraspinal muscle spasm    Other orders  -     cyclobenzaprine (FLEXERIL) 10 MG tablet; Take 1 tablet (10 mg total) by mouth 3 (three) times daily as needed for Muscle spasms.  -     triamcinolone acetonide injection 60 mg      RTC with PCP as previously planned, sooner if needed.     Michelle Loving PA-C

## 2022-01-18 NOTE — PROGRESS NOTES
"Per order, patient to receive 1.5 mL( 60 mg total )  of Kenalog (triamcinolone acetonide) 40mg/mL. The area of injection was palpated using the medial fold and the iliac crest as anatomical landmarks. Patient was advised to relax the muscle. The area was cleaned with alcohol and allowed to dry. Using a 22g 1.5" needle, 1.5 mL of Kenalog (triamcinolone acetonide) 40mg/mL was placed intramuscularly into the Left  upper outer gluteal quadrant. Patient experienced no complications and was discharged in stable condition. Kenalog Lot: [id number] Exp: April 2023      "

## 2022-01-26 DIAGNOSIS — G43.009 MIGRAINE WITHOUT AURA AND WITHOUT STATUS MIGRAINOSUS, NOT INTRACTABLE: ICD-10-CM

## 2022-01-27 RX ORDER — ALPRAZOLAM 0.5 MG/1
TABLET ORAL
Qty: 60 TABLET | Refills: 0 | Status: SHIPPED | OUTPATIENT
Start: 2022-01-27 | End: 2022-03-28

## 2022-01-27 NOTE — TELEPHONE ENCOUNTER
No new care gaps identified.  Powered by [employer]. Reference number: [id number].   1/26/2022 6:27:05 PM CST

## 2022-02-24 DIAGNOSIS — E78.00 HYPERCHOLESTEROLEMIA: ICD-10-CM

## 2022-02-24 RX ORDER — ATORVASTATIN CALCIUM 20 MG/1
TABLET, FILM COATED ORAL
Qty: 90 TABLET | Refills: 3 | Status: SHIPPED | OUTPATIENT
Start: 2022-02-24 | End: 2023-01-03

## 2022-02-24 NOTE — TELEPHONE ENCOUNTER
Care Due:                  Date            Visit Type   Department     Provider  --------------------------------------------------------------------------------                                EP -                              PRIMARY      [facility] INTERNAL  Thomas Rodriguez  Last Visit: 10-      [facility] ([facility]   [facility]  Next Visit: None Scheduled  None         None Found                                                            Last  Test          Frequency    Reason                     Performed    Due Date  --------------------------------------------------------------------------------    CMP.........  12 months..  atorvastatin.............  07- 07-    Lipid Panel.  12 months..  atorvastatin.............  07-   07-    Powered by [employer]ones by [employer]. Reference number: [id number].   2/24/2022 1:25:02 AM CST

## 2022-03-03 DIAGNOSIS — J20.9 ACUTE BRONCHITIS, UNSPECIFIED ORGANISM: Primary | ICD-10-CM

## 2022-03-03 RX ORDER — FLUTICASONE PROPIONATE 50 MCG
2 SPRAY, SUSPENSION (ML) NASAL DAILY
Qty: 16 G | Refills: 11 | Status: SHIPPED | OUTPATIENT
Start: 2022-03-03 | End: 2023-09-29

## 2022-03-03 NOTE — TELEPHONE ENCOUNTER
----- Message from Joseline Stern sent at 3/3/2022 12:59 PM CST -----  Regarding: Refill request  Type:  RX Refill Request    Who Called: DENTON REYES [[id number]]    Refill or New Rx: Refill     RX Name and Strength: fluticasone propionate (FLONASE) 50 mcg/actuation nasal spray    How is the patient currently taking it? (ex. 1XDay) 2xday     Is this a 30 day or 90 day RX: 30 days     Preferred Pharmacy with phone number: [facility]/PHARMACY #[id number] - [place], [place] - [address]    Local or Mail Order: local     Ordering Provider:Bill Gipson Call Back Number: [phone]    Additional Information:

## 2022-03-03 NOTE — TELEPHONE ENCOUNTER
No new care gaps identified.  Powered by [employer] by [employer]. Reference number: [id number].   3/03/2022 1:12:11 PM CST

## 2022-03-10 NOTE — TELEPHONE ENCOUNTER
Pt stated she received a call from the office, but no message was left. Pt was making sure her 2 pm appointment with  was still on. Informed pt her appointment was still scheduled. Pt verbalized understanding    Bilobed Flap Text: The defect edges were debeveled with a #15 scalpel blade.  Given the location of the defect and the proximity to free margins a bilobe flap was deemed most appropriate.  Using a sterile surgical marker, an appropriate bilobe flap drawn around the defect.    The area thus outlined was incised deep to adipose tissue with a #15 scalpel blade.  The skin margins were undermined to an appropriate distance in all directions utilizing iris scissors.

## 2022-03-27 DIAGNOSIS — G43.009 MIGRAINE WITHOUT AURA AND WITHOUT STATUS MIGRAINOSUS, NOT INTRACTABLE: ICD-10-CM

## 2022-03-27 NOTE — TELEPHONE ENCOUNTER
No new care gaps identified.  Powered by [employer] by [employer]. Reference number: [id number].   3/27/2022 10:11:27 AM CDT

## 2022-03-28 ENCOUNTER — TELEPHONE (OUTPATIENT)
Dept: INTERNAL MEDICINE | Facility: CLINIC | Age: 57
End: 2022-03-28
Payer: COMMERCIAL

## 2022-03-28 RX ORDER — ALPRAZOLAM 0.5 MG/1
TABLET ORAL
Qty: 60 TABLET | Refills: 0 | Status: SHIPPED | OUTPATIENT
Start: 2022-03-28 | End: 2022-05-23

## 2022-03-28 NOTE — TELEPHONE ENCOUNTER
----- Message from Marci Kriss sent at 3/28/2022  2:59 PM CDT -----  Contact: DENTON REYES [[id number]]  Type: Call Back    Who called:DENTON REYES [[id number]]    What is the request in detail: Patient is requesting a call back. She states that she would like to see a Primary care doctor in [place]. She would like to know if Dr. Khan has in recommendations. Please advise.     Can the clinic reply by [facility]? No    Would the patient rather a call back or a response via [facility]? Call back     Best call back number: [phone] (mobile)    Additional Information:

## 2022-03-29 ENCOUNTER — PATIENT MESSAGE (OUTPATIENT)
Dept: INTERNAL MEDICINE | Facility: CLINIC | Age: 57
End: 2022-03-29
Payer: COMMERCIAL

## 2022-04-25 ENCOUNTER — PATIENT MESSAGE (OUTPATIENT)
Dept: INTERNAL MEDICINE | Facility: CLINIC | Age: 57
End: 2022-04-25
Payer: COMMERCIAL

## 2022-04-25 DIAGNOSIS — Z12.31 ENCOUNTER FOR SCREENING MAMMOGRAM FOR BREAST CANCER: Primary | ICD-10-CM

## 2022-05-19 ENCOUNTER — HOSPITAL ENCOUNTER (OUTPATIENT)
Dept: RADIOLOGY | Facility: HOSPITAL | Age: 57
Discharge: HOME | End: 2022-05-19
Attending: FAMILY MEDICINE
Payer: COMMERCIAL

## 2022-05-19 DIAGNOSIS — Z12.31 ENCOUNTER FOR SCREENING MAMMOGRAM FOR BREAST CANCER: ICD-10-CM

## 2022-05-19 PROCEDURE — 77063 BREAST TOMOSYNTHESIS BI: CPT | Mod: TC,PO

## 2022-05-19 PROCEDURE — 77067 SCR MAMMO BI INCL CAD: CPT | Mod: TC,PO

## 2022-05-22 DIAGNOSIS — G43.009 MIGRAINE WITHOUT AURA AND WITHOUT STATUS MIGRAINOSUS, NOT INTRACTABLE: ICD-10-CM

## 2022-05-22 NOTE — TELEPHONE ENCOUNTER
Care Due:                  Date            Visit Type   Department     Provider  --------------------------------------------------------------------------------                                EP -                              PRIMARY      [facility] INTERNAL  Thomas Rodriguez  Last Visit: 10-      [facility] ([facility]   [facility]  Next Visit: None Scheduled  None         None Found                                                            Last  Test          Frequency    Reason                     Performed    Due Date  --------------------------------------------------------------------------------    CMP.........  12 months..  atorvastatin.............  07- 07-    Lipid Panel.  12 months..  atorvastatin.............  07-   07-    [employer] Embedded Care Gaps. Reference number: [id number]. 5/22/2022   9:30:03 AM CDT

## 2022-05-23 RX ORDER — ALPRAZOLAM 0.5 MG/1
TABLET ORAL
Qty: 20 TABLET | Refills: 0 | Status: SHIPPED | OUTPATIENT
Start: 2022-05-23 | End: 2022-05-30 | Stop reason: SDUPTHER

## 2022-05-23 NOTE — TELEPHONE ENCOUNTER
ALPRAZolam (XANAX) 0.5 MG tablet    Edit       Summary: TAKE 1 TABLET BY MOUTH TWICE A DAY AS NEEDED FOR ANXIETY, Normal     Start: 3/28/2022    Ord/Sold: 3/28/2022 (O)      Report    Long-term:       Pharmacy: [facility]/pharmacy #[id number] - [place], [place] - [address]    Med Dose History         Patient Sig: TAKE 1 TABLET BY MOUTH TWICE A DAY AS NEEDED FOR ANXIETY       Ordered on: 3/28/2022       Authorized by: ISIAH MUHAMMAD       Dispense: 60 tablet       Refills: 0 ordered       Note to Pharmacy: Not to exceed 5 additional fills before 07/26/2022        Last office visit: 1/18/22

## 2022-05-28 ENCOUNTER — PATIENT MESSAGE (OUTPATIENT)
Dept: INTERNAL MEDICINE | Facility: CLINIC | Age: 57
End: 2022-05-28
Payer: COMMERCIAL

## 2022-05-28 DIAGNOSIS — G43.009 MIGRAINE WITHOUT AURA AND WITHOUT STATUS MIGRAINOSUS, NOT INTRACTABLE: ICD-10-CM

## 2022-05-30 RX ORDER — ALPRAZOLAM 0.5 MG/1
0.5 TABLET ORAL 2 TIMES DAILY
Qty: 60 TABLET | Refills: 0 | Status: SHIPPED | OUTPATIENT
Start: 2022-05-30 | End: 2022-08-15

## 2022-05-30 NOTE — TELEPHONE ENCOUNTER
No new care gaps identified.  [facility] Embedded Care Gaps. Reference number: [id number]. 5/30/2022   9:45:49 AM KIMANIT

## 2022-05-31 ENCOUNTER — PATIENT MESSAGE (OUTPATIENT)
Dept: INTERNAL MEDICINE | Facility: CLINIC | Age: 57
End: 2022-05-31
Payer: COMMERCIAL

## 2022-06-21 ENCOUNTER — PATIENT MESSAGE (OUTPATIENT)
Dept: INTERNAL MEDICINE | Facility: CLINIC | Age: 57
End: 2022-06-21
Payer: COMMERCIAL

## 2022-06-21 RX ORDER — CYCLOBENZAPRINE HCL 10 MG
10 TABLET ORAL 3 TIMES DAILY PRN
Qty: 30 TABLET | Refills: 1 | Status: SHIPPED | OUTPATIENT
Start: 2022-06-21 | End: 2023-04-10 | Stop reason: SDUPTHER

## 2022-06-21 NOTE — TELEPHONE ENCOUNTER
----- Message from Khushi Harley sent at 6/21/2022 10:44 AM CDT -----  Regarding: self  .Type: Patient Call Back    Who called: self     What is the request in detail: is requesting increase in dose of her current medication please call     Can the clinic reply by [facility]? No     Would the patient rather a call back or a response via [facility]?  Call     Best call back number: .[phone]      Additional Information: .  [facility]/pharmacy #[id number] - [place] - [address]  [address] [address]  Phone: [phone] Fax: [phone]

## 2022-06-21 NOTE — TELEPHONE ENCOUNTER
No new care gaps identified.  [facility] Embedded Care Gaps. Reference number: [id number]. 6/21/2022   12:05:37 PM CDT

## 2022-06-21 NOTE — TELEPHONE ENCOUNTER
Returned pt's call to inquire which medication she's referring to. Pt states her cyclobenzaprine dose (10 mg) is not helping and is asking for an increase in dosage. To be sent to     [facility]/pharmacy #[id number] - [place] - [address]   [address] [address]   Phone: [phone] Fax: [phone]     Pt asks for a phone call when complete please.

## 2022-06-22 ENCOUNTER — TELEPHONE (OUTPATIENT)
Dept: INTERNAL MEDICINE | Facility: CLINIC | Age: 57
End: 2022-06-22
Payer: COMMERCIAL

## 2022-06-22 RX ORDER — METHOCARBAMOL 500 MG/1
500 TABLET, FILM COATED ORAL 4 TIMES DAILY PRN
Qty: 30 TABLET | Refills: 1 | Status: SHIPPED | OUTPATIENT
Start: 2022-06-22 | End: 2022-07-02

## 2022-06-22 NOTE — TELEPHONE ENCOUNTER
----- Message from Noy Dior sent at 6/21/2022  3:31 PM CDT -----  Name of Who is Calling: DENTON REYES          What is the request in detail: The patient is calling to speak to the nurse in regards to medication. Please advise          Can the clinic reply by [facility]: no         [facility]: [phone]

## 2022-08-24 ENCOUNTER — PATIENT MESSAGE (OUTPATIENT)
Dept: INTERNAL MEDICINE | Facility: CLINIC | Age: 57
End: 2022-08-24
Payer: COMMERCIAL

## 2022-09-30 ENCOUNTER — TELEPHONE (OUTPATIENT)
Dept: INTERNAL MEDICINE | Facility: CLINIC | Age: 57
End: 2022-09-30
Payer: COMMERCIAL

## 2022-09-30 NOTE — TELEPHONE ENCOUNTER
Patient states that she will give office a phone call back after she check with obgyn to see what labs she was requesting for completion. Thanks.

## 2022-09-30 NOTE — TELEPHONE ENCOUNTER
----- Message from Carrie Cancino sent at 9/30/2022 12:14 PM CDT -----  Regarding: orders  Name of Who is Calling:DENTON REYES [[id number]]          What is the request in detail: Patient is requesting a call back  in reference to lab orders            Can the clinic reply by [facility]: yes          What Number to Call Back if not in [facility]:[phone]

## 2022-10-23 DIAGNOSIS — I63.412 CEREBROVASCULAR ACCIDENT (CVA) DUE TO EMBOLISM OF LEFT MIDDLE CEREBRAL ARTERY: ICD-10-CM

## 2022-10-23 DIAGNOSIS — R06.09 DOE (DYSPNEA ON EXERTION): ICD-10-CM

## 2022-10-23 DIAGNOSIS — I51.9 LV DYSFUNCTION: ICD-10-CM

## 2022-10-23 DIAGNOSIS — E61.1 IRON DEFICIENCY: ICD-10-CM

## 2022-10-24 ENCOUNTER — LAB VISIT (OUTPATIENT)
Dept: LAB | Facility: HOSPITAL | Age: 57
End: 2022-10-24
Attending: INTERNAL MEDICINE
Payer: COMMERCIAL

## 2022-10-24 ENCOUNTER — OFFICE VISIT (OUTPATIENT)
Dept: CARDIOLOGY | Facility: CLINIC | Age: 57
End: 2022-10-24
Payer: COMMERCIAL

## 2022-10-24 ENCOUNTER — TELEPHONE (OUTPATIENT)
Dept: CARDIOLOGY | Facility: CLINIC | Age: 57
End: 2022-10-24

## 2022-10-24 VITALS
HEART RATE: 56 BPM | WEIGHT: 190.63 LBS | OXYGEN SATURATION: 99 % | DIASTOLIC BLOOD PRESSURE: 71 MMHG | BODY MASS INDEX: 32.55 KG/M2 | SYSTOLIC BLOOD PRESSURE: 104 MMHG | HEIGHT: 64 IN

## 2022-10-24 DIAGNOSIS — Q21.10 ASD (ATRIAL SEPTAL DEFECT): ICD-10-CM

## 2022-10-24 DIAGNOSIS — E61.1 IRON DEFICIENCY: ICD-10-CM

## 2022-10-24 DIAGNOSIS — Z91.89 CARDIOVASCULAR RISK FACTOR: ICD-10-CM

## 2022-10-24 DIAGNOSIS — R73.09 ELEVATED HEMOGLOBIN A1C: ICD-10-CM

## 2022-10-24 DIAGNOSIS — I63.412 CEREBROVASCULAR ACCIDENT (CVA) DUE TO EMBOLISM OF LEFT MIDDLE CEREBRAL ARTERY: ICD-10-CM

## 2022-10-24 DIAGNOSIS — E65 ABDOMINAL OBESITY: ICD-10-CM

## 2022-10-24 DIAGNOSIS — Z91.89 CARDIOVASCULAR RISK FACTOR: Primary | ICD-10-CM

## 2022-10-24 DIAGNOSIS — R06.09 DOE (DYSPNEA ON EXERTION): ICD-10-CM

## 2022-10-24 LAB
ALBUMIN SERPL BCP-MCNC: 3.9 G/DL (ref 3.5–5.2)
ALP SERPL-CCNC: 105 U/L (ref 55–135)
ALT SERPL W/O P-5'-P-CCNC: 23 U/L (ref 10–44)
ANION GAP SERPL CALC-SCNC: 11 MMOL/L (ref 8–16)
AST SERPL-CCNC: 28 U/L (ref 10–40)
BASOPHILS # BLD AUTO: 0.05 K/UL (ref 0–0.2)
BASOPHILS NFR BLD: 1 % (ref 0–1.9)
BILIRUB SERPL-MCNC: 0.5 MG/DL (ref 0.1–1)
BUN SERPL-MCNC: 8 MG/DL (ref 6–20)
CALCIUM SERPL-MCNC: 9.8 MG/DL (ref 8.7–10.5)
CHLORIDE SERPL-SCNC: 103 MMOL/L (ref 95–110)
CO2 SERPL-SCNC: 27 MMOL/L (ref 23–29)
CREAT SERPL-MCNC: 0.9 MG/DL (ref 0.5–1.4)
DIFFERENTIAL METHOD: ABNORMAL
EOSINOPHIL # BLD AUTO: 0.6 K/UL (ref 0–0.5)
EOSINOPHIL NFR BLD: 12.3 % (ref 0–8)
ERYTHROCYTE [DISTWIDTH] IN BLOOD BY AUTOMATED COUNT: 12 % (ref 11.5–14.5)
EST. GFR  (NO RACE VARIABLE): >60 ML/MIN/1.73 M^2
ESTIMATED AVG GLUCOSE: 114 MG/DL (ref 68–131)
FERRITIN SERPL-MCNC: 235 NG/ML (ref 20–300)
GLUCOSE SERPL-MCNC: 94 MG/DL (ref 70–110)
HBA1C MFR BLD: 5.6 % (ref 4–5.6)
HCT VFR BLD AUTO: 38.5 % (ref 37–48.5)
HGB BLD-MCNC: 12.6 G/DL (ref 12–16)
IMM GRANULOCYTES # BLD AUTO: 0.01 K/UL (ref 0–0.04)
IMM GRANULOCYTES NFR BLD AUTO: 0.2 % (ref 0–0.5)
IRON SERPL-MCNC: 136 UG/DL (ref 30–160)
LYMPHOCYTES # BLD AUTO: 1.6 K/UL (ref 1–4.8)
LYMPHOCYTES NFR BLD: 32.6 % (ref 18–48)
MCH RBC QN AUTO: 31.3 PG (ref 27–31)
MCHC RBC AUTO-ENTMCNC: 32.7 G/DL (ref 32–36)
MCV RBC AUTO: 96 FL (ref 82–98)
MONOCYTES # BLD AUTO: 0.4 K/UL (ref 0.3–1)
MONOCYTES NFR BLD: 7.5 % (ref 4–15)
NEUTROPHILS # BLD AUTO: 2.2 K/UL (ref 1.8–7.7)
NEUTROPHILS NFR BLD: 46.4 % (ref 38–73)
NRBC BLD-RTO: 0 /100 WBC
PLATELET # BLD AUTO: 245 K/UL (ref 150–450)
PMV BLD AUTO: 9.4 FL (ref 9.2–12.9)
POTASSIUM SERPL-SCNC: 4 MMOL/L (ref 3.5–5.1)
PROT SERPL-MCNC: 7.4 G/DL (ref 6–8.4)
RBC # BLD AUTO: 4.03 M/UL (ref 4–5.4)
RETICS/RBC NFR AUTO: 1.7 % (ref 0.5–2.5)
SATURATED IRON: 42 % (ref 20–50)
SODIUM SERPL-SCNC: 141 MMOL/L (ref 136–145)
TOTAL IRON BINDING CAPACITY: 323 UG/DL (ref 250–450)
TRANSFERRIN SERPL-MCNC: 218 MG/DL (ref 200–375)
TSH SERPL DL<=0.005 MIU/L-ACNC: 2.09 UIU/ML (ref 0.4–4)
WBC # BLD AUTO: 4.79 K/UL (ref 3.9–12.7)

## 2022-10-24 PROCEDURE — 84443 ASSAY THYROID STIM HORMONE: CPT | Performed by: INTERNAL MEDICINE

## 2022-10-24 PROCEDURE — 85045 AUTOMATED RETICULOCYTE COUNT: CPT | Performed by: INTERNAL MEDICINE

## 2022-10-24 PROCEDURE — 84466 ASSAY OF TRANSFERRIN: CPT | Performed by: INTERNAL MEDICINE

## 2022-10-24 PROCEDURE — 93000 EKG 12-LEAD: ICD-10-PCS | Mod: S$GLB,,, | Performed by: INTERNAL MEDICINE

## 2022-10-24 PROCEDURE — 85025 COMPLETE CBC W/AUTO DIFF WBC: CPT | Performed by: INTERNAL MEDICINE

## 2022-10-24 PROCEDURE — 82728 ASSAY OF FERRITIN: CPT | Performed by: INTERNAL MEDICINE

## 2022-10-24 PROCEDURE — 80053 COMPREHEN METABOLIC PANEL: CPT | Performed by: INTERNAL MEDICINE

## 2022-10-24 PROCEDURE — 99999 PR PBB SHADOW E&M-EST. PATIENT-LVL III: CPT | Mod: PBBFAC,,, | Performed by: INTERNAL MEDICINE

## 2022-10-24 PROCEDURE — 99215 PR OFFICE/OUTPT VISIT, EST, LEVL V, 40-54 MIN: ICD-10-PCS | Mod: 25,S$GLB,, | Performed by: INTERNAL MEDICINE

## 2022-10-24 PROCEDURE — 83036 HEMOGLOBIN GLYCOSYLATED A1C: CPT | Performed by: INTERNAL MEDICINE

## 2022-10-24 PROCEDURE — 99215 OFFICE O/P EST HI 40 MIN: CPT | Mod: 25,S$GLB,, | Performed by: INTERNAL MEDICINE

## 2022-10-24 PROCEDURE — 36415 COLL VENOUS BLD VENIPUNCTURE: CPT | Performed by: INTERNAL MEDICINE

## 2022-10-24 PROCEDURE — 99999 PR PBB SHADOW E&M-EST. PATIENT-LVL III: ICD-10-PCS | Mod: PBBFAC,,, | Performed by: INTERNAL MEDICINE

## 2022-10-24 PROCEDURE — 93000 ELECTROCARDIOGRAM COMPLETE: CPT | Mod: S$GLB,,, | Performed by: INTERNAL MEDICINE

## 2022-10-24 RX ORDER — ESTRADIOL 0.1 MG/G
CREAM VAGINAL
COMMUNITY
Start: 2022-09-30 | End: 2022-10-24

## 2022-10-24 RX ORDER — FERROUS SULFATE 325(65) MG
TABLET ORAL
Qty: 180 TABLET | Refills: 3 | Status: SHIPPED | OUTPATIENT
Start: 2022-10-24

## 2022-10-24 RX ORDER — LOSARTAN POTASSIUM 25 MG/1
25 TABLET ORAL NIGHTLY
Qty: 90 TABLET | Refills: 3 | Status: SHIPPED | OUTPATIENT
Start: 2022-10-24

## 2022-10-24 NOTE — PROGRESS NOTES
Subjective:    Patient ID:  Renée Goff is a 57 y.o. female who presents for evaluation of No chief complaint on file.  For prior ASD with CVA, palpitations, right-sided weakness with memory loss in 1/2017, LARES with making bed with abnormal Echo, controlled HLD, 4 week follow up.  PCP: Thomas Khan MD, [facility]   Prior Cardiologist: Dr. Jenkins  ENT: Dr. eMnsah  Neurologist: Dr. Henderson, now Dr. 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  Nicotine: never  Alcohol: twice a week, single glass, the most in any 24 hours.  Illicit drugs: none  Cardiac symptoms: less LARES, palpitations at night down to 1 nights weekly  Home BP: do not check  Medication compliance: yes  Diet: regular, some salt  Caffeine: 7 cpd, no sleep problem  Labs: 1/2019, .6, not on Rx, normal CMP, CBC (eosinophil 19.3%), TSH, A1C 5.5%  Lab Results   Component Value Date    TSH 1.135 07/14/2020        Lab Results   Component Value Date    HGBA1C 5.7 (H) 07/14/2020       Lab Results   Component Value Date    WBC 6.20 07/14/2020    HGB 11.1 (L) 07/14/2020    HCT 34.9 (L) 07/14/2020    MCV 91 07/14/2020     07/14/2020       CMP  Sodium   Date Value Ref Range Status   07/14/2020 139 136 - 145 mmol/L Final     Potassium   Date Value Ref Range Status   07/14/2020 3.9 3.5 - 5.1 mmol/L Final     Chloride   Date Value Ref Range Status   07/14/2020 103 95 - 110 mmol/L Final     CO2   Date Value Ref Range Status   07/14/2020 27 23 - 29 mmol/L Final     Glucose   Date Value Ref Range Status   07/14/2020 97 70 - 110 mg/dL Final     BUN   Date  Value Ref Range Status   07/14/2020 8 6 - 20 mg/dL Final     Creatinine   Date Value Ref Range Status   07/14/2020 0.9 0.5 - 1.4 mg/dL Final   11/14/2012 0.8 0.5 - 1.4 mg/dL Final     Calcium   Date Value Ref Range Status   07/14/2020 9.6 8.7 - 10.5 mg/dL Final   11/14/2012 9.6 8.7 - 10.5 mg/dL Final     Total Protein   Date Value Ref Range Status   07/14/2020 7.2 6.0 - 8.4 g/dL Final     Albumin   Date Value Ref Range Status   07/14/2020 3.9 3.5 - 5.2 g/dL Final     Total Bilirubin   Date Value Ref Range Status   07/14/2020 0.5 0.1 - 1.0 mg/dL Final     Comment:     For infants and newborns, interpretation of results should be based  on gestational age, weight and in agreement with clinical  observations.  Premature Infant recommended reference ranges:  Up to 24 hours.............<8.0 mg/dL  Up to 48 hours............<12.0 mg/dL  3-5 days..................<15.0 mg/dL  6-29 days.................<15.0 mg/dL       Alkaline Phosphatase   Date Value Ref Range Status   07/14/2020 121 55 - 135 U/L Final     AST   Date Value Ref Range Status   07/14/2020 29 10 - 40 U/L Final     ALT   Date Value Ref Range Status   07/14/2020 17 10 - 44 U/L Final     Anion Gap   Date Value Ref Range Status   07/14/2020 9 8 - 16 mmol/L Final   11/14/2012 13 5 - 15 meq/L Final     eGFR if    Date Value Ref Range Status   07/14/2020 >60 >60 mL/min/1.73 m^2 Final     eGFR if non    Date Value Ref Range Status   07/14/2020 >60 >60 mL/min/1.73 m^2 Final     Comment:     Calculation used to obtain the estimated glomerular filtration  rate (eGFR) is the CKD-EPI equation.        @labrcntip(troponini)@    BNP   Date Value Ref Range Status   05/20/2019 92 0 - 99 pg/mL Final     Comment:     Values of less than 100 pg/ml are consistent with non-CHF populations.   }   Lab Results   Component Value Date    CHOL 145 07/14/2020    CHOL 243 (H) 01/31/2019    CHOL 127 11/09/2017     Lab Results   Component Value Date    HDL 59  07/14/2020    HDL 59 01/31/2019    HDL 41 11/09/2017     Lab Results   Component Value Date    LDLCALC 68.4 07/14/2020    LDLCALC 161.6 (H) 01/31/2019    LDLCALC 63.6 11/09/2017     Lab Results   Component Value Date    TRIG 88 07/14/2020    TRIG 112 01/31/2019    TRIG 112 11/09/2017     Lab Results   Component Value Date    CHOLHDL 40.7 07/14/2020    CHOLHDL 24.3 01/31/2019    CHOLHDL 32.3 11/09/2017     Lab Results   Component Value Date    IRON 72 06/18/2021    TRANSFERRIN 272 06/18/2021    TIBC 403 06/18/2021    FESATURATED 18 (L) 06/18/2021       Last Echo: 7/2021  Last stress test: 1/2017  Cardiovascular angiogram: none  ECG: NSR with PVC, possible lateral MI  Fundoscopic exam: within the past year, negative for retinopathy    In 11/2012:  47 y.o. female admitted to Hospitalist Service from [facility] Emergency Room with complaint of chest pain 1998, history of CVA 1996 and history of migrain headache. Patient d, she was in her usual state of health, suddenly started experiencing substernal chest Pain, non-radiating, felt SOB. CP was constant. Chest pain was moderate in intensity.  No recent angina like complaints. Last cardiac stress test was in 1998. Patient was admitted to Hospitalist medicine service. Patient was evaluated by Dr. Chavira. Serial cardiac enzymes were negative.  Patient underwent Lexiscan which was negative for ischemia. During hospital stay, patient had a migrain headache which was medically managed. Patient was discharged home in stable condition with following discharge plan of care.     Cardiac evaluation, 11/2012:  ECHO CONCLUSIONS                                                                  1 - Mild left ventricular enlargement.                                       2 - Low normal left ventricular function (EF 51%).                           3 - Normal diastolic function.                                               4 - Right ventricle is upper limit of normal in size with  normal             systolic function    Lexiscan:  Nuclear Quantitative Functional Analysis:                                    LVEF: 53 % (normal is 55 - 69)                                               LVED Volume: 105 ml (normal is 60 - 98)                                      LVES Volume: 49 ml (normal is 20 - 42)                                                                                                                    Impression: NORMAL MYOCARDIAL PERFUSION                                      1. The perfusion scan is free of evidence for myocardial ischemia or         injury.                                                                      2. There is a moderate intensity fixed defect in the anteroapical wall of    the left ventricle, secondary to breast attenuation.                         3. Resting wall motion is physiologic.                                       4. There is resting LV dysfunction with a reduced ejection fraction of 53    %.  (normal is 55 - 69)                                                      5. The ventricular volumes are normal at rest and stress.                    6. The extracardiac distribution of radioactivity is normal.    Since visit of 12/3/2012, no new problem, improve situation at home, caring for 3 grandson, bike twice a week for about 30 minutes with occasional walk, no regular exercise routine. Still working part-time. Denies any symptoms. EKG today NSR, rate of 70 with anterior T waves inversion.    Since visit of 1/27/2014, began experiencing sudden onset of vertigo after [place], accompanied by nausea and unable to stand nor walk. Can last up to a few days, usually a day and a half. No fall. No problem with home medications. Migraine not a problem. 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 ECG continue to show anterior T-waves inversion. No CP unless out of propranolol. Last lipid in record, done 11/2012, showed LDL-C 119. ASCVD 10-year risk is  "only 0.74%, very, very very low.    In 10/2016, no new problem, less stress, now caring for grandchildren, active walking with stroller every other day and biking every other day. Noted some increase chest pounding this month, last up to 20 minutes, feels real anxious, no additional medication. Some SOB. Migraines are less, may be once every other week but can last up to 2 days. Mother passed in 11/2015, age 72.     In 11/2017, here for annual reviewed, had sudden onset of right-sided weakness with memory loss in 1/2017, hospitalized for 2 days, was on full dose ASA at the time.  DCS - "Hospital Course:   Patient is a 51 y.o. female admitted to Hospitalist Service from [facility] Emergency Room with complaint of chest pain for 1 day. Patient reportedly has past medical history significant for hypertension, CAD, history of AMI and CVA. Patient reported chest pain started last night which was radiating to arms and upper back. Patient has also been experiencing headache. No head injury, LOC or seizure activity reported. Patient denied any focal deficits. No associated nausea or diaphorsis reported. No leg swelling of calf tenderness. Patient denied shortness of breath, abdominal pain, nausea, vomiting, headache, vision changes, focal neuro-deficits, cough or fever. Patient was admitted to Hospitalist medicine service. Patient was evaluated by Dr. Zamora and Dr. Chavira. Patient was monitored on telemetry medical floor, serial cardiac enzymes remained negative. Patient was evaluated by cardiologist and had further cardiac workup as mentioned below, which was negative for acute ischemia. Patient's symptoms resolved. MRI brain did not confirm CVA. Neurological symptoms resolved. Patient encouraged to follow up closely and have amnesia work up completed. Patient was discharged home in stable condition with following discharge plan of care.      Consults: Dr. Zamora and Dr. Chavira  Lexiscan:  1. The perfusion scan is free " "of evidence for myocardial ischemia or injury.   2. There is abnormal wall motion at rest showing moderate hypokinesis of the septal wall of the left ventricle.   3. Resting LV function is normal.  (normal is 55 - 69)  4. The ventricular volumes are normal at rest and stress.   5. The extracardiac distribution of radioactivity is normal.   6. There is evidence of right ventricular hypertrophy.   7. When compared to the previous study from 11/13/2012, the RV appears more prominent on current study.     MRI Brain: Negative MRI of the brain.  Chronic right maxillary and ethmoid sinusitis.  Carotid US: Significant atherosclerotic plaque or calcification is not identified in the carotid arteries by real-time ultrasound or NASCET criteria      ECHO:     1 - Low normal left ventricular systolic function (EF 50-55%).     2 - Normal left ventricular diastolic function.     3 - Right ventricle is upper limit of normal in size with normal systolic function.     4 - The estimated PA systolic pressure is greater than 31 mmHg.     5 - Difficult windows, even with the use of Optison contrast.  Unable to adequately assess for discrete wall motion abnormalities .     6 - Compared to echo from 11/13/2012, no significant change."     Lipid .4, ASCVD 10-year event risk only 1.7%, was placed on atorvastatin 20 mg, no repeat lipid obtained. For the past month noted recurrent right sided CP, like a pressure radiating to the back, last up to half an hour, intensity rated 6/10, no associated symptoms, able to return to sleep. No known inciting factor, don't feel stress. Active with walking daily for half an hour, no problem excepted noted some heart racing. ECG is essentially normal , rate 72, non-specific STA. Compliant with medications, never smoked, very occasional alcohol. Uses Mobic very rarely for pain in the shoulder.     In 5/2019, here for annual review and medication refill. No heart worries, noted some SOB when making the " bed. Gym exercise no problem. Labs indicates eosinophilia suspect some form allergies. Do report occasional wheezing and cough.    In 6/2021 return for need of Rx. Continue with some concern of LARES but now able to climb 2 flights of stairs. Back working full time with problem. Some palpitations at night while watching TV resolve by falling asleep. Scary.     In 7/2021, return for Echo results.  Echo - The left ventricle is normal in size with concentric remodeling and mildly decreased systolic function.  The estimated ejection fraction is 45%.  Grade I left ventricular diastolic dysfunction.  Normal right ventricular size.  Mild mitral regurgitation.  The left ventricular global longitudinal strain is -13%. Reduced.  There is left ventricular global hypokinesis.  Suggest further deterioration of LVEF from Echo in 1/2017    In 8/2021, no significant change, able to tolerate low dose ARB and iron supplement. Been on BB for several years, noted some fatigue starting last year.     HPI Comments: in 10/2022, return for annual follow up and medication renewal. Doing well, just mild LARES, improved. Placed on supplement for KERMIT. No recent labs. No heart worries.    ROS     Constitutional: negative for chills, no fatigue, no fevers, sweats, weight stable since 7/2020  HENT: no sinus congestion, no further hoarseness or sore throat.  Eyes: negative for icterus, redness and visual disturbance  Respiratory: negative for asthma, cough, still with mild dyspnea on exertion, no hemoptysis, pleurisy/chest pain and wheezing, Trinity score 2, snoring noted but awaken refreshed.  Cardiovascular: no chest pain, no chest pressure/discomfort, dyspnea, near-syncope, positive for night time palpitations but improved and LARES, no paroxysmal nocturnal dyspnea and syncope  Gastrointestinal: no abdominal pain, heart burn, no nausea and vomiting  Musculoskeletal: negative for arthralgias, muscle weakness and myalgias, some back pain with bending,  "keep moving  Neurological: negative for coordination problems, gait problems, tremors, less dizziness and vertigo, rare headaches / migraine with nausea, and paresthesia, occasional migraine HA, can last up to 2 days. Some difficulties in concentration and coordination, no fall but do lose balance easily.  Psych: no depression or anxiety, no further memory loss, some anxiety    Objective:    Physical Exam     General appearance: alert, appears stated age, cooperative and no distress, RA O2 sat 99%  Head: Normocephalic, without obvious abnormality, atraumatic  Eyes:  conjunctivae/corneas clear. PERRL, EOM's intact.   Neck: no adenopathy, no carotid bruit, no JVD, supple, symmetrical, trachea midline and thyroid not enlarged, symmetric, no tenderness/mass/nodules  Lungs:  clear to auscultation bilaterally  Chest wall: no further tenderness  Heart: bradycardic rate and rhythm, S1, S2 normal, no murmur, click, rub or gallop   Abdomen: soft, non-tender; bowel sounds normal; no masses,  no organomegaly, waist circumference 35.25" increased to 39.5", hip 41"  Extremities: extremities normal, atraumatic, no cyanosis or edema  Pulses: 2+ and symmetric    Assessment:       1. Cardiovascular risk factor    2. Elevated hemoglobin A1c    3. Cerebrovascular accident (CVA) due to embolism of left middle cerebral artery. 1996    4. LARES (dyspnea on exertion), onset 1/2019    5. ASD (atrial septal defect), closed 12/1996    6. Abdominal obesity    7. Iron deficiency         Plan:       Renée MORENO was seen today for follow-up.    Diagnoses and associated orders for this visit:    Cardiovascular risk factor  -     IN OFFICE EKG 12-LEAD (to Muse)  -     Comprehensive Metabolic Panel; Future; Expected date: 10/24/2022  -     TSH; Future; Expected date: 10/24/2022    Elevated hemoglobin A1c  -     Hemoglobin A1C; Future; Expected date: 10/24/2022    Cerebrovascular accident (CVA) due to embolism of left middle cerebral artery. 1996    LARES " (dyspnea on exertion), onset 1/2019    ASD (atrial septal defect), closed 12/1996    Abdominal obesity    Iron deficiency  -     CBC Auto Differential; Future; Expected date: 10/24/2022  -     Ferritin; Future; Expected date: 10/24/2022  -     Iron and TIBC; Future; Expected date: 10/24/2022  -     Reticulocytes; Future; Expected date: 10/24/2022    - All medical issues reviewed, continue current Rx.   - Try using the inhaler prior to task that would cause SOB.  - CV status and all medications reviewed, patient acknowledge good understanding.  - Recommend healthy living: moderate alcohol, healthy diet and regular exercise aiming for fitness, restorative sleep and weight control  - Discussed healthy alcohol daily limit of 0.5 oz of pure alcohol in any 24 hours (roughly one 12-oz beers, 5 oz of wine (8%-12% alcohol), or 0.75 oz (half a shot) of liquor (80 proof)), can not save up.  - Highly recommend 30-60 minutes of exercise daily, can have Sunday off, with 2-3 sessions of muscle strengthening weekly. A  would be very helpful.  - Have to do some abdominal exercise to rid belly fat  - Consider HIIT, at least 10 minutes daily.  - Instruction for Mediterranean, high potassium diet and heart healthy exercise given.  - Weigh twice weekly, try to lose 1-2 lbs per week, target loss 5%, to 10%  - Need good exercise program, 4 key elements: 1. Aerobic (walking, swimming, dancing, jogging, biking, etc, 2. Muscle strengthening / resistance exercise, need to do 2-3 times weekly, 3. Stretching daily, good stretch takes a whole  total minute. 4. Balance exercise daily.  - Recommend at least annual cardiovascular evaluation in view of patient's significant risk factors. Patient's preference.  - Phone review / encourage use of [facility]    Patient Active Problem List   Diagnosis    Cerebrovascular accident (CVA) due to embolism of left middle cerebral artery. 1996    ASD (atrial septal defect), closed 12/1996     Nonspecific abnormal electrocardiogram (ECG)    KIRA (generalized anxiety disorder)    Abnormal CT of the head    Perennial sinusitis    Migraine    BMI 29.3 - 29.9, today 30.5    Abdominal obesity    Right sided weakness (rule out new CVA), 1/2017    Hypercholesterolemia, baseline .6    LARES (dyspnea on exertion), onset 1/2019    Eosinophilia    Acute bronchitis    Mediastinal adenopathy    Thrombocytopenia    Severe persistent asthma without complication    Bilateral wheezing    Anemia    Bradycardia, drug induced    Palpitations    Iron deficiency    Chronic fatigue, onset 2020    Elevated hemoglobin A1c     Greater than 50% was spent in counseling and coordination of care. The above assessment and plan have been discussed at length. Labs and procedure over the last 6 months reviewed. Problem List updated. Asked to bring in all active medications / pills bottles with next visit.

## 2022-10-24 NOTE — TELEPHONE ENCOUNTER
LVM to go over results with pt.   ----- Message from Giacomo Chavira MD sent at 10/24/2022  2:07 PM CDT -----  Looks good, can stop iron supplements. Please review with patient. Thanks,    Dr. Chavira    ----- Message -----  From: George, [facility]  Sent: 10/24/2022  10:40 AM CDT  To: Giacomo Chavira MD

## 2022-11-17 ENCOUNTER — PATIENT MESSAGE (OUTPATIENT)
Dept: INTERNAL MEDICINE | Facility: CLINIC | Age: 57
End: 2022-11-17
Payer: COMMERCIAL

## 2022-11-17 DIAGNOSIS — G47.33 OSA (OBSTRUCTIVE SLEEP APNEA): Primary | ICD-10-CM

## 2022-11-26 DIAGNOSIS — G43.009 MIGRAINE WITHOUT AURA AND WITHOUT STATUS MIGRAINOSUS, NOT INTRACTABLE: ICD-10-CM

## 2022-11-28 RX ORDER — BUTALBITAL, ACETAMINOPHEN AND CAFFEINE 50; 325; 40 MG/1; MG/1; MG/1
TABLET ORAL
Qty: 12 TABLET | Refills: 3 | OUTPATIENT
Start: 2022-11-28

## 2022-11-28 RX ORDER — BUTALBITAL, ACETAMINOPHEN AND CAFFEINE 50; 325; 40 MG/1; MG/1; MG/1
1 TABLET ORAL EVERY 4 HOURS PRN
Qty: 12 TABLET | Refills: 3 | Status: SHIPPED | OUTPATIENT
Start: 2022-11-28 | End: 2023-01-03 | Stop reason: SDUPTHER

## 2022-11-29 ENCOUNTER — OFFICE VISIT (OUTPATIENT)
Dept: SLEEP MEDICINE | Facility: CLINIC | Age: 57
End: 2022-11-29
Attending: FAMILY MEDICINE
Payer: COMMERCIAL

## 2022-11-29 DIAGNOSIS — G47.33 OSA (OBSTRUCTIVE SLEEP APNEA): ICD-10-CM

## 2022-11-29 DIAGNOSIS — I63.412 CEREBROVASCULAR ACCIDENT (CVA) DUE TO EMBOLISM OF LEFT MIDDLE CEREBRAL ARTERY: ICD-10-CM

## 2022-11-29 DIAGNOSIS — G47.30 SLEEP APNEA, UNSPECIFIED TYPE: Primary | ICD-10-CM

## 2022-11-29 DIAGNOSIS — G43.009 MIGRAINE WITHOUT AURA AND WITHOUT STATUS MIGRAINOSUS, NOT INTRACTABLE: ICD-10-CM

## 2022-11-29 PROCEDURE — 99204 PR OFFICE/OUTPT VISIT, NEW, LEVL IV, 45-59 MIN: ICD-10-PCS | Mod: CR,95,, | Performed by: NURSE PRACTITIONER

## 2022-11-29 PROCEDURE — 99204 OFFICE O/P NEW MOD 45 MIN: CPT | Mod: CR,95,, | Performed by: NURSE PRACTITIONER

## 2022-11-29 NOTE — PROGRESS NOTES
The patient location is: work  The chief complaint leading to consultation is: sleep evaluation    Visit type: audiovisual    Face to Face time with patient:15 minutes of total time spent on the encounter, which includes face to face time and non-face to face time preparing to see the patient (eg, review of tests), Obtaining and/or reviewing separately obtained history, Documenting clinical information in the electronic or other health record, Independently interpreting results (not separately reported) and communicating results to the patient/family/caregiver, or Care coordination (not separately reported)Each patient to whom he or she provides medical services by telemedicine is:  (1) informed of the relationship between the physician and patient and the respective role of any other health care provider with respect to management of the patient; and (2) notified that he or she may decline to receive medical services by telemedicine and may withdraw from such care at any time.    Referred by Dr. Khan    HISTORY OF PRESENT ILLNESS:She has never had a sleep study. Maybe 3x/week wakes up gasping for air/not aware she does this. Bad, loud snoring  reports. Nocturia 1-2x or disrupted sleep to sip water. +daytime sleepiness. Mouth drying. 2x/month maybe has am headaches. +daytime sleepiness  BP normal  2 CVA Mar and Dec 1996, then had PFO closure  Amitriptyline 40mg qhs (migrianep proph)and xanax 0.5mg bedtime    Denies symptoms of restless legs.     On todays [place] Sleepiness Scale the patient scores a 15/24.   Has ample sleep time available    FAMILY HISTORY: No known sleep disorders.   SOCIAL HISTORY: .  dental office      ASSESSMENT:   Unspecified Sleep Apnea, with symptoms of disruptive snoring, apneic pauses, un-refreshing disrupted sleep and excessive daytime sleepiness with medical comorbidities of hx CVA,  hyperlipidemia, migraine w/o aura, asthma. Warrants further investigation  for untreated sleep apnea.     PLAN:   1. Home Sleep Study, discussed plan of care (if + plan apap/nose with adherence monitoring)   2. Discussed etiology of STEVE and potential ramifications of untreated STEVE, including stroke, heart disease, HTN.  We discussed potential treatment options, which could include continuous positive airway pressure (CPAP-definitive), mandibular advancement splint by dentist, or referral for surgical consideration.   3. See neuro as advised migraine mgt/continue meds      Thank you for allowing me the opportunity to participate in the care of your patient

## 2022-12-01 ENCOUNTER — TELEPHONE (OUTPATIENT)
Dept: SLEEP MEDICINE | Facility: OTHER | Age: 57
End: 2022-12-01
Payer: COMMERCIAL

## 2022-12-13 ENCOUNTER — TELEPHONE (OUTPATIENT)
Dept: SLEEP MEDICINE | Facility: OTHER | Age: 57
End: 2022-12-13
Payer: COMMERCIAL

## 2022-12-29 ENCOUNTER — TELEPHONE (OUTPATIENT)
Dept: SLEEP MEDICINE | Facility: OTHER | Age: 57
End: 2022-12-29
Payer: COMMERCIAL

## 2022-12-29 NOTE — TELEPHONE ENCOUNTER
Patient no showed for the home sleep study,left message to reschedule.  No response,sent out a message through my chart to reschedule.

## 2023-01-02 ENCOUNTER — PATIENT MESSAGE (OUTPATIENT)
Dept: INTERNAL MEDICINE | Facility: CLINIC | Age: 58
End: 2023-01-02
Payer: COMMERCIAL

## 2023-01-02 DIAGNOSIS — G43.009 MIGRAINE WITHOUT AURA AND WITHOUT STATUS MIGRAINOSUS, NOT INTRACTABLE: ICD-10-CM

## 2023-01-03 RX ORDER — BUTALBITAL, ACETAMINOPHEN AND CAFFEINE 50; 325; 40 MG/1; MG/1; MG/1
1 TABLET ORAL EVERY 4 HOURS PRN
Qty: 12 TABLET | Refills: 3 | Status: SHIPPED | OUTPATIENT
Start: 2023-01-03 | End: 2023-01-09 | Stop reason: SDUPTHER

## 2023-01-03 NOTE — TELEPHONE ENCOUNTER
No new care gaps identified.  [facility] Embedded Care Gaps. Reference number: [id number]. 1/03/2023   11:59:15 AM CST

## 2023-01-06 ENCOUNTER — TELEPHONE (OUTPATIENT)
Dept: SLEEP MEDICINE | Facility: OTHER | Age: 58
End: 2023-01-06
Payer: COMMERCIAL

## 2023-01-06 NOTE — TELEPHONE ENCOUNTER
Patient no showed for the home sleep study. Left message and sent out a message through my chart to reschedule.  No response.

## 2023-01-07 ENCOUNTER — HOSPITAL ENCOUNTER (OUTPATIENT)
Facility: HOSPITAL | Age: 58
Discharge: HOME | End: 2023-01-09
Attending: EMERGENCY MEDICINE | Admitting: STUDENT IN AN ORGANIZED HEALTH CARE EDUCATION/TRAINING PROGRAM
Payer: COMMERCIAL

## 2023-01-07 DIAGNOSIS — E78.00 HYPERCHOLESTEROLEMIA: ICD-10-CM

## 2023-01-07 DIAGNOSIS — R29.810 FACIAL DROOP: Primary | ICD-10-CM

## 2023-01-07 DIAGNOSIS — H61.22 IMPACTED CERUMEN OF LEFT EAR: ICD-10-CM

## 2023-01-07 DIAGNOSIS — G45.9 TIA (TRANSIENT ISCHEMIC ATTACK): ICD-10-CM

## 2023-01-07 DIAGNOSIS — I63.9 STROKE: ICD-10-CM

## 2023-01-07 DIAGNOSIS — I63.9 CVA (CEREBRAL VASCULAR ACCIDENT): ICD-10-CM

## 2023-01-07 DIAGNOSIS — Q21.10 ASD (ATRIAL SEPTAL DEFECT): ICD-10-CM

## 2023-01-07 DIAGNOSIS — I63.412 CEREBROVASCULAR ACCIDENT (CVA) DUE TO EMBOLISM OF LEFT MIDDLE CEREBRAL ARTERY: ICD-10-CM

## 2023-01-07 PROBLEM — H91.92 HEARING LOSS OF LEFT EAR: Status: ACTIVE | Noted: 2023-01-07

## 2023-01-07 LAB
ALBUMIN SERPL BCP-MCNC: 3.5 G/DL (ref 3.5–5.2)
ALP SERPL-CCNC: 95 U/L (ref 55–135)
ALT SERPL W/O P-5'-P-CCNC: 19 U/L (ref 10–44)
ANION GAP SERPL CALC-SCNC: 10 MMOL/L (ref 8–16)
AST SERPL-CCNC: 23 U/L (ref 10–40)
BASOPHILS # BLD AUTO: 0.07 K/UL (ref 0–0.2)
BASOPHILS NFR BLD: 1.1 % (ref 0–1.9)
BILIRUB SERPL-MCNC: 0.4 MG/DL (ref 0.1–1)
BUN SERPL-MCNC: 8 MG/DL (ref 6–20)
CALCIUM SERPL-MCNC: 9 MG/DL (ref 8.7–10.5)
CHLORIDE SERPL-SCNC: 103 MMOL/L (ref 95–110)
CHOLEST SERPL-MCNC: 145 MG/DL (ref 120–199)
CHOLEST/HDLC SERPL: 2.8 {RATIO} (ref 2–5)
CO2 SERPL-SCNC: 22 MMOL/L (ref 23–29)
CREAT SERPL-MCNC: 0.9 MG/DL (ref 0.5–1.4)
CREAT SERPL-MCNC: 0.9 MG/DL (ref 0.5–1.4)
DIFFERENTIAL METHOD: ABNORMAL
EOSINOPHIL # BLD AUTO: 0.6 K/UL (ref 0–0.5)
EOSINOPHIL NFR BLD: 9.6 % (ref 0–8)
ERYTHROCYTE [DISTWIDTH] IN BLOOD BY AUTOMATED COUNT: 12.3 % (ref 11.5–14.5)
EST. GFR  (NO RACE VARIABLE): >60 ML/MIN/1.73 M^2
GLUCOSE SERPL-MCNC: 152 MG/DL (ref 70–110)
HCT VFR BLD AUTO: 34 % (ref 37–48.5)
HDLC SERPL-MCNC: 52 MG/DL (ref 40–75)
HDLC SERPL: 35.9 % (ref 20–50)
HGB BLD-MCNC: 11.4 G/DL (ref 12–16)
IMM GRANULOCYTES # BLD AUTO: 0.01 K/UL (ref 0–0.04)
IMM GRANULOCYTES NFR BLD AUTO: 0.2 % (ref 0–0.5)
INR PPP: 1 (ref 0.8–1.2)
LDLC SERPL CALC-MCNC: 76.8 MG/DL (ref 63–159)
LYMPHOCYTES # BLD AUTO: 2.3 K/UL (ref 1–4.8)
LYMPHOCYTES NFR BLD: 36.6 % (ref 18–48)
MCH RBC QN AUTO: 31.2 PG (ref 27–31)
MCHC RBC AUTO-ENTMCNC: 33.5 G/DL (ref 32–36)
MCV RBC AUTO: 93 FL (ref 82–98)
MONOCYTES # BLD AUTO: 0.6 K/UL (ref 0.3–1)
MONOCYTES NFR BLD: 8.6 % (ref 4–15)
NEUTROPHILS # BLD AUTO: 2.8 K/UL (ref 1.8–7.7)
NEUTROPHILS NFR BLD: 43.9 % (ref 38–73)
NONHDLC SERPL-MCNC: 93 MG/DL
NRBC BLD-RTO: 0 /100 WBC
PLATELET # BLD AUTO: 218 K/UL (ref 150–450)
PMV BLD AUTO: 9.6 FL (ref 9.2–12.9)
POC PTINR: 1.1 (ref 0.9–1.2)
POCT GLUCOSE: 177 MG/DL (ref 70–110)
POTASSIUM SERPL-SCNC: 3.5 MMOL/L (ref 3.5–5.1)
PROT SERPL-MCNC: 6.7 G/DL (ref 6–8.4)
PROTHROMBIN TIME: 10.9 SEC (ref 9–12.5)
RBC # BLD AUTO: 3.65 M/UL (ref 4–5.4)
SAMPLE: NORMAL
SAMPLE: NORMAL
SODIUM SERPL-SCNC: 135 MMOL/L (ref 136–145)
TRIGL SERPL-MCNC: 81 MG/DL (ref 30–150)
TSH SERPL DL<=0.005 MIU/L-ACNC: 1.96 UIU/ML (ref 0.4–4)
WBC # BLD AUTO: 6.36 K/UL (ref 3.9–12.7)

## 2023-01-07 PROCEDURE — 99285 EMERGENCY DEPT VISIT HI MDM: CPT | Mod: 25

## 2023-01-07 PROCEDURE — 83036 HEMOGLOBIN GLYCOSYLATED A1C: CPT | Performed by: NURSE PRACTITIONER

## 2023-01-07 PROCEDURE — 63600175 PHARM REV CODE 636 W HCPCS: Performed by: NURSE PRACTITIONER

## 2023-01-07 PROCEDURE — G0378 HOSPITAL OBSERVATION PER HR: HCPCS

## 2023-01-07 PROCEDURE — 85610 PROTHROMBIN TIME: CPT | Performed by: EMERGENCY MEDICINE

## 2023-01-07 PROCEDURE — 27000221 HC OXYGEN, UP TO 24 HOURS

## 2023-01-07 PROCEDURE — 85025 COMPLETE CBC W/AUTO DIFF WBC: CPT | Performed by: EMERGENCY MEDICINE

## 2023-01-07 PROCEDURE — 36415 COLL VENOUS BLD VENIPUNCTURE: CPT | Performed by: EMERGENCY MEDICINE

## 2023-01-07 PROCEDURE — 93005 ELECTROCARDIOGRAM TRACING: CPT

## 2023-01-07 PROCEDURE — 25000003 PHARM REV CODE 250: Performed by: NURSE PRACTITIONER

## 2023-01-07 PROCEDURE — 93010 EKG 12-LEAD: ICD-10-PCS | Mod: ,,, | Performed by: INTERNAL MEDICINE

## 2023-01-07 PROCEDURE — 99213 PR OFFICE/OUTPT VISIT, EST, LEVL III, 20-29 MIN: ICD-10-PCS | Mod: GT,,, | Performed by: PSYCHIATRY & NEUROLOGY

## 2023-01-07 PROCEDURE — 96374 THER/PROPH/DIAG INJ IV PUSH: CPT

## 2023-01-07 PROCEDURE — 36415 COLL VENOUS BLD VENIPUNCTURE: CPT | Performed by: NURSE PRACTITIONER

## 2023-01-07 PROCEDURE — 82962 GLUCOSE BLOOD TEST: CPT

## 2023-01-07 PROCEDURE — 25500020 PHARM REV CODE 255: Performed by: EMERGENCY MEDICINE

## 2023-01-07 PROCEDURE — 85610 PROTHROMBIN TIME: CPT

## 2023-01-07 PROCEDURE — 99213 OFFICE O/P EST LOW 20 MIN: CPT | Mod: GT,,, | Performed by: PSYCHIATRY & NEUROLOGY

## 2023-01-07 PROCEDURE — 69209 REMOVE IMPACTED EAR WAX UNI: CPT | Mod: LT

## 2023-01-07 PROCEDURE — 94761 N-INVAS EAR/PLS OXIMETRY MLT: CPT

## 2023-01-07 PROCEDURE — 99900035 HC TECH TIME PER 15 MIN (STAT)

## 2023-01-07 PROCEDURE — 80061 LIPID PANEL: CPT | Performed by: EMERGENCY MEDICINE

## 2023-01-07 PROCEDURE — 25000003 PHARM REV CODE 250: Performed by: EMERGENCY MEDICINE

## 2023-01-07 PROCEDURE — 96372 THER/PROPH/DIAG INJ SC/IM: CPT | Mod: 59 | Performed by: NURSE PRACTITIONER

## 2023-01-07 PROCEDURE — 80053 COMPREHEN METABOLIC PANEL: CPT | Performed by: EMERGENCY MEDICINE

## 2023-01-07 PROCEDURE — 93010 ELECTROCARDIOGRAM REPORT: CPT | Mod: ,,, | Performed by: INTERNAL MEDICINE

## 2023-01-07 PROCEDURE — 84443 ASSAY THYROID STIM HORMONE: CPT | Performed by: EMERGENCY MEDICINE

## 2023-01-07 RX ORDER — METOPROLOL TARTRATE 50 MG/1
50 TABLET ORAL 2 TIMES DAILY
Status: DISCONTINUED | OUTPATIENT
Start: 2023-01-07 | End: 2023-01-09 | Stop reason: HOSPADM

## 2023-01-07 RX ORDER — SODIUM CHLORIDE 0.9 % (FLUSH) 0.9 %
10 SYRINGE (ML) INJECTION
Status: DISCONTINUED | OUTPATIENT
Start: 2023-01-07 | End: 2023-01-09 | Stop reason: HOSPADM

## 2023-01-07 RX ORDER — PANTOPRAZOLE SODIUM 40 MG/1
40 TABLET, DELAYED RELEASE ORAL DAILY
Status: DISCONTINUED | OUTPATIENT
Start: 2023-01-08 | End: 2023-01-09 | Stop reason: HOSPADM

## 2023-01-07 RX ORDER — MONTELUKAST SODIUM 10 MG/1
10 TABLET ORAL DAILY
Status: DISCONTINUED | OUTPATIENT
Start: 2023-01-08 | End: 2023-01-09 | Stop reason: HOSPADM

## 2023-01-07 RX ORDER — DOCUSATE SODIUM 50 MG/5ML
50 LIQUID ORAL
Status: COMPLETED | OUTPATIENT
Start: 2023-01-07 | End: 2023-01-07

## 2023-01-07 RX ORDER — ENOXAPARIN SODIUM 100 MG/ML
40 INJECTION SUBCUTANEOUS EVERY 24 HOURS
Status: DISCONTINUED | OUTPATIENT
Start: 2023-01-07 | End: 2023-01-09 | Stop reason: HOSPADM

## 2023-01-07 RX ORDER — NAPROXEN SODIUM 220 MG/1
81 TABLET, FILM COATED ORAL DAILY
Status: DISCONTINUED | OUTPATIENT
Start: 2023-01-08 | End: 2023-01-09 | Stop reason: HOSPADM

## 2023-01-07 RX ORDER — ACETAMINOPHEN 325 MG/1
650 TABLET ORAL
Status: COMPLETED | OUTPATIENT
Start: 2023-01-07 | End: 2023-01-07

## 2023-01-07 RX ORDER — AMITRIPTYLINE HYDROCHLORIDE 10 MG/1
40 TABLET, FILM COATED ORAL NIGHTLY
Status: DISCONTINUED | OUTPATIENT
Start: 2023-01-07 | End: 2023-01-09 | Stop reason: HOSPADM

## 2023-01-07 RX ORDER — ONDANSETRON 2 MG/ML
4 INJECTION INTRAMUSCULAR; INTRAVENOUS EVERY 8 HOURS PRN
Status: DISCONTINUED | OUTPATIENT
Start: 2023-01-07 | End: 2023-01-09 | Stop reason: HOSPADM

## 2023-01-07 RX ORDER — ALPRAZOLAM 0.25 MG/1
0.5 TABLET ORAL 2 TIMES DAILY
Status: DISCONTINUED | OUTPATIENT
Start: 2023-01-07 | End: 2023-01-09 | Stop reason: HOSPADM

## 2023-01-07 RX ORDER — ATORVASTATIN CALCIUM 20 MG/1
20 TABLET, FILM COATED ORAL DAILY
Status: DISCONTINUED | OUTPATIENT
Start: 2023-01-08 | End: 2023-01-09 | Stop reason: HOSPADM

## 2023-01-07 RX ADMIN — METOPROLOL TARTRATE 50 MG: 50 TABLET, FILM COATED ORAL at 11:01

## 2023-01-07 RX ADMIN — IOHEXOL 75 ML: 350 INJECTION, SOLUTION INTRAVENOUS at 06:01

## 2023-01-07 RX ADMIN — ALPRAZOLAM 0.5 MG: 0.25 TABLET ORAL at 11:01

## 2023-01-07 RX ADMIN — ENOXAPARIN SODIUM 40 MG: 40 INJECTION SUBCUTANEOUS at 11:01

## 2023-01-07 RX ADMIN — AMITRIPTYLINE HYDROCHLORIDE 40 MG: 10 TABLET, FILM COATED ORAL at 11:01

## 2023-01-07 RX ADMIN — DOCUSATE SODIUM 50 MG: 50 LIQUID ORAL at 08:01

## 2023-01-07 RX ADMIN — ACETAMINOPHEN 650 MG: 325 TABLET ORAL at 07:01

## 2023-01-07 RX ADMIN — ONDANSETRON 4 MG: 2 INJECTION INTRAMUSCULAR; INTRAVENOUS at 11:01

## 2023-01-07 NOTE — ED PROVIDER NOTES
Encounter Date: 2023       History     Chief Complaint   Patient presents with    Hearing Problem     Facial droop / started 1400 today  / hx. TIA's      HPI 57-year-old woman history of 2 previous strokes, MI, multiple TIAs in the past presents emergency department with her  complaining of acute onset of hearing loss in the left ear, left facial droop and  balling her hands up in a fist  year right that occurred approximately 2:00 p.m. today.   states that last time this happened 2017 she experienced 2 years worth of retrograde amnesia fReview of patient's allergies indicates:  No Known Allergies  Past Medical History:   Diagnosis Date    MI (myocardial infarction)     Stroke      Past Surgical History:   Procedure Laterality Date     SECTION      CORONARY ARTERY BYPASS GRAFT       Family History   Problem Relation Age of Onset    Asthma Mother     Cancer Father     Heart attack Maternal Grandfather     Breast cancer Maternal Grandmother      Social History     Tobacco Use    Smoking status: Never    Smokeless tobacco: Never   Substance Use Topics    Alcohol use: No     Alcohol/week: 0.0 standard drinks    Drug use: No     Review of Systems   Constitutional:  Negative for fever.   HENT:  Positive for ear pain. Negative for sore throat.    Respiratory:  Negative for shortness of breath.    Cardiovascular:  Negative for chest pain.   Gastrointestinal:  Negative for nausea.   Genitourinary:  Negative for dysuria.   Musculoskeletal:  Negative for back pain.   Skin:  Negative for rash.   Neurological:  Negative for weakness.   Hematological:  Does not bruise/bleed easily.     Physical Exam     Initial Vitals [23 1747]   BP Pulse Resp Temp SpO2   134/67 87 20 -- 98 %      MAP       --         Physical Exam    Constitutional: Vital signs are normal. She appears well-developed and well-nourished.  Non-toxic appearance. No distress.   HENT:   Head: Normocephalic and atraumatic.   Left Ear: No  drainage, swelling or tenderness. Decreased hearing (cerumen impaction in the left ear) is noted.   Eyes: EOM are normal. Pupils are equal, round, and reactive to light.   Neck: Neck supple. No JVD present.   Normal range of motion.  Cardiovascular:  Normal rate, regular rhythm, normal heart sounds and intact distal pulses.     Exam reveals no gallop and no friction rub.       No murmur heard.  Pulmonary/Chest: Breath sounds normal. She has no wheezes. She has no rhonchi. She has no rales.   Abdominal: Abdomen is soft. Bowel sounds are normal. There is no abdominal tenderness. There is no rebound and no guarding.   Musculoskeletal:         General: Normal range of motion.      Cervical back: Normal range of motion and neck supple. No rigidity.     Neurological: She is alert and oriented to person, place, and time. She has normal strength and normal reflexes. A cranial nerve deficit (hearing loss in left ear) is present. No sensory deficit. She exhibits normal muscle tone. Coordination normal. GCS eye subscore is 4. GCS verbal subscore is 5. GCS motor subscore is 6.   Skin: Skin is warm and dry.   Psychiatric: She has a normal mood and affect. Her speech is normal and behavior is normal. She is not actively hallucinating.       ED Course   Procedures  Labs Reviewed   CBC W/ AUTO DIFFERENTIAL - Abnormal; Notable for the following components:       Result Value    RBC 3.65 (*)     Hemoglobin 11.4 (*)     Hematocrit 34.0 (*)     MCH 31.2 (*)     Eos # 0.6 (*)     Eosinophil % 9.6 (*)     All other components within normal limits   COMPREHENSIVE METABOLIC PANEL - Abnormal; Notable for the following components:    Sodium 135 (*)     CO2 22 (*)     Glucose 152 (*)     All other components within normal limits   POCT GLUCOSE - Abnormal; Notable for the following components:    POCT Glucose 177 (*)     All other components within normal limits   PROTIME-INR   TSH   LIPID PANEL   ISTAT CREATININE   ISTAT PROCEDURE   POCT  PROTIME-INR   POCT CREATININE     EKG Readings: (Independently Interpreted)   Initial Reading: No STEMI.    Normal sinus rhythm, 83 beats per minute.  Nonspecific T-wave abnormality.  Prolonged QT     Imaging Results              CTA Head and Neck (xpd) (In process)                      Medications   iohexoL (OMNIPAQUE 350) injection 75 mL (75 mLs Intravenous Given 1/7/23 1803)   acetaminophen tablet 650 mg (650 mg Oral Given 1/7/23 1913)   docusate 50 mg/5 mL liquid 50 mg (50 mg Otic Given 1/7/23 2005)     Medical Decision Making:   History:   Old Medical Records: I decided to obtain old medical records.  Initial Assessment:    57 year woman that presents emergency department for evaluation of sudden onset of left hearing loss with left facial droop.   states that she was acting anxious and writing her hands and putting them in a fist which is the same thing happened last time she had a stroke.  On examination she does have decreased hearing in the left ear but I do not appreciate any facial droop or weakness.  Stroke code initiated .  CT was read negative by myself at 6:05 p.m..  Patient was evaluated by Neurology and recommends no tPA due to likely stroke mimic.  On reexamination of patient had time to examine her ear and discovered she had a cerumen impaction this was removed and patient had return of normal hearing.  Recommendation by Neurology to perform MRI of brain with and without contrast with special focus on the internal auditory canal.  Discussed Hospital Medicine agrees to obser te patint to obtain MRI in the morning for neuro checks.                          Clinical Impression:   Final diagnoses:  [I63.9] Stroke  [R29.810] Facial droop (Primary)  [H61.22] Impacted cerumen of left ear        ED Disposition Condition    Observation Stable                Robert Quesada MD  01/07/23 4767

## 2023-01-08 LAB
ALBUMIN SERPL BCP-MCNC: 3.5 G/DL (ref 3.5–5.2)
ALP SERPL-CCNC: 97 U/L (ref 55–135)
ALT SERPL W/O P-5'-P-CCNC: 19 U/L (ref 10–44)
ANION GAP SERPL CALC-SCNC: 8 MMOL/L (ref 8–16)
APTT BLDCRRT: 27.6 SEC (ref 21–32)
AST SERPL-CCNC: 23 U/L (ref 10–40)
BASOPHILS # BLD AUTO: 0.05 K/UL (ref 0–0.2)
BASOPHILS NFR BLD: 1 % (ref 0–1.9)
BILIRUB SERPL-MCNC: 0.6 MG/DL (ref 0.1–1)
BUN SERPL-MCNC: 7 MG/DL (ref 6–20)
CALCIUM SERPL-MCNC: 9.4 MG/DL (ref 8.7–10.5)
CHLORIDE SERPL-SCNC: 105 MMOL/L (ref 95–110)
CK MB SERPL-MCNC: 0.4 NG/ML (ref 0.1–6.5)
CK MB SERPL-RTO: 0.8 % (ref 0–5)
CK SERPL-CCNC: 51 U/L (ref 20–180)
CO2 SERPL-SCNC: 27 MMOL/L (ref 23–29)
CREAT SERPL-MCNC: 0.8 MG/DL (ref 0.5–1.4)
DIFFERENTIAL METHOD: ABNORMAL
EOSINOPHIL # BLD AUTO: 0.4 K/UL (ref 0–0.5)
EOSINOPHIL NFR BLD: 8.5 % (ref 0–8)
ERYTHROCYTE [DISTWIDTH] IN BLOOD BY AUTOMATED COUNT: 12.3 % (ref 11.5–14.5)
EST. GFR  (NO RACE VARIABLE): >60 ML/MIN/1.73 M^2
ESTIMATED AVG GLUCOSE: 108 MG/DL (ref 68–131)
GLUCOSE SERPL-MCNC: 91 MG/DL (ref 70–110)
HBA1C MFR BLD: 5.4 % (ref 4–5.6)
HCT VFR BLD AUTO: 35.2 % (ref 37–48.5)
HGB BLD-MCNC: 11.4 G/DL (ref 12–16)
IMM GRANULOCYTES # BLD AUTO: 0.01 K/UL (ref 0–0.04)
IMM GRANULOCYTES NFR BLD AUTO: 0.2 % (ref 0–0.5)
INR PPP: 1 (ref 0.8–1.2)
LYMPHOCYTES # BLD AUTO: 2.3 K/UL (ref 1–4.8)
LYMPHOCYTES NFR BLD: 48.5 % (ref 18–48)
MAGNESIUM SERPL-MCNC: 2 MG/DL (ref 1.6–2.6)
MCH RBC QN AUTO: 30.7 PG (ref 27–31)
MCHC RBC AUTO-ENTMCNC: 32.4 G/DL (ref 32–36)
MCV RBC AUTO: 95 FL (ref 82–98)
MONOCYTES # BLD AUTO: 0.4 K/UL (ref 0.3–1)
MONOCYTES NFR BLD: 8.3 % (ref 4–15)
NEUTROPHILS # BLD AUTO: 1.6 K/UL (ref 1.8–7.7)
NEUTROPHILS NFR BLD: 33.5 % (ref 38–73)
NRBC BLD-RTO: 0 /100 WBC
PHOSPHATE SERPL-MCNC: 3.8 MG/DL (ref 2.7–4.5)
PLATELET # BLD AUTO: 217 K/UL (ref 150–450)
PMV BLD AUTO: 9.7 FL (ref 9.2–12.9)
POTASSIUM SERPL-SCNC: 3.9 MMOL/L (ref 3.5–5.1)
PROT SERPL-MCNC: 6.6 G/DL (ref 6–8.4)
PROTHROMBIN TIME: 10.8 SEC (ref 9–12.5)
RBC # BLD AUTO: 3.71 M/UL (ref 4–5.4)
SODIUM SERPL-SCNC: 140 MMOL/L (ref 136–145)
TROPONIN I SERPL DL<=0.01 NG/ML-MCNC: <0.006 NG/ML (ref 0–0.03)
WBC # BLD AUTO: 4.8 K/UL (ref 3.9–12.7)

## 2023-01-08 PROCEDURE — A9585 GADOBUTROL INJECTION: HCPCS | Performed by: STUDENT IN AN ORGANIZED HEALTH CARE EDUCATION/TRAINING PROGRAM

## 2023-01-08 PROCEDURE — 85025 COMPLETE CBC W/AUTO DIFF WBC: CPT | Performed by: NURSE PRACTITIONER

## 2023-01-08 PROCEDURE — 36415 COLL VENOUS BLD VENIPUNCTURE: CPT | Performed by: NURSE PRACTITIONER

## 2023-01-08 PROCEDURE — 84484 ASSAY OF TROPONIN QUANT: CPT | Performed by: NURSE PRACTITIONER

## 2023-01-08 PROCEDURE — 85610 PROTHROMBIN TIME: CPT | Performed by: NURSE PRACTITIONER

## 2023-01-08 PROCEDURE — 25000003 PHARM REV CODE 250: Performed by: NURSE PRACTITIONER

## 2023-01-08 PROCEDURE — 63600175 PHARM REV CODE 636 W HCPCS: Performed by: NURSE PRACTITIONER

## 2023-01-08 PROCEDURE — 83735 ASSAY OF MAGNESIUM: CPT | Performed by: NURSE PRACTITIONER

## 2023-01-08 PROCEDURE — 97165 OT EVAL LOW COMPLEX 30 MIN: CPT

## 2023-01-08 PROCEDURE — 94761 N-INVAS EAR/PLS OXIMETRY MLT: CPT

## 2023-01-08 PROCEDURE — 80053 COMPREHEN METABOLIC PANEL: CPT | Performed by: NURSE PRACTITIONER

## 2023-01-08 PROCEDURE — 25500020 PHARM REV CODE 255: Performed by: STUDENT IN AN ORGANIZED HEALTH CARE EDUCATION/TRAINING PROGRAM

## 2023-01-08 PROCEDURE — 82553 CREATINE MB FRACTION: CPT | Performed by: NURSE PRACTITIONER

## 2023-01-08 PROCEDURE — 85730 THROMBOPLASTIN TIME PARTIAL: CPT | Performed by: NURSE PRACTITIONER

## 2023-01-08 PROCEDURE — 84100 ASSAY OF PHOSPHORUS: CPT | Performed by: NURSE PRACTITIONER

## 2023-01-08 PROCEDURE — 96372 THER/PROPH/DIAG INJ SC/IM: CPT | Performed by: NURSE PRACTITIONER

## 2023-01-08 PROCEDURE — G0378 HOSPITAL OBSERVATION PER HR: HCPCS

## 2023-01-08 RX ORDER — GADOBUTROL 604.72 MG/ML
8 INJECTION INTRAVENOUS
Status: COMPLETED | OUTPATIENT
Start: 2023-01-08 | End: 2023-01-08

## 2023-01-08 RX ORDER — ACETAMINOPHEN 325 MG/1
650 TABLET ORAL EVERY 6 HOURS PRN
Status: DISCONTINUED | OUTPATIENT
Start: 2023-01-08 | End: 2023-01-09 | Stop reason: HOSPADM

## 2023-01-08 RX ORDER — ACETAMINOPHEN 325 MG/1
650 TABLET ORAL ONCE
Status: COMPLETED | OUTPATIENT
Start: 2023-01-08 | End: 2023-01-08

## 2023-01-08 RX ORDER — CLOPIDOGREL BISULFATE 75 MG/1
75 TABLET ORAL DAILY
Status: DISCONTINUED | OUTPATIENT
Start: 2023-01-09 | End: 2023-01-09 | Stop reason: HOSPADM

## 2023-01-08 RX ADMIN — ACETAMINOPHEN 650 MG: 325 TABLET ORAL at 08:01

## 2023-01-08 RX ADMIN — AMITRIPTYLINE HYDROCHLORIDE 40 MG: 10 TABLET, FILM COATED ORAL at 10:01

## 2023-01-08 RX ADMIN — ATORVASTATIN CALCIUM 20 MG: 20 TABLET, FILM COATED ORAL at 08:01

## 2023-01-08 RX ADMIN — PANTOPRAZOLE SODIUM 40 MG: 40 TABLET, DELAYED RELEASE ORAL at 08:01

## 2023-01-08 RX ADMIN — ACETAMINOPHEN 650 MG: 325 TABLET ORAL at 07:01

## 2023-01-08 RX ADMIN — METOPROLOL TARTRATE 50 MG: 50 TABLET, FILM COATED ORAL at 08:01

## 2023-01-08 RX ADMIN — METOPROLOL TARTRATE 50 MG: 50 TABLET, FILM COATED ORAL at 10:01

## 2023-01-08 RX ADMIN — MONTELUKAST 10 MG: 10 TABLET, FILM COATED ORAL at 08:01

## 2023-01-08 RX ADMIN — ALPRAZOLAM 0.5 MG: 0.25 TABLET ORAL at 08:01

## 2023-01-08 RX ADMIN — GADOBUTROL 8 ML: 604.72 INJECTION INTRAVENOUS at 09:01

## 2023-01-08 RX ADMIN — ALPRAZOLAM 0.5 MG: 0.25 TABLET ORAL at 10:01

## 2023-01-08 RX ADMIN — ASPIRIN 81 MG CHEWABLE TABLET 81 MG: 81 TABLET CHEWABLE at 08:01

## 2023-01-08 RX ADMIN — ENOXAPARIN SODIUM 40 MG: 40 INJECTION SUBCUTANEOUS at 07:01

## 2023-01-08 NOTE — CARE UPDATE
01/08/23 0756   Patient Assessment/Suction   Level of Consciousness (AVPU) alert   PRE-TX-O2   Device (Oxygen Therapy) room air   SpO2 98 %   Pulse Oximetry Type Intermittent   $ Pulse Oximetry - Multiple Charge Pulse Oximetry - Multiple   Pulse 68   Resp 18

## 2023-01-08 NOTE — HPI
Sudden onset of left hearing loss associated with right hand spasticity, mild left ear pain and left facial droop. LKN 2pm today      Past Medical History:   Diagnosis Date    MI (myocardial infarction)     Stroke

## 2023-01-08 NOTE — PLAN OF CARE
1. Patient will perform UE dressing with mod I  2. Patient will perform LE dressing with mod I  3. Patient will perform toileting with SBA  4. Patient will perform BSC t/f with SBA

## 2023-01-08 NOTE — SUBJECTIVE & OBJECTIVE
"  Woke up with symptoms?: no    Recent bleeding noted: no  Does the patient take any Blood Thinners? no  Medications: No relevant medications      Past Medical History: hyperlipidemia    Past Surgical History: none    Family History: no relevant history    Social History: no smoking, no drinking, no drugs    Allergies: No Known Allergies     Review of Systems   HENT: Positive for hearing loss.    Neurological: Positive for facial asymmetry.   All other systems reviewed and are negative.    Objective:   Vitals: Blood pressure 134/67, pulse 87, resp. rate 20, height 5' 4" (1.626 m), weight 86.2 kg (190 lb), last menstrual period 10/12/2012, SpO2 98 %.     CT READ: Yes  No hemmorhage. No mass effect. No early infarct signs.     Physical Exam  Vitals reviewed.   Constitutional:       Appearance: Normal appearance.   HENT:      Head: Normocephalic and atraumatic.   Eyes:      Extraocular Movements: Extraocular movements intact.      Pupils: Pupils are equal, round, and reactive to light.   Pulmonary:      Effort: Pulmonary effort is normal.   Neurological:      Mental Status: She is alert and oriented to person, place, and time.           "

## 2023-01-08 NOTE — ASSESSMENT & PLAN NOTE
Acute problem   Facial droop  Antithrombotics for secondary stroke prevention: Antiplatelets: Aspirin: 81 mg daily    Statins for secondary stroke prevention and hyperlipidemia, if present:   Statins: Atorvastatin- 40 mg daily    Aggressive risk factor modification: HTN, Obesity     Rehab efforts: The patient has been evaluated by a stroke team provider and the therapy needs have been fully considered based off the presenting complaints and exam findings. The following therapy evaluations are needed: PT evaluate and treat, OT evaluate and treat, SLP evaluate and treat    Diagnostics ordered/pending: CTA Head to assess vasculature , CTA Neck/Arch to assess vasculature, HgbA1C to assess blood glucose levels, MRA head to assess vasculature, MRI head without contrast to assess brain parenchyma, TSH to assess thyroid function    VTE prophylaxis: Mechanical prophylaxis: Place SCDs    BP parameters: TIA: SBP <220 until imaging confirmation of no infarct

## 2023-01-08 NOTE — ASSESSMENT & PLAN NOTE
Acute problem   Facial droop  Antithrombotics for secondary stroke prevention: Antiplatelets: Aspirin: 81 mg daily    Statins for secondary stroke prevention and hyperlipidemia, if present:   Statins: Atorvastatin- 40 mg daily    Aggressive risk factor modification: HTN, Obesity     Rehab efforts: The patient has been evaluated by a stroke team provider and the therapy needs have been fully considered based off the presenting complaints and exam findings. The following therapy evaluations are needed: PT evaluate and treat, OT evaluate and treat, SLP evaluate and treat    Diagnostics ordered/pending: CTA Head to assess vasculature , CTA Neck/Arch to assess vasculature, HgbA1C to assess blood glucose levels, MRA head to assess vasculature, MRI head without contrast to assess brain parenchyma, TSH to assess thyroid function    VTE prophylaxis: Mechanical prophylaxis: Place SCDs    BP parameters: TIA: SBP <220 until imaging confirmation of no infarct     MRI pending

## 2023-01-08 NOTE — H&P
[facility] Hospital Medicine  History & Physical    Patient Name: Renée Goff  MRN: [id number]  Patient Class: OP- Observation  Admission Date: 2023  Attending Physician: Papo Trejo MD   Primary Care Provider: Thomas Khan MD         Patient information was obtained from patient, past medical records and ER records.     Subjective:     Principal Problem:CVA (cerebral vascular accident)    Chief Complaint:   Chief Complaint   Patient presents with    Hearing Problem     Facial droop / started 1400 today  / hx. TIA's         HPI: Renée Goff is a 57-year-old female who presents emergency room for evaluation of acute left hearing loss with left facial droop.  Patient reported that while at rest she noticed the sudden onset of loss of hearing from the left ear.  She denied any ear pain.  Her  noticed she had a small amount of left facial drooping at the same time.  Patient also endorse some paresthesias in the left hand at the time of this incident.  Patient denies any other complaints.  She denies any fever or chills.  No known sick contacts or travel.  She is vaccinated for COVID.  Previous medical history includes obesity, prior CVA, hyperlipidemia, anemia, thrombocytopenia, and acute MI.  ER workup:  CBC unremarkable.  CMP with glucose 152 otherwise unremarkable.  CTA of the head and neck were negative for any acute findings.  ER physician spoke with Neurology who recommended MRI with and without contrast in the a.m. with internal auditory canal protocol.  ER physician performed a cerumen impaction removal with resolution of her hearing loss.  Patient admitted to Hospital Medicine for treatment management.  Patient placed on CVA pathway.  Neurology consult in a.m..      Past Medical History:   Diagnosis Date    MI (myocardial infarction)     Stroke        Past Surgical History:   Procedure Laterality Date     SECTION      CORONARY ARTERY BYPASS GRAFT          Review of patient's allergies indicates:  No Known Allergies    No current facility-administered medications on file prior to encounter.     Current Outpatient Medications on File Prior to Encounter   Medication Sig    ferrous sulfate (FEOSOL) 325 mg (65 mg iron) Tab tablet 1 TABLET BY MOUTH 2 TIMES DAILY. TRY ON EMPTY STOMACH FIRST    losartan (COZAAR) 25 MG tablet Take 1 tablet (25 mg total) by mouth every evening.    albuterol (PROVENTIL/VENTOLIN HFA) 90 mcg/actuation inhaler 2 puffs every 4 hours as needed for cough, wheeze, or shortness of breath    ALPRAZolam (XANAX) 0.5 MG tablet TAKE 1 TABLET BY MOUTH TWICE A DAY    amitriptyline (ELAVIL) 10 MG tablet Take 4 tablets (40 mg total) by mouth every evening.    aspirin 81 MG Chew Take 1 tablet (81 mg total) by mouth once daily.    atorvastatin (LIPITOR) 20 MG tablet TAKE 1 TABLET BY MOUTH EVERY DAY    butalbital-acetaminophen-caffeine -40 mg (FIORICET, ESGIC) -40 mg per tablet Take 1 tablet by mouth every 4 (four) hours as needed for Pain.    cyclobenzaprine (FLEXERIL) 10 MG tablet Take 1 tablet (10 mg total) by mouth 3 (three) times daily as needed for Muscle spasms.    fluticasone propionate (FLONASE) 50 mcg/actuation nasal spray 2 sprays (100 mcg total) by Each Nostril route once daily.    metoprolol tartrate (LOPRESSOR) 50 MG tablet TAKE 1 TABLET BY MOUTH 2 TIMES A DAY    montelukast (SINGULAIR) 10 mg tablet TAKE 1 TABLET BY MOUTH EVERY DAY    multivitamin (THERAGRAN) per tablet Take 1 tablet by mouth once daily.    pantoprazole (PROTONIX) 40 MG tablet TAKE 1 TABLET BY MOUTH EVERY DAY     Family History       Problem Relation (Age of Onset)    Asthma Mother    Breast cancer Maternal Grandmother    Cancer Father    Heart attack Maternal Grandfather          Tobacco Use    Smoking status: Never    Smokeless tobacco: Never   Substance and Sexual Activity    Alcohol use: No     Alcohol/week: 0.0 standard drinks    Drug use: No     Sexual activity: Yes     Partners: Male     Review of Systems   Constitutional:  Negative for activity change, chills, diaphoresis and fever.   HENT:  Negative for congestion, nosebleeds and tinnitus.    Eyes:  Negative for photophobia and visual disturbance.   Respiratory:  Negative for cough, chest tightness, shortness of breath and wheezing.    Cardiovascular:  Negative for chest pain, palpitations and leg swelling.   Gastrointestinal:  Negative for abdominal distention, abdominal pain, constipation, diarrhea, nausea and vomiting.   Endocrine: Negative for cold intolerance and heat intolerance.   Genitourinary:  Negative for difficulty urinating, dysuria, frequency, hematuria and urgency.   Musculoskeletal:  Negative for arthralgias, back pain and myalgias.   Skin:  Negative for pallor, rash and wound.   Allergic/Immunologic: Negative for immunocompromised state.   Neurological:  Positive for facial asymmetry, weakness, light-headedness, numbness and headaches. Negative for dizziness, tremors and speech difficulty.   Hematological:  Negative for adenopathy. Does not bruise/bleed easily.   Psychiatric/Behavioral:  Negative for confusion and sleep disturbance. The patient is not nervous/anxious.    Objective:     Vital Signs (Most Recent):  Temp: 98 °F (36.7 °C) (01/07/23 2233)  Pulse: 72 (01/07/23 2233)  Resp: 18 (01/07/23 2233)  BP: (!) 142/72 (01/07/23 2233)  SpO2: 98 % (01/07/23 2233)   Vital Signs (24h Range):  Temp:  [98 °F (36.7 °C)] 98 °F (36.7 °C)  Pulse:  [61-87] 72  Resp:  [16-20] 18  SpO2:  [97 %-100 %] 98 %  BP: (117-152)/(67-86) 142/72     Weight: 86.2 kg (190 lb)  Body mass index is 32.61 kg/m².    Physical Exam  Vitals and nursing note reviewed.   Constitutional:       General: She is not in acute distress.     Appearance: She is well-developed. She is not ill-appearing, toxic-appearing or diaphoretic.   HENT:      Head: Normocephalic.      Mouth/Throat:      Mouth: Mucous membranes are moist.       Pharynx: Oropharynx is clear.   Eyes:      General: No scleral icterus.     Conjunctiva/sclera: Conjunctivae normal.      Pupils: Pupils are equal, round, and reactive to light.   Neck:      Vascular: No JVD.   Cardiovascular:      Rate and Rhythm: Normal rate and regular rhythm.      Heart sounds: Normal heart sounds. No murmur heard.    No friction rub. No gallop.   Pulmonary:      Effort: Pulmonary effort is normal. No respiratory distress.      Breath sounds: Normal breath sounds. No wheezing or rales.   Abdominal:      General: Bowel sounds are normal. There is no distension.      Palpations: Abdomen is soft.      Tenderness: There is no abdominal tenderness. There is no guarding or rebound.   Musculoskeletal:         General: No tenderness. Normal range of motion.      Cervical back: Normal range of motion and neck supple.   Lymphadenopathy:      Cervical: No cervical adenopathy.   Skin:     General: Skin is warm and dry.      Capillary Refill: Capillary refill takes less than 2 seconds.      Coloration: Skin is not pale.      Findings: No erythema or rash.   Neurological:      Mental Status: She is alert and oriented to person, place, and time.      Cranial Nerves: No cranial nerve deficit.      Sensory: No sensory deficit.      Coordination: Coordination normal.      Deep Tendon Reflexes: Reflexes normal.   Psychiatric:         Behavior: Behavior normal.         Thought Content: Thought content normal.         Judgment: Judgment normal.         CRANIAL NERVES     CN III, IV, VI   Pupils are equal, round, and reactive to light.     Significant Labs: All pertinent labs within the past 24 hours have been reviewed.  CBC:   Recent Labs   Lab 01/07/23  1814   WBC 6.36   HGB 11.4*   HCT 34.0*        CMP:   Recent Labs   Lab 01/07/23  1814   *   K 3.5      CO2 22*   *   BUN 8   CREATININE 0.9   CALCIUM 9.0   PROT 6.7   ALBUMIN 3.5   BILITOT 0.4   ALKPHOS 95   AST 23   ALT 19   ANIONGAP  10       Significant Imaging: I have reviewed all pertinent imaging results/findings within the past 24 hours.    Assessment/Plan:     * CVA (cerebral vascular accident)  Acute problem   Facial droop  Antithrombotics for secondary stroke prevention: Antiplatelets: Aspirin: 81 mg daily    Statins for secondary stroke prevention and hyperlipidemia, if present:   Statins: Atorvastatin- 40 mg daily    Aggressive risk factor modification: HTN, Obesity     Rehab efforts: The patient has been evaluated by a stroke team provider and the therapy needs have been fully considered based off the presenting complaints and exam findings. The following therapy evaluations are needed: PT evaluate and treat, OT evaluate and treat, SLP evaluate and treat    Diagnostics ordered/pending: CTA Head to assess vasculature , CTA Neck/Arch to assess vasculature, HgbA1C to assess blood glucose levels, MRA head to assess vasculature, MRI head without contrast to assess brain parenchyma, TSH to assess thyroid function    VTE prophylaxis: Mechanical prophylaxis: Place SCDs    BP parameters: TIA: SBP <220 until imaging confirmation of no infarct           Hearing loss of left ear  Acute problem  ER physician performed cerumen impaction removal.    Hearing loss resolved         Thrombocytopenia  Chronic problem   Trend platelets         Hypercholesterolemia, baseline .6  Chronic problem   Stable        VTE Risk Mitigation (From admission, onward)         Ordered     enoxaparin injection 40 mg  Daily         01/07/23 2202     IP VTE HIGH RISK PATIENT  Once         01/07/23 2202     Place sequential compression device  Until discontinued         01/07/23 2202     Place ELISSA hose  Until discontinued         01/07/23 2202                   Frde Landa NP  Department of Hospital Medicine   [facility]

## 2023-01-08 NOTE — PLAN OF CARE
Problem: Adjustment to Illness (Stroke, Ischemic/Transient Ischemic Attack)  Goal: Optimal Coping  Outcome: Ongoing, Progressing     Problem: Functional Ability Impaired (Stroke, Ischemic/Transient Ischemic Attack)  Goal: Optimal Functional Ability  Outcome: Ongoing, Progressing

## 2023-01-08 NOTE — PROGRESS NOTES
[facility] Hospital Medicine  Progress Note    Patient Name: Renée Goff  MRN: [id number]  Patient Class: OP- Observation   Admission Date: 1/7/2023  Length of Stay: 0 days  Attending Physician: Papo Trejo MD  Primary Care Provider: Thomas Khan MD        Subjective:     Principal Problem:CVA (cerebral vascular accident)        HPI:  Renée Goff is a 57-year-old female who presents emergency room for evaluation of acute left hearing loss with left facial droop.  Patient reported that while at rest she noticed the sudden onset of loss of hearing from the left ear.  She denied any ear pain.  Her  noticed she had a small amount of left facial drooping at the same time.  Patient also endorse some paresthesias in the left hand at the time of this incident.  Patient denies any other complaints.  She denies any fever or chills.  No known sick contacts or travel.  She is vaccinated for COVID.  Previous medical history includes obesity, prior CVA, hyperlipidemia, anemia, thrombocytopenia, and acute MI.  ER workup:  CBC unremarkable.  CMP with glucose 152 otherwise unremarkable.  CTA of the head and neck were negative for any acute findings.  ER physician spoke with Neurology who recommended MRI with and without contrast in the a.m. with internal auditory canal protocol.  ER physician performed a cerumen impaction removal with resolution of her hearing loss.  Patient admitted to Hospital Medicine for treatment management.  Patient placed on CVA pathway.  Neurology consult in a.m..      Overview/Hospital Course:  No notes on file    Interval History: Notes reviewed, no acute events overnight. Patient seen this morning resting in bed with no acute distress. She complains of left frontal headache this morning. States hearing loss has resolved. She denies any vision changes, weakness, numbness, or facial droop. MRI imaging pending. Neurology consult pending.     Review of Systems    Constitutional:  Negative for chills, fatigue and fever.   Respiratory:  Negative for shortness of breath and wheezing.    Cardiovascular:  Negative for chest pain and palpitations.   Gastrointestinal:  Negative for abdominal distention, abdominal pain, nausea and vomiting.   Genitourinary:  Negative for difficulty urinating, dysuria and frequency.   Musculoskeletal:  Negative for arthralgias and myalgias.   Neurological:  Positive for headaches. Negative for dizziness, facial asymmetry, weakness and numbness.   Psychiatric/Behavioral:  Negative for behavioral problems and confusion.    All other systems reviewed and are negative.  Objective:     Vital Signs (Most Recent):  Temp: 97.7 °F (36.5 °C) (01/08/23 0800)  Pulse: 61 (01/08/23 0800)  Resp: 16 (01/08/23 0800)  BP: (!) 106/56 (01/08/23 0800)  SpO2: 96 % (01/08/23 0800)   Vital Signs (24h Range):  Temp:  [97.1 °F (36.2 °C)-98 °F (36.7 °C)] 97.7 °F (36.5 °C)  Pulse:  [60-87] 61  Resp:  [16-20] 16  SpO2:  [96 %-100 %] 96 %  BP: (106-152)/(55-86) 106/56     Weight: 83.9 kg (185 lb)  Body mass index is 31.76 kg/m².  No intake or output data in the 24 hours ending 01/08/23 1026   Physical Exam  Vitals and nursing note reviewed.   Constitutional:       General: She is not in acute distress.     Appearance: Normal appearance. She is not ill-appearing.   HENT:      Head: Normocephalic and atraumatic.   Eyes:      Extraocular Movements: Extraocular movements intact.      Pupils: Pupils are equal, round, and reactive to light.   Cardiovascular:      Rate and Rhythm: Normal rate and regular rhythm.      Pulses: Normal pulses.      Heart sounds: Normal heart sounds. No murmur heard.    No gallop.   Pulmonary:      Effort: Pulmonary effort is normal. No respiratory distress.      Breath sounds: No wheezing or rales.   Abdominal:      General: Abdomen is flat. There is no distension.      Palpations: Abdomen is soft.      Tenderness: There is no abdominal tenderness.    Musculoskeletal:         General: No swelling or tenderness. Normal range of motion.   Neurological:      General: No focal deficit present.      Mental Status: She is alert and oriented to person, place, and time.      Cranial Nerves: No cranial nerve deficit, dysarthria or facial asymmetry.      Motor: No weakness.      Coordination: Coordination normal.      Comments: Slightly decreased 2 point discrimination to left upper extremity    Psychiatric:         Mood and Affect: Mood normal.       Significant Labs: All pertinent labs within the past 24 hours have been reviewed.  CBC:   Recent Labs   Lab 01/07/23  1814 01/08/23  0443   WBC 6.36 4.80   HGB 11.4* 11.4*   HCT 34.0* 35.2*    217     CMP:   Recent Labs   Lab 01/07/23  1814 01/08/23  0443   * 140   K 3.5 3.9    105   CO2 22* 27   * 91   BUN 8 7   CREATININE 0.9 0.8   CALCIUM 9.0 9.4   PROT 6.7 6.6   ALBUMIN 3.5 3.5   BILITOT 0.4 0.6   ALKPHOS 95 97   AST 23 23   ALT 19 19   ANIONGAP 10 8       Significant Imaging: I have reviewed all pertinent imaging results/findings within the past 24 hours.      Assessment/Plan:      * CVA (cerebral vascular accident)  Acute problem   Facial droop  Antithrombotics for secondary stroke prevention: Antiplatelets: Aspirin: 81 mg daily    Statins for secondary stroke prevention and hyperlipidemia, if present:   Statins: Atorvastatin- 40 mg daily    Aggressive risk factor modification: HTN, Obesity     Rehab efforts: The patient has been evaluated by a stroke team provider and the therapy needs have been fully considered based off the presenting complaints and exam findings. The following therapy evaluations are needed: PT evaluate and treat, OT evaluate and treat, SLP evaluate and treat    Diagnostics ordered/pending: CTA Head to assess vasculature , CTA Neck/Arch to assess vasculature, HgbA1C to assess blood glucose levels, MRA head to assess vasculature, MRI head without contrast to assess brain  parenchyma, TSH to assess thyroid function    VTE prophylaxis: Mechanical prophylaxis: Place SCDs    BP parameters: TIA: SBP <220 until imaging confirmation of no infarct     MRI pending       Hearing loss of left ear  Acute problem  ER physician performed cerumen impaction removal.    Hearing loss resolved         Thrombocytopenia  Chronic problem   Trend platelets         Hypercholesterolemia, baseline .6  Chronic problem   Stable        VTE Risk Mitigation (From admission, onward)         Ordered     enoxaparin injection 40 mg  Daily         01/07/23 2202     IP VTE HIGH RISK PATIENT  Once         01/07/23 2202     Place sequential compression device  Until discontinued         01/07/23 2202     Place ELISSA hose  Until discontinued         01/07/23 2202                Discharge Planning   SCOTTY:      Code Status: Full Code   Is the patient medically ready for discharge?:     Reason for patient still in hospital (select all that apply): Imaging and Consult recommendations  Discharge Plan A: Home with family                  Matthew Salcedo PA-C  Department of Hospital Medicine   [facility]

## 2023-01-08 NOTE — PT/OT/SLP EVAL
"Occupational Therapy   Evaluation    Name: Renée Goff  MRN: [id number]  Admitting Diagnosis: CVA (cerebral vascular accident)  Recent Surgery: * No surgery found *      Recommendations:     Discharge Recommendations: home health OT  Discharge Equipment Recommendations:  walker, standard  Barriers to discharge:       Assessment:     Renée Goff is a 57 y.o. female with a medical diagnosis of CVA (cerebral vascular accident).  She presents with decreased balance, endurance, functional transfers, and ADL. Performance deficits affecting function: weakness, impaired self care skills, impaired functional mobility, impaired balance.  Patient was working prior to admission and reports independent with all ADL and mobility prior to admission.    Rehab Prognosis: Good; patient would benefit from acute skilled OT services to address these deficits and reach maximum level of function.       Plan:     Patient to be seen 3 x/week to address the above listed problems via self-care/home management, therapeutic activities, therapeutic exercises (functional transfer training)  Plan of Care Expires: 01/08/23  Plan of Care Reviewed with: patient    Subjective     Chief Complaint: decreased balance, weakness, says "feels off" sometimes when she is standing  Patient/Family Comments/goals: return to PLOF and living situation    Occupational Profile:  Living Environment: condo on second floor, carpet and wood surfaces, lives with spouse  Previous level of function: see above  Roles and Routines: full time worker  Equipment Used at Home: none  Assistance upon Discharge: spouse    Pain/Comfort:  Pain Rating 1: 8/10 (headache; she says she hit call bell and told radha/nurse plus i told radha/nurse after eval)    Patients cultural, spiritual, Denominational conflicts given the current situation: no    Objective:     Communicated with: nurse garcia prior to session.  Patient found HOB elevated with bed alarm, telemetry, peripheral IV, Other " (comments) (ELISSA hose both legs) upon OT entry to room.    General Precautions: Standard, fall  Orthopedic Precautions:    Braces:    Respiratory Status: Room air    Occupational Performance:    Bed Mobility:    Patient completed Rolling/Turning to Left with  stand by assistance    Functional Mobility/Transfers:  Patient completed Sit <> Stand Transfer with minimum assistance  with  rolling walker       Activities of Daily Living:  Feeding:  modified independence required  Grooming: modified independence required  Upper Body Dressing: stand by assistance required  Lower Body Dressing: stand by assistance required  Toileting: minimum assistance required    Cognitive/Visual Perceptual:  Cognitive/Psychosocial Skills:     -       Oriented to: Person, Place, Time, and Situation   -       Follows Commands/attention:Follows one-step commands  -       Communication: clear/fluent  -       Memory: No Deficits noted  -       Safety awareness/insight to disability: intact   -       Mood/Affect/Coping skills/emotional control: Appropriate to situation  Visual/Perceptual:      -Intact seems to be the status    Physical Exam:  Balance:    -       static sit good dynamic sit good, static stand fair + dynamic stand fair +  Neurological:    -       tone: WNL bilateral arms, no edema bilateral arms, full AROM bilateral shoulder thru hands,  force strong on both hands, FMC and GMC WNL bilaterally, MMT 4/5 left UE elbow thru wrist, MMT 5/5 right UE elbow thru wrist    AMPAC 6 Click ADL:  AMPAC Total Score: 18    Treatment & Education:  Role of OT, told her to pace herself with activity    Patient left HOB elevated with all lines intact, call button in reach, bed alarm on, and breakfast set up for patient on bedside table    GOALS:   Multidisciplinary Problems       Occupational Therapy Goals          Problem: Occupational Therapy    Goal Priority Disciplines Outcome Interventions   Occupational Therapy Goal     OT, PT/OT      Description: 1. Patient will perform UE dressing with mod I  2. Patient will perform LE dressing with mod I  3. Patient will perform toileting with SBA  4. Patient will perform BSC t/f with SBA                       History:     Past Medical History:   Diagnosis Date    MI (myocardial infarction)     Stroke          Past Surgical History:   Procedure Laterality Date     SECTION      CORONARY ARTERY BYPASS GRAFT         Time Tracking:     OT Date of Treatment:    OT Start Time: 0800  OT Stop Time: 0820  OT Total Time (min): 20 min    Billable Minutes:Evaluation 2023

## 2023-01-08 NOTE — SUBJECTIVE & OBJECTIVE
Interval History: Notes reviewed, no acute events overnight. Patient seen this morning resting in bed with no acute distress. She complains of left frontal headache this morning. States hearing loss has resolved. She denies any vision changes, weakness, numbness, or facial droop. MRI imaging pending. Neurology consult pending.     Review of Systems   Constitutional:  Negative for chills, fatigue and fever.   Respiratory:  Negative for shortness of breath and wheezing.    Cardiovascular:  Negative for chest pain and palpitations.   Gastrointestinal:  Negative for abdominal distention, abdominal pain, nausea and vomiting.   Genitourinary:  Negative for difficulty urinating, dysuria and frequency.   Musculoskeletal:  Negative for arthralgias and myalgias.   Neurological:  Positive for headaches. Negative for dizziness, facial asymmetry, weakness and numbness.   Psychiatric/Behavioral:  Negative for behavioral problems and confusion.    All other systems reviewed and are negative.  Objective:     Vital Signs (Most Recent):  Temp: 97.7 °F (36.5 °C) (01/08/23 0800)  Pulse: 61 (01/08/23 0800)  Resp: 16 (01/08/23 0800)  BP: (!) 106/56 (01/08/23 0800)  SpO2: 96 % (01/08/23 0800)   Vital Signs (24h Range):  Temp:  [97.1 °F (36.2 °C)-98 °F (36.7 °C)] 97.7 °F (36.5 °C)  Pulse:  [60-87] 61  Resp:  [16-20] 16  SpO2:  [96 %-100 %] 96 %  BP: (106-152)/(55-86) 106/56     Weight: 83.9 kg (185 lb)  Body mass index is 31.76 kg/m².  No intake or output data in the 24 hours ending 01/08/23 1026   Physical Exam  Vitals and nursing note reviewed.   Constitutional:       General: She is not in acute distress.     Appearance: Normal appearance. She is not ill-appearing.   HENT:      Head: Normocephalic and atraumatic.   Eyes:      Extraocular Movements: Extraocular movements intact.      Pupils: Pupils are equal, round, and reactive to light.   Cardiovascular:      Rate and Rhythm: Normal rate and regular rhythm.      Pulses: Normal pulses.       Heart sounds: Normal heart sounds. No murmur heard.    No gallop.   Pulmonary:      Effort: Pulmonary effort is normal. No respiratory distress.      Breath sounds: No wheezing or rales.   Abdominal:      General: Abdomen is flat. There is no distension.      Palpations: Abdomen is soft.      Tenderness: There is no abdominal tenderness.   Musculoskeletal:         General: No swelling or tenderness. Normal range of motion.   Neurological:      General: No focal deficit present.      Mental Status: She is alert and oriented to person, place, and time.      Cranial Nerves: No cranial nerve deficit, dysarthria or facial asymmetry.      Motor: No weakness.      Coordination: Coordination normal.      Comments: Slightly decreased 2 point discrimination to left upper extremity    Psychiatric:         Mood and Affect: Mood normal.       Significant Labs: All pertinent labs within the past 24 hours have been reviewed.  CBC:   Recent Labs   Lab 01/07/23  1814 01/08/23  0443   WBC 6.36 4.80   HGB 11.4* 11.4*   HCT 34.0* 35.2*    217     CMP:   Recent Labs   Lab 01/07/23  1814 01/08/23  0443   * 140   K 3.5 3.9    105   CO2 22* 27   * 91   BUN 8 7   CREATININE 0.9 0.8   CALCIUM 9.0 9.4   PROT 6.7 6.6   ALBUMIN 3.5 3.5   BILITOT 0.4 0.6   ALKPHOS 95 97   AST 23 23   ALT 19 19   ANIONGAP 10 8       Significant Imaging: I have reviewed all pertinent imaging results/findings within the past 24 hours.

## 2023-01-08 NOTE — PT/OT/SLP PROGRESS
Physical Therapy      Patient Name:  Renée Goff   MRN:  [id number]    Patient requests to defer evaluation due to headache and nausea. Patient informed nurse (Chrissy).  Will follow-up 01/09.

## 2023-01-08 NOTE — HPI
Renée Goff is a 57-year-old female who presents emergency room for evaluation of acute left hearing loss with left facial droop.  Patient reported that while at rest she noticed the sudden onset of loss of hearing from the left ear.  She denied any ear pain.  Her  noticed she had a small amount of left facial drooping at the same time.  Patient also endorse some paresthesias in the left hand at the time of this incident.  Patient denies any other complaints.  She denies any fever or chills.  No known sick contacts or travel.  She is vaccinated for COVID.  Previous medical history includes obesity, prior CVA, hyperlipidemia, anemia, thrombocytopenia, and acute MI.  ER workup:  CBC unremarkable.  CMP with glucose 152 otherwise unremarkable.  CTA of the head and neck were negative for any acute findings.  ER physician spoke with Neurology who recommended MRI with and without contrast in the a.m. with internal auditory canal protocol.  ER physician performed a cerumen impaction removal with resolution of her hearing loss.  Patient admitted to Hospital Medicine for treatment management.  Patient placed on CVA pathway.  Neurology consult in a.m..

## 2023-01-08 NOTE — PLAN OF CARE
[facility]  Initial Discharge Assessment       Primary Care Provider: Thomas Khan MD    Admission Diagnosis: Facial droop [R29.810]  Stroke [I63.9]  Impacted cerumen of left ear [H61.22]    Admission Date: 1/7/2023  Expected Discharge Date:  Pt confirmed home address and lives with Spouse Cosme Goff [phone]. Pt denied HH and DME. Confirmed PCP as Dr. Khan and insurance as [employer]. [facility] [address] and reports that she drives and independence in ADL's. Pts discharge plan is home and his wife will provide transport. CM following.     Discharge Barriers Identified: None    Payor: [employer] / Plan: [employer] CHOICE POS / Product Type: Commercial /     Extended Emergency Contact Information  Primary Emergency Contact: Cosme Goff  Address: [address] [place] [address] [place]  Mobile Phone: [phone]  Relation: Spouse    Discharge Plan A: Home with family  Discharge Plan B: Home      [id number] [place] - [address]  [address]  [facility] [address]  Phone: [phone] Fax: [phone]    [facility] - [place], [place] - [address]  [address]  [address]  [place] [address]  Phone: [phone] Fax: [phone]      Initial Assessment (most recent)       Adult Discharge Assessment - 01/08/23 1010          Discharge Assessment    Assessment Type Discharge Planning Assessment     Confirmed/corrected address, phone number and insurance Yes     Confirmed Demographics Correct on Facesheet     Source of Information patient     Does patient/caregiver understand observation status Yes     Communicated SCOTTY with patient/caregiver Yes     Reason For Admission Facial droop     People in Home spouse     Facility Arrived From: Home     Do you expect to return to your current living situation? Yes     Do you have help at home or someone to help you manage your care at home? Yes     Who are your  caregiver(s) and their phone number(s)? Spouse Cosme Goff [phone]     Prior to hospitilization cognitive status: Alert/Oriented     Current cognitive status: Alert/Oriented     Home Layout Able to live on 1st floor     Equipment Currently Used at Home none     Readmission within 30 days? No     Patient currently being followed by outpatient case management? No     Do you currently have service(s) that help you manage your care at home? No     Do you take prescription medications? Yes     Do you have prescription coverage? Yes     Do you have any problems affording any of your prescribed medications? No     Is the patient taking medications as prescribed? yes     Who is going to help you get home at discharge? Spouse Cosme Goff [phone]     How do you get to doctors appointments? car, drives self;family or friend will provide     Are you on dialysis? No     Do you take coumadin? No     Discharge Plan A Home with family     Discharge Plan B Home     DME Needed Upon Discharge  none     Discharge Plan discussed with: Patient     Discharge Barriers Identified None        OTHER    Name(s) of People in Home Spouse Cosme Goff [phone]

## 2023-01-08 NOTE — NURSING
Nurses Note -- 4 Eyes      1/8/2023   4:02 AM      Skin assessed during: Admit      [x] No Pressure Injuries Present    []Prevention Measures Documented      [] Yes- Altered Skin Integrity Present or Discovered   [] LDA Added if Not in Epic (Describe Wound)   [] New Altered Skin Integrity was Present on Admit and Documented in LDA   [] Wound Image Taken    Wound Care Consulted? No    Attending Nurse:  Tucker Young RN     Second RN/Staff Member:  Mariaa Avery LPN

## 2023-01-08 NOTE — CONSULTS
[facility]  Vascular Neurology  Comprehensive Stroke Center  TeleVascular Neurology Acute Consultation Note      Consults    Consulting Provider: MARYAM PALMER  Current Providers  No providers found    Patient Location:  [facility] EMERGENCY DEPARTMENT Emergency Department  Spoke hospital nurse at bedside with patient assisting consultant.     Patient information was obtained from spouse/SO.         Assessment/Plan:       Diagnoses:   Hearing loss of left ear  No focal neurological deficits   No obvious cranial nerve deific its that raises suspicion for brainstem stroke  Possible stroke mimic  Consider MRI brain wwo contrast with IAC protocol        STROKE DOCUMENTATION     Acute Stroke Times:   Acute Stroke Times   Last Known Normal Date: 01/07/23  Last Known Normal Time: 1400  Stroke Team Arrival Date: 01/07/23  Stroke Team Arrival Time: 1758  CT Interpretation Time: 1808  CTA Interpretation Time: 1808    NIH Scale:  Interval: baseline  1a. Level of Consciousness: 0-->Alert, keenly responsive  1b. LOC Questions: 0-->Answers both questions correctly  1c. LOC Commands: 0-->Performs both tasks correctly  2. Best Gaze: 0-->Normal  3. Visual: 0-->No visual loss  4. Facial Palsy: 0-->Normal symmetrical movements  5a. Motor Arm, Left: 0-->No drift, limb holds 90 (or 45) degrees for full 10 secs  5b. Motor Arm, Right: 0-->No drift, limb holds 90 (or 45) degrees for full 10 secs  6a. Motor Leg, Left: 1-->Drift, leg falls by the end of the 5-sec period but does not hit bed  6b. Motor Leg, Right: 1-->Drift, leg falls by the end of the 5-sec period but does not hit bed  7. Limb Ataxia: 0-->Absent  8. Sensory: 0-->Normal, no sensory loss  9. Best Language: 0-->No aphasia, normal (Hearing imapriment)  10. Dysarthria: 0-->Normal  11. Extinction and Inattention (formerly Neglect): 0-->No abnormality  Total (NIH Stroke Scale): 2     Modified [place] Score: 0  Terrence Coma Scale:15   ABCD2 Score:  "   [id number] Score:   HAS -BLED Score:   ICH Score:   Hunt & Warren Classification:       Blood pressure 134/67, pulse 87, resp. rate 20, height 5' 4" (1.626 m), weight 86.2 kg (190 lb), last menstrual period 10/12/2012, SpO2 98 %.  Eligible for thrombolytic therapy?: No  Thrombolytic therapy recomended: Thrombolytic therapy not recommended due to Outside of treatment window  and Suspected stroke mimic   Possible Interventional Revascularization Candidate? No; at this time symptoms not suggestive of large vessel occlusion    Disposition Recommendation: admit to inpatient    Subjective:     History of Present Illness:  Sudden onset of left hearing loss associated with right hand spasticity, mild left ear pain and left facial droop. LKN 2pm today      Past Medical History:   Diagnosis Date    MI (myocardial infarction)     Stroke            Woke up with symptoms?: no    Recent bleeding noted: no  Does the patient take any Blood Thinners? no  Medications: No relevant medications      Past Medical History: hyperlipidemia    Past Surgical History: none    Family History: no relevant history    Social History: no smoking, no drinking, no drugs    Allergies: No Known Allergies     Review of Systems   HENT: Positive for hearing loss.    Neurological: Positive for facial asymmetry.   All other systems reviewed and are negative.    Objective:   Vitals: Blood pressure 134/67, pulse 87, resp. rate 20, height 5' 4" (1.626 m), weight 86.2 kg (190 lb), last menstrual period 10/12/2012, SpO2 98 %.     CT READ: Yes  No hemmorhage. No mass effect. No early infarct signs.     Physical Exam  Vitals reviewed.   Constitutional:       Appearance: Normal appearance.   HENT:      Head: Normocephalic and atraumatic.   Eyes:      Extraocular Movements: Extraocular movements intact.      Pupils: Pupils are equal, round, and reactive to light.   Pulmonary:      Effort: Pulmonary effort is normal.   Neurological:      Mental Status: She is " alert and oriented to person, place, and time.             Recommended the emergency room physician to have a brief discussion with the patient and/or family if available regarding the  risks and benefits of treatment, and to briefly document the occurrence of that discussion in his clinical encounter note.     The attending portion of this evaluation, treatment, and documentation was performed per Jose Armando Luong MD via audiovisual.    Billing code:  ([facility] INPT CONSULT 35MIN)    In your opinion, this was a: Tier 2 Van Negative    Consult End Time: 6:27 PM     Jose Armando Luong MD  [facility]  Vascular Neurology   [facility]

## 2023-01-08 NOTE — SUBJECTIVE & OBJECTIVE
Past Medical History:   Diagnosis Date    MI (myocardial infarction)     Stroke        Past Surgical History:   Procedure Laterality Date     SECTION      CORONARY ARTERY BYPASS GRAFT         Review of patient's allergies indicates:  No Known Allergies    No current facility-administered medications on file prior to encounter.     Current Outpatient Medications on File Prior to Encounter   Medication Sig    ferrous sulfate (FEOSOL) 325 mg (65 mg iron) Tab tablet 1 TABLET BY MOUTH 2 TIMES DAILY. TRY ON EMPTY STOMACH FIRST    losartan (COZAAR) 25 MG tablet Take 1 tablet (25 mg total) by mouth every evening.    albuterol (PROVENTIL/VENTOLIN HFA) 90 mcg/actuation inhaler 2 puffs every 4 hours as needed for cough, wheeze, or shortness of breath    ALPRAZolam (XANAX) 0.5 MG tablet TAKE 1 TABLET BY MOUTH TWICE A DAY    amitriptyline (ELAVIL) 10 MG tablet Take 4 tablets (40 mg total) by mouth every evening.    aspirin 81 MG Chew Take 1 tablet (81 mg total) by mouth once daily.    atorvastatin (LIPITOR) 20 MG tablet TAKE 1 TABLET BY MOUTH EVERY DAY    butalbital-acetaminophen-caffeine -40 mg (FIORICET, ESGIC) -40 mg per tablet Take 1 tablet by mouth every 4 (four) hours as needed for Pain.    cyclobenzaprine (FLEXERIL) 10 MG tablet Take 1 tablet (10 mg total) by mouth 3 (three) times daily as needed for Muscle spasms.    fluticasone propionate (FLONASE) 50 mcg/actuation nasal spray 2 sprays (100 mcg total) by Each Nostril route once daily.    metoprolol tartrate (LOPRESSOR) 50 MG tablet TAKE 1 TABLET BY MOUTH 2 TIMES A DAY    montelukast (SINGULAIR) 10 mg tablet TAKE 1 TABLET BY MOUTH EVERY DAY    multivitamin (THERAGRAN) per tablet Take 1 tablet by mouth once daily.    pantoprazole (PROTONIX) 40 MG tablet TAKE 1 TABLET BY MOUTH EVERY DAY     Family History       Problem Relation (Age of Onset)    Asthma Mother    Breast cancer Maternal Grandmother    Cancer Father    Heart attack Maternal Grandfather           Tobacco Use    Smoking status: Never    Smokeless tobacco: Never   Substance and Sexual Activity    Alcohol use: No     Alcohol/week: 0.0 standard drinks    Drug use: No    Sexual activity: Yes     Partners: Male     Review of Systems   Constitutional:  Negative for activity change, chills, diaphoresis and fever.   HENT:  Negative for congestion, nosebleeds and tinnitus.    Eyes:  Negative for photophobia and visual disturbance.   Respiratory:  Negative for cough, chest tightness, shortness of breath and wheezing.    Cardiovascular:  Negative for chest pain, palpitations and leg swelling.   Gastrointestinal:  Negative for abdominal distention, abdominal pain, constipation, diarrhea, nausea and vomiting.   Endocrine: Negative for cold intolerance and heat intolerance.   Genitourinary:  Negative for difficulty urinating, dysuria, frequency, hematuria and urgency.   Musculoskeletal:  Negative for arthralgias, back pain and myalgias.   Skin:  Negative for pallor, rash and wound.   Allergic/Immunologic: Negative for immunocompromised state.   Neurological:  Positive for facial asymmetry, weakness, light-headedness, numbness and headaches. Negative for dizziness, tremors and speech difficulty.   Hematological:  Negative for adenopathy. Does not bruise/bleed easily.   Psychiatric/Behavioral:  Negative for confusion and sleep disturbance. The patient is not nervous/anxious.    Objective:     Vital Signs (Most Recent):  Temp: 98 °F (36.7 °C) (01/07/23 2233)  Pulse: 72 (01/07/23 2233)  Resp: 18 (01/07/23 2233)  BP: (!) 142/72 (01/07/23 2233)  SpO2: 98 % (01/07/23 2233)   Vital Signs (24h Range):  Temp:  [98 °F (36.7 °C)] 98 °F (36.7 °C)  Pulse:  [61-87] 72  Resp:  [16-20] 18  SpO2:  [97 %-100 %] 98 %  BP: (117-152)/(67-86) 142/72     Weight: 86.2 kg (190 lb)  Body mass index is 32.61 kg/m².    Physical Exam  Vitals and nursing note reviewed.   Constitutional:       General: She is not in acute distress.      Appearance: She is well-developed. She is not ill-appearing, toxic-appearing or diaphoretic.   HENT:      Head: Normocephalic.      Mouth/Throat:      Mouth: Mucous membranes are moist.      Pharynx: Oropharynx is clear.   Eyes:      General: No scleral icterus.     Conjunctiva/sclera: Conjunctivae normal.      Pupils: Pupils are equal, round, and reactive to light.   Neck:      Vascular: No JVD.   Cardiovascular:      Rate and Rhythm: Normal rate and regular rhythm.      Heart sounds: Normal heart sounds. No murmur heard.    No friction rub. No gallop.   Pulmonary:      Effort: Pulmonary effort is normal. No respiratory distress.      Breath sounds: Normal breath sounds. No wheezing or rales.   Abdominal:      General: Bowel sounds are normal. There is no distension.      Palpations: Abdomen is soft.      Tenderness: There is no abdominal tenderness. There is no guarding or rebound.   Musculoskeletal:         General: No tenderness. Normal range of motion.      Cervical back: Normal range of motion and neck supple.   Lymphadenopathy:      Cervical: No cervical adenopathy.   Skin:     General: Skin is warm and dry.      Capillary Refill: Capillary refill takes less than 2 seconds.      Coloration: Skin is not pale.      Findings: No erythema or rash.   Neurological:      Mental Status: She is alert and oriented to person, place, and time.      Cranial Nerves: No cranial nerve deficit.      Sensory: No sensory deficit.      Coordination: Coordination normal.      Deep Tendon Reflexes: Reflexes normal.   Psychiatric:         Behavior: Behavior normal.         Thought Content: Thought content normal.         Judgment: Judgment normal.         CRANIAL NERVES     CN III, IV, VI   Pupils are equal, round, and reactive to light.     Significant Labs: All pertinent labs within the past 24 hours have been reviewed.  CBC:   Recent Labs   Lab 01/07/23  1814   WBC 6.36   HGB 11.4*   HCT 34.0*        CMP:   Recent Labs    Lab 01/07/23  1814   *   K 3.5      CO2 22*   *   BUN 8   CREATININE 0.9   CALCIUM 9.0   PROT 6.7   ALBUMIN 3.5   BILITOT 0.4   ALKPHOS 95   AST 23   ALT 19   ANIONGAP 10       Significant Imaging: I have reviewed all pertinent imaging results/findings within the past 24 hours.

## 2023-01-09 ENCOUNTER — TELEPHONE (OUTPATIENT)
Dept: INTERNAL MEDICINE | Facility: CLINIC | Age: 58
End: 2023-01-09
Payer: COMMERCIAL

## 2023-01-09 ENCOUNTER — PATIENT MESSAGE (OUTPATIENT)
Dept: INTERNAL MEDICINE | Facility: CLINIC | Age: 58
End: 2023-01-09
Payer: COMMERCIAL

## 2023-01-09 VITALS
BODY MASS INDEX: 31.58 KG/M2 | OXYGEN SATURATION: 99 % | WEIGHT: 185 LBS | SYSTOLIC BLOOD PRESSURE: 117 MMHG | DIASTOLIC BLOOD PRESSURE: 75 MMHG | RESPIRATION RATE: 16 BRPM | TEMPERATURE: 97 F | HEIGHT: 64 IN | HEART RATE: 67 BPM

## 2023-01-09 DIAGNOSIS — G43.009 MIGRAINE WITHOUT AURA AND WITHOUT STATUS MIGRAINOSUS, NOT INTRACTABLE: ICD-10-CM

## 2023-01-09 LAB
ALBUMIN SERPL BCP-MCNC: 3.6 G/DL (ref 3.5–5.2)
ALP SERPL-CCNC: 98 U/L (ref 55–135)
ALT SERPL W/O P-5'-P-CCNC: 19 U/L (ref 10–44)
ANION GAP SERPL CALC-SCNC: 9 MMOL/L (ref 8–16)
AORTIC ROOT ANNULUS: 2.97 CM
AORTIC VALVE CUSP SEPERATION: 2.14 CM
AST SERPL-CCNC: 24 U/L (ref 10–40)
AV INDEX (PROSTH): 0.9
AV MEAN GRADIENT: 3 MMHG
AV PEAK GRADIENT: 6 MMHG
AV VALVE AREA: 2.83 CM2
AV VELOCITY RATIO: 0.79
BASOPHILS # BLD AUTO: 0.04 K/UL (ref 0–0.2)
BASOPHILS NFR BLD: 0.9 % (ref 0–1.9)
BILIRUB SERPL-MCNC: 0.5 MG/DL (ref 0.1–1)
BSA FOR ECHO PROCEDURE: 1.95 M2
BUN SERPL-MCNC: 7 MG/DL (ref 6–20)
CALCIUM SERPL-MCNC: 9.4 MG/DL (ref 8.7–10.5)
CHLORIDE SERPL-SCNC: 105 MMOL/L (ref 95–110)
CO2 SERPL-SCNC: 26 MMOL/L (ref 23–29)
CREAT SERPL-MCNC: 0.8 MG/DL (ref 0.5–1.4)
CV ECHO LV RWT: 0.35 CM
DIFFERENTIAL METHOD: ABNORMAL
DOP CALC AO PEAK VEL: 1.26 M/S
DOP CALC AO VTI: 25.2 CM
DOP CALC LVOT AREA: 3.1 CM2
DOP CALC LVOT DIAMETER: 2 CM
DOP CALC LVOT PEAK VEL: 0.99 M/S
DOP CALC LVOT STROKE VOLUME: 71.28 CM3
DOP CALC MV VTI: 24.7 CM
DOP CALCLVOT PEAK VEL VTI: 22.7 CM
E WAVE DECELERATION TIME: 201.84 MSEC
E/A RATIO: 1.7
E/E' RATIO: 10.67 M/S
ECHO LV POSTERIOR WALL: 0.94 CM (ref 0.6–1.1)
EJECTION FRACTION: 48 %
EOSINOPHIL # BLD AUTO: 0.6 K/UL (ref 0–0.5)
EOSINOPHIL NFR BLD: 12.1 % (ref 0–8)
ERYTHROCYTE [DISTWIDTH] IN BLOOD BY AUTOMATED COUNT: 12.4 % (ref 11.5–14.5)
EST. GFR  (NO RACE VARIABLE): >60 ML/MIN/1.73 M^2
FRACTIONAL SHORTENING: 28 % (ref 28–44)
GLUCOSE SERPL-MCNC: 87 MG/DL (ref 70–110)
HCT VFR BLD AUTO: 36.1 % (ref 37–48.5)
HGB BLD-MCNC: 11.7 G/DL (ref 12–16)
IMM GRANULOCYTES # BLD AUTO: 0.01 K/UL (ref 0–0.04)
IMM GRANULOCYTES NFR BLD AUTO: 0.2 % (ref 0–0.5)
INTERVENTRICULAR SEPTUM: 0.96 CM (ref 0.6–1.1)
LA MAJOR: 4.8 CM
LEFT ATRIUM SIZE: 3.87 CM
LEFT INTERNAL DIMENSION IN SYSTOLE: 3.92 CM (ref 2.1–4)
LEFT VENTRICLE DIASTOLIC VOLUME INDEX: 76.21 ML/M2
LEFT VENTRICLE DIASTOLIC VOLUME: 144.03 ML
LEFT VENTRICLE MASS INDEX: 104 G/M2
LEFT VENTRICLE SYSTOLIC VOLUME INDEX: 35.2 ML/M2
LEFT VENTRICLE SYSTOLIC VOLUME: 66.55 ML
LEFT VENTRICULAR INTERNAL DIMENSION IN DIASTOLE: 5.44 CM (ref 3.5–6)
LEFT VENTRICULAR MASS: 195.67 G
LV LATERAL E/E' RATIO: 8.89 M/S
LV SEPTAL E/E' RATIO: 13.33 M/S
LVOT MG: 2.25 MMHG
LVOT MV: 0.71 CM/S
LYMPHOCYTES # BLD AUTO: 2.2 K/UL (ref 1–4.8)
LYMPHOCYTES NFR BLD: 49.1 % (ref 18–48)
MCH RBC QN AUTO: 30.7 PG (ref 27–31)
MCHC RBC AUTO-ENTMCNC: 32.4 G/DL (ref 32–36)
MCV RBC AUTO: 95 FL (ref 82–98)
MONOCYTES # BLD AUTO: 0.4 K/UL (ref 0.3–1)
MONOCYTES NFR BLD: 9 % (ref 4–15)
MV MEAN GRADIENT: 1 MMHG
MV PEAK A VEL: 0.47 M/S
MV PEAK E VEL: 0.8 M/S
MV PEAK GRADIENT: 2 MMHG
MV STENOSIS PRESSURE HALF TIME: 115.66 MS
MV VALVE AREA BY CONTINUITY EQUATION: 2.89 CM2
MV VALVE AREA P 1/2 METHOD: 1.9 CM2
NEUTROPHILS # BLD AUTO: 1.3 K/UL (ref 1.8–7.7)
NEUTROPHILS NFR BLD: 28.7 % (ref 38–73)
NRBC BLD-RTO: 0 /100 WBC
PISA TR MAX VEL: 2.21 M/S
PLATELET # BLD AUTO: 229 K/UL (ref 150–450)
PMV BLD AUTO: 9.8 FL (ref 9.2–12.9)
POTASSIUM SERPL-SCNC: 4.4 MMOL/L (ref 3.5–5.1)
PROT SERPL-MCNC: 6.6 G/DL (ref 6–8.4)
PV MV: 0.48 M/S
PV PEAK VELOCITY: 0.64 CM/S
RA MAJOR: 4.59 CM
RA PRESSURE: 3 MMHG
RBC # BLD AUTO: 3.81 M/UL (ref 4–5.4)
RIGHT VENTRICULAR END-DIASTOLIC DIMENSION: 3.65 CM
RV TISSUE DOPPLER FREE WALL SYSTOLIC VELOCITY 1 (APICAL 4 CHAMBER VIEW): 0.01 CM/S
SODIUM SERPL-SCNC: 140 MMOL/L (ref 136–145)
TDI LATERAL: 0.09 M/S
TDI SEPTAL: 0.06 M/S
TDI: 0.08 M/S
TR MAX PG: 20 MMHG
TV REST PULMONARY ARTERY PRESSURE: 23 MMHG
WBC # BLD AUTO: 4.56 K/UL (ref 3.9–12.7)

## 2023-01-09 PROCEDURE — 36415 COLL VENOUS BLD VENIPUNCTURE: CPT | Performed by: NURSE PRACTITIONER

## 2023-01-09 PROCEDURE — 97161 PT EVAL LOW COMPLEX 20 MIN: CPT

## 2023-01-09 PROCEDURE — 94760 N-INVAS EAR/PLS OXIMETRY 1: CPT

## 2023-01-09 PROCEDURE — 25000003 PHARM REV CODE 250

## 2023-01-09 PROCEDURE — 94761 N-INVAS EAR/PLS OXIMETRY MLT: CPT

## 2023-01-09 PROCEDURE — 80053 COMPREHEN METABOLIC PANEL: CPT | Performed by: NURSE PRACTITIONER

## 2023-01-09 PROCEDURE — 25000003 PHARM REV CODE 250: Performed by: NURSE PRACTITIONER

## 2023-01-09 PROCEDURE — 92523 SPEECH SOUND LANG COMPREHEN: CPT

## 2023-01-09 PROCEDURE — 92610 EVALUATE SWALLOWING FUNCTION: CPT

## 2023-01-09 PROCEDURE — G0378 HOSPITAL OBSERVATION PER HR: HCPCS

## 2023-01-09 PROCEDURE — 85025 COMPLETE CBC W/AUTO DIFF WBC: CPT | Performed by: NURSE PRACTITIONER

## 2023-01-09 PROCEDURE — 25000003 PHARM REV CODE 250: Performed by: STUDENT IN AN ORGANIZED HEALTH CARE EDUCATION/TRAINING PROGRAM

## 2023-01-09 RX ORDER — BUTALBITAL, ACETAMINOPHEN AND CAFFEINE 50; 325; 40 MG/1; MG/1; MG/1
1 TABLET ORAL EVERY 4 HOURS PRN
Qty: 12 TABLET | Refills: 3 | Status: SHIPPED | OUTPATIENT
Start: 2023-01-09

## 2023-01-09 RX ORDER — IBUPROFEN 600 MG/1
600 TABLET ORAL EVERY 6 HOURS PRN
Status: DISCONTINUED | OUTPATIENT
Start: 2023-01-09 | End: 2023-01-09 | Stop reason: HOSPADM

## 2023-01-09 RX ORDER — BUTALBITAL, ACETAMINOPHEN AND CAFFEINE 50; 325; 40 MG/1; MG/1; MG/1
1 TABLET ORAL ONCE
Status: COMPLETED | OUTPATIENT
Start: 2023-01-09 | End: 2023-01-09

## 2023-01-09 RX ORDER — ATORVASTATIN CALCIUM 20 MG/1
20 TABLET, FILM COATED ORAL DAILY
Qty: 90 TABLET | Refills: 3 | Status: SHIPPED | OUTPATIENT
Start: 2023-01-09 | End: 2024-01-09

## 2023-01-09 RX ORDER — BUTALBITAL, ACETAMINOPHEN AND CAFFEINE 50; 325; 40 MG/1; MG/1; MG/1
1 TABLET ORAL EVERY 4 HOURS PRN
Qty: 12 TABLET | Refills: 3 | Status: CANCELLED | OUTPATIENT
Start: 2023-01-09

## 2023-01-09 RX ORDER — NAPROXEN SODIUM 220 MG/1
81 TABLET, FILM COATED ORAL DAILY
Qty: 90 TABLET | Refills: 3
Start: 2023-01-09 | End: 2024-01-09

## 2023-01-09 RX ORDER — CLOPIDOGREL BISULFATE 75 MG/1
75 TABLET ORAL DAILY
Qty: 21 TABLET | Refills: 0 | Status: SHIPPED | OUTPATIENT
Start: 2023-01-09 | End: 2023-04-10

## 2023-01-09 RX ADMIN — ATORVASTATIN CALCIUM 20 MG: 20 TABLET, FILM COATED ORAL at 09:01

## 2023-01-09 RX ADMIN — MONTELUKAST 10 MG: 10 TABLET, FILM COATED ORAL at 09:01

## 2023-01-09 RX ADMIN — ASPIRIN 81 MG CHEWABLE TABLET 81 MG: 81 TABLET CHEWABLE at 09:01

## 2023-01-09 RX ADMIN — PANTOPRAZOLE SODIUM 40 MG: 40 TABLET, DELAYED RELEASE ORAL at 09:01

## 2023-01-09 RX ADMIN — METOPROLOL TARTRATE 50 MG: 50 TABLET, FILM COATED ORAL at 09:01

## 2023-01-09 RX ADMIN — BUTALBITAL, ACETAMINOPHEN, AND CAFFEINE 1 TABLET: 50; 325; 40 TABLET ORAL at 01:01

## 2023-01-09 RX ADMIN — ALPRAZOLAM 0.5 MG: 0.25 TABLET ORAL at 09:01

## 2023-01-09 RX ADMIN — CLOPIDOGREL BISULFATE 75 MG: 75 TABLET ORAL at 09:01

## 2023-01-09 RX ADMIN — ACETAMINOPHEN 650 MG: 325 TABLET ORAL at 05:01

## 2023-01-09 NOTE — PLAN OF CARE
Problem: Adjustment to Illness (Stroke, Ischemic/Transient Ischemic Attack)  Goal: Optimal Coping  Outcome: Ongoing, Progressing     Problem: Bowel Elimination Impaired (Stroke, Ischemic/Transient Ischemic Attack)  Goal: Effective Bowel Elimination  Outcome: Ongoing, Progressing     Problem: Cerebral Tissue Perfusion (Stroke, Ischemic/Transient Ischemic Attack)  Goal: Optimal Cerebral Tissue Perfusion  Outcome: Ongoing, Progressing     Problem: Cognitive Impairment (Stroke, Ischemic/Transient Ischemic Attack)  Goal: Optimal Cognitive Function  Outcome: Ongoing, Progressing     Problem: Communication Impairment (Stroke, Ischemic/Transient Ischemic Attack)  Goal: Improved Communication Skills  Outcome: Ongoing, Progressing

## 2023-01-09 NOTE — TELEPHONE ENCOUNTER
Spoke to patient, she is at [facility] trying to get her Fiorocet refilled. I explained that recently there Rx now requires JOSE. Message pended to include JOSE in a duplicate encounter.

## 2023-01-09 NOTE — PLAN OF CARE
Problem: Adult Inpatient Plan of Care  Goal: Plan of Care Review  Outcome: Ongoing, Progressing     Problem: Cognitive Impairment (Stroke, Ischemic/Transient Ischemic Attack)  Goal: Optimal Cognitive Function  Outcome: Ongoing, Progressing     Problem: Functional Ability Impaired (Stroke, Ischemic/Transient Ischemic Attack)  Goal: Optimal Functional Ability  Outcome: Ongoing, Progressing     Problem: Sensorimotor Impairment (Stroke, Ischemic/Transient Ischemic Attack)  Goal: Improved Sensorimotor Function  Outcome: Ongoing, Progressing     Problem: Urinary Elimination Impaired (Stroke, Ischemic/Transient Ischemic Attack)  Goal: Effective Urinary Elimination  Outcome: Ongoing, Progressing     Problem: Fall Injury Risk  Goal: Absence of Fall and Fall-Related Injury  Outcome: Ongoing, Progressing

## 2023-01-09 NOTE — HOSPITAL COURSE
Patient was monitored closely throughout course of hospital stay by hospital medicine team. Neurology consulted on admission. Patient underwent MRI imaging showing no acute process. MRI did demonstrate incidental 8 mm right inferior frontal convexity partially calcified presumed meningioma. Echo obtained showing EF 48%, no PFO, mild left atrial enlargement.. Patient's hearing loss resolved after cerumen impaction removal. PT/OT consulted on admission. Started on secondary stroke prevention with ASA 81 mg daily, Plavix 75 mg daily, and atorvastatin 20 mg daily. Neurology cleared for discharge with follow up in [facility] in 3-5 days. Patient admission symptoms resolved over course of stay. Patient to be discharged home with DAPT for 3 weeks followed by ASA alone therapy. Patient verbalized understanding of discharge instructions and return precautions.    Physical Exam:  General- Patient alert and oriented x3 in NAD  HEENT- PERRLA, EOMI, OP clear, MMM  CV- Regular rate and rhythm, No Murmur/aroldo/rubs  Resp- Lungs CTA Bilaterally, No increased WOB  GI- Non tender/non-distended, BS normoactive x4 quads, no HSM  Extrem- No cyanosis, clubbing, edema. Pulses 2+ and symmetric  Neuro- Strength 5/5 flexors/extensors, DTRs 2+ and symmetric, Intact sensation to light touch grossly

## 2023-01-09 NOTE — RESPIRATORY THERAPY
01/08/23 1957   Patient Assessment/Suction   Level of Consciousness (AVPU) alert   Respiratory Effort Normal;Unlabored   Expansion/Accessory Muscles/Retractions no use of accessory muscles   PRE-TX-O2   Device (Oxygen Therapy) room air   SpO2 98 %   Pulse Oximetry Type Intermittent   $ Pulse Oximetry - Multiple Charge Pulse Oximetry - Multiple   Pulse 67   Resp 16

## 2023-01-09 NOTE — TELEPHONE ENCOUNTER
No new care gaps identified.  [facility] Embedded Care Gaps. Reference number: [id number]. 1/09/2023   2:44:07 PM CST

## 2023-01-09 NOTE — PROGRESS NOTES
[facility]  Progress Note  Date: 2023 9:44 AM            Patient Name: Renée Goff   MRN: [id number]   : 1965    AGE: 57 y.o.    LOS: 0 days Hospital Day: 3  Admit date: 2023  5:52 PM         HPI per EMR:  Renée Goff is a 57-year-old female who presents emergency room for evaluation of acute left hearing loss with left facial droop.  Patient reported that while at rest she noticed the sudden onset of loss of hearing from the left ear.  She denied any ear pain.  Her  noticed she had a small amount of left facial drooping at the same time.  Patient also endorse some paresthesias in the left hand at the time of this incident.  Patient denies any other complaints.  She denies any fever or chills.  No known sick contacts or travel.  She is vaccinated for COVID.  Previous medical history includes obesity, prior CVA, hyperlipidemia, anemia, thrombocytopenia, and acute MI.  ER workup:  CBC unremarkable.  CMP with glucose 152 otherwise unremarkable.  CTA of the head and neck were negative for any acute findings.  ER physician spoke with Neurology who recommended MRI with and without contrast in the a.m. with internal auditory canal protocol.  ER physician performed a cerumen impaction removal with resolution of her hearing loss.  Patient admitted to Hospital Medicine for treatment management.  Patient placed on CVA pathway.  Neurology consult in a.m.     Neurology Consult:  Patient was seen and examined by me today.  She is a 57-year-old woman with history of stroke, TIA and hypertension presented to the emergency room with complaints of acute left hearing loss followed by left facial droop, facial numbness and left hand paresthesias.  Upon arrival to the emergency room, she was found to have cerumen impaction in left ear which was removed and patient recovered her hearing completely.  However, since she has a history of stroke and TIA in the past, and presented with acute  "onset neuro deficits, we will obtain MRI, stroke/TIA workup.    01/09/2023: No acute events overnight. Patient was seen and examined by me this morning. Neuro exam is normal this morning.  She states that her hearing loss, left-sided paresthesias, facial numbness and drooping has improved.       Vitals:  Patient Vitals for the past 24 hrs:   BP Temp Temp src Pulse Resp SpO2 Height Weight   01/09/23 0800 (!) 108/58 97.5 °F (36.4 °C) Oral 60 16 99 % 5' 4" (1.626 m) 83.9 kg (185 lb)   01/09/23 0405 (!) 95/51 97.3 °F (36.3 °C) Oral (!) 50 16 98 % -- --   01/08/23 2328 (!) 90/50 97.7 °F (36.5 °C) Oral 62 16 96 % -- --   01/08/23 1957 -- -- -- 67 16 98 % -- --   01/08/23 1956 114/66 97.9 °F (36.6 °C) Oral 68 16 98 % -- --   01/08/23 1626 (!) 96/53 96.3 °F (35.7 °C) -- (!) 55 16 99 % -- --   01/08/23 1600 (!) 96/53 96.3 °F (35.7 °C) Oral (!) 55 (!) 6 99 % -- --   01/08/23 1200 (!) 115/58 (!) 95 °F (35 °C) Oral (!) 50 16 95 % -- --     PHYSICAL EXAM:     GENERAL APPEARANCE: Well-developed, well-nourished female in no acute distress.  HEENT: Normocephalic and atraumatic. PERRL. Oropharynx unremarkable.  PULM: Comfortable on room air.  CV: RRR.  ABDOMEN: Soft, nontender.  EXTREMITIES: No signs of vascular compromise. Pulses present. No cyanosis, clubbing or edema.  SKIN: Clear; no rashes, lesions or skin breaks in exposed areas.      NEURO:   MENTAL STATUS: Patient awake and oriented to time, place, and person. Affect normal.  CRANIAL NERVES II-XII: Pupils equal, round and reactive to light. Extraocular movements full and intact. No facial asymmetry.  MOTOR: Normal bulk. Tone normal and symmetrical throughout.  No abnormal movements. No tremor.   Strength 5/5 throughout unless specified below.  REFLEXES: DTRs 2+; normal and symmetric throughout.   SENSATION: Sensation grossly intact to fine touch.  COORDINATION: Finger-to-nose normal for age and symmetric.  STATION: Romberg deferred.  GAIT: Deferred.    CURRENT SCHEDULED " MEDICATIONS:   ALPRAZolam  0.5 mg Oral BID    amitriptyline  40 mg Oral QHS    aspirin  81 mg Oral Daily    atorvastatin  20 mg Oral Daily    clopidogreL  75 mg Oral Daily    enoxaparin  40 mg Subcutaneous Daily    metoprolol tartrate  50 mg Oral BID    montelukast  10 mg Oral Daily    pantoprazole  40 mg Oral Daily     CURRENT INFUSIONS:   sodium chloride 0.9%       DATA:  Recent Labs   Lab 01/07/23  1814 01/08/23 0443 01/09/23  0529   * 140 140   K 3.5 3.9 4.4    105 105   CO2 22* 27 26   BUN 8 7 7   CREATININE 0.9 0.8 0.8   * 91 87   CALCIUM 9.0 9.4 9.4   PHOS  --  3.8  --    MG  --  2.0  --    AST 23 23 24   ALT 19 19 19     Recent Labs   Lab 01/07/23 1814 01/08/23 0443 01/09/23  0530   WBC 6.36 4.80 4.56   HGB 11.4* 11.4* 11.7*   HCT 34.0* 35.2* 36.1*    217 229     No results found for: PROTEINCSF, GLUCCSF, WBCCSF, RBCCSF, PMNCSF  Hemoglobin A1C   Date Value Ref Range Status   01/07/2023 5.4 4.0 - 5.6 % Final     Comment:     ADA Screening Guidelines:  5.7-6.4%  Consistent with prediabetes  >or=6.5%  Consistent with diabetes    High levels of fetal hemoglobin interfere with the HbA1C  assay. Heterozygous hemoglobin variants (HbS, HgC, etc)do  not significantly interfere with this assay.   However, presence of multiple variants may affect accuracy.     10/24/2022 5.6 4.0 - 5.6 % Final     Comment:     ADA Screening Guidelines:  5.7-6.4%  Consistent with prediabetes  >or=6.5%  Consistent with diabetes    High levels of fetal hemoglobin interfere with the HbA1C  assay. Heterozygous hemoglobin variants (HbS, HgC, etc)do  not significantly interfere with this assay.   However, presence of multiple variants may affect accuracy.     07/14/2020 5.7 (H) 4.0 - 5.6 % Final     Comment:     ADA Screening Guidelines:  5.7-6.4%  Consistent with prediabetes  >or=6.5%  Consistent with diabetes  High levels of fetal hemoglobin interfere with the HbA1C  assay. Heterozygous hemoglobin variants (HbS,  HgC, etc)do  not significantly interfere with this assay.   However, presence of multiple variants may affect accuracy.              I have personally reviewed and interpreted the pertinent imaging and lab results.  Imaging Results              CTA Head and Neck (xpd) (Final result)  Result time 01/08/23 09:22:09      Final result by Michael Oseguera MD (01/08/23 09:22:09)                   Impression:      1. Normal CT angiogram of the extracranial carotid vasculature and yofhwd-kl-Heknuk vasculature.  No hemodynamically significant stenosis or vessel occlusion involving the internal carotid arteries or vlzwnp-rh-Ltxkmr vasculature.  No intracranial aneurysm formation appreciated.  Both vertebral arteries are also widely patent.  2. Sinus disease  3. Rightward bony nasal septal deviation  4. Other findings as detailed above      Electronically signed by: Mark Oseguera MD  Date:    01/08/2023  Time:    09:22               Narrative:    EXAMINATION:  CTA HEAD AND NECK (XPD)    CLINICAL HISTORY:  Neuro deficit, acute, stroke suspected;    TECHNIQUE:  Following the intravenous administration of 75cc of Omnipaque 350 contrast material, 1.25-mm contiguous axial images were acquired through the vasculature of the neck and [place] utilizing the CTA protocol.  Subsequently, coronal and sagittal reconstructed images were generated from the source data.  Curved reformatted images were also acquired through the carotid arteries and great vessel origins .  2-D and 3-D MIP reconstructed images of the [place]  and 3-D volume rendered images of the [place] vasculature were also generated from the source data.    COMPARISON:  None.    FINDINGS:  CTA neck:    There is a 3 vessel arch present.  The left and right subclavian artery and innominate artery are widely patent without hemodynamically significant stenosis.  There is mild tortuosity of the proximal left common carotid artery with mild kinking  noted on series 5, image 102.  No hemodynamically significant stenosis or occlusion involving the left or right common carotid artery.  The left and right internal carotid artery are widely patent throughout with no hemodynamically significant stenosis or vessel occlusion appreciated.  No internal carotid artery dissection.  There is a dominant left vertebral artery.  The vertebral arteries are widely patent throughout with no evidence of hemodynamically significant stenosis or vessel occlusion.    CTA Head:    The petrous, cavernous, and supraclinoid segments of the internal carotid arteries are widely patent.  The basilar artery is widely patent without hemodynamically significant stenosis or occlusion.  The left and right anterior cerebral, middle cerebral, and posterior cerebral arteries are widely patent without hemodynamically significant stenosis or occlusion.  No cerebral aneurysm formation appreciated.  There is a patent right posterior communicating artery.  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    Soft tissues Neck:    The nasopharynx, oropharynx, hypopharynx, base of the tongue, epiglottis, larynx, true vocal cords, subglottic trachea, and thyroid gland show no significant abnormalities.  There is fatty replacement of the parotid glands and submandibular glands bilaterally.  No indication of sialolithiasis or sialoadenitis.  No concerning salivary gland mass appreciated.  No pathologically enlarged lymph nodes in the neck or supraclavicular regions.  The visualized superior mediastinum is remarkable for postoperative change of prior median sternotomy and CABG.  There is a mosaic lung pattern at the lung apices which could relate to small airways disease or pulmonary edema.    Brain:    The brain is normally formed with preserved gray-white matter junction differentiation.  No imaging evidence of acute/recent major vascular territory cerebral infarction, parenchymal  hemorrhage, or intra-axial mass.  No enhancing vascular malformation.  No hydrocephalus.  No effacement of the skull base cisterns.  No extra-axial fluid collections or blood products.    There is complete opacification of the right maxillary sinus with inspissated secretions observed in the right maxillary sinus.  There is bony thickening involving the right maxillary sinus suggesting a component of chronic sinusitis.  There is prominent mucoperiosteal thickening/fluid present within the ethmoid air cells bilaterally.  There are aerated secretions present within the left aspect of the sphenoid sinus.  There is rightward bony nasal septal deviation.  The mastoid air cells are unremarkable.  There is cerumen present in the left external auditory canal.  The orbits show no significant abnormalities.    Bones:    There is reversal of the normal cervical lordosis, possibly due to positioning.  Neck muscle spasm is not excluded.  There is degenerative spondylosis of the cervical spine in the form of marginal osteophyte formation, disc space narrowing, and uncovertebral spurring, most pronounced at C6-C7.                                              ASSESSMENT AND PLAN:      TIA  57-year-old woman with history of stroke, TIA and hypertension presented to the emergency room with complaints of acute left hearing loss followed by left facial droop, facial numbness and left hand paresthesias. Hearing loss resolved after cerumen impaction was treated, but patient does have risk factors for TIA and stroke, so workup was ordered. NIHSS is 0. Her symptoms are improved and she feels she is back to baseline     Stroke workup:  CTH: no acute intracranial abnormality  CTA head and neck: no LVO or critical stenosis  MRI brain: negaitve for acute abnormality.  Incidentally found meningioma in the right inferior frontal convexity.  Risk factors: A1C, TSH, LDL  Echocardiogram:  EF 48%, no PFO, mild left atrial enlargement.     Plan:  -  Admitted to hospital medicine with q4 hour neuro checks, on telemetry, continuous pulse oximetry  - Secondary stroke prevention with ASA 81 mg daily, plavix 75 mg daily, atorvastatin 20 mg daily. Continue DAPT with both ASA and plavix for 3 weeks, then stop plavix and continue ASA monotherapy and statin  - Risk factor stratification with HgbA1C, Fasting Lipid profile, TSH  - 2D echocardiogram as above  - Maintain Euthermia with Tylenol prn temp > 37.2 degrees C.  - Assessment for rehab with PT/OT/SLP evaluation and treatment.  - BP Goal <180/105  -outpatient follow-up with Neurosurgery for meningioma  -No further neurological workup needed at this time. Will follow as needed. Please call with any questions.         38 minutes of care time has been spent evaluating with the patient. Time includes chart review not limited to diagnostic imaging, labs, and vitals, patient assessment, discussion with family and nursing, current order evaluations, and new order entries.      Ricardo Whittaker MD  Neurology/vascular Neurology  Date of Service: 01/09/2023  9:44 AM    Please note: This note was transcribed using voice recognition software. Because of this technology there are often uinintended grammatical, spelling, and other transcription errors. Please disregard these errors.

## 2023-01-09 NOTE — DISCHARGE SUMMARY
[facility] Hospital Medicine  Discharge Summary      Patient Name: Renée Goff  MRN: [id number]  BETZY: [id number]  Patient Class: OP- Observation  Admission Date: 1/7/2023  Hospital Length of Stay: 0 days  Discharge Date and Time: 1/9/2023  2:07 PM  Attending Physician: Imani Arnold M.D.    Discharging Provider: Matthew Salcedo PA-C  Primary Care Provider: Thomas Khan MD    Primary Care Team: Networked reference to record PCT     HPI:   Renée Goff is a 57-year-old female who presents emergency room for evaluation of acute left hearing loss with left facial droop.  Patient reported that while at rest she noticed the sudden onset of loss of hearing from the left ear.  She denied any ear pain.  Her  noticed she had a small amount of left facial drooping at the same time.  Patient also endorse some paresthesias in the left hand at the time of this incident.  Patient denies any other complaints.  She denies any fever or chills.  No known sick contacts or travel.  She is vaccinated for COVID.  Previous medical history includes obesity, prior CVA, hyperlipidemia, anemia, thrombocytopenia, and acute MI.  ER workup:  CBC unremarkable.  CMP with glucose 152 otherwise unremarkable.  CTA of the head and neck were negative for any acute findings.  ER physician spoke with Neurology who recommended MRI with and without contrast in the a.m. with internal auditory canal protocol.  ER physician performed a cerumen impaction removal with resolution of her hearing loss.  Patient admitted to Hospital Medicine for treatment management.  Patient placed on CVA pathway.  Neurology consult in a.m..      * No surgery found *      Hospital Course:   Patient was monitored closely throughout course of hospital stay by hospital medicine team. Neurology consulted on admission. Patient underwent MRI imaging showing no acute process. MRI did demonstrate incidental 8 mm right inferior frontal convexity  partially calcified presumed meningioma. Echo obtained showing EF 48%, no PFO, mild left atrial enlargement.. Patient's hearing loss resolved after cerumen impaction removal. PT/OT consulted on admission. Started on secondary stroke prevention with ASA 81 mg daily, Plavix 75 mg daily, and atorvastatin 20 mg daily. Neurology cleared for discharge with follow up in Neurocare in 3-5 days. Patient admission symptoms resolved over course of stay. Patient to be discharged home with DAPT for 3 weeks followed by ASA alone therapy. Patient verbalized understanding of discharge instructions and return precautions.    Physical Exam:  General- Patient alert and oriented x3 in NAD  HEENT- PERRLA, EOMI, OP clear, MMM  CV- Regular rate and rhythm, No Murmur/aroldo/rubs  Resp- Lungs CTA Bilaterally, No increased WOB  GI- Non tender/non-distended, BS normoactive x4 quads, no HSM  Extrem- No cyanosis, clubbing, edema. Pulses 2+ and symmetric  Neuro- Strength 5/5 flexors/extensors, DTRs 2+ and symmetric, Intact sensation to light touch grossly         Goals of Care Treatment Preferences:  Code Status: Full Code    Living Will: Yes              Consults:   Consults (From admission, onward)        Status Ordering Provider     Inpatient consult to Neurology  Once        Provider:  Cyndee Marks MD    Completed LAUREL GALLO     IP consult to case management/social work  Once        Provider:  (Not yet assigned)    LAUREL Woodward          No new Assessment & Plan notes have been filed under this hospital service since the last note was generated.  Service: Hospital Medicine    Final Active Diagnoses:    Diagnosis Date Noted POA    PRINCIPAL PROBLEM:  CVA (cerebral vascular accident) [I63.9] 11/13/2012 Yes    Hearing loss of left ear [H91.92] 01/07/2023 Yes    Thrombocytopenia [D69.6] 05/22/2019 Yes    Hypercholesterolemia, baseline .6 [E78.00] 11/09/2017 Yes      Problems Resolved During this Admission:        Discharged Condition: good    Disposition: Home or Self Care    Follow Up:   Follow-up Information     [facility]    Specialty: Physical Therapy  Why: hospital follow up  Contact information:  [address] [place] [address] [phone]             Thomas Khan MD. Go to.    Specialty: Family Medicine  Why: APPOINTMENT:  January 19, 2023 at 9:30am  hospital follow up  Contact information:  [address]  [address]  [facility] [address]  [phone]                       Patient Instructions:      Diet Cardiac   Order Comments: See Stroke Patient Education Guide Booklet for details.     Activity as tolerated     Call MD for:  temperature >100.4     Call MD for:  persistent nausea and vomiting or diarrhea     Call MD for:  severe uncontrolled pain     Call MD for:  difficulty breathing or increased cough     Call MD for:  persistent dizziness, light-headedness, or visual disturbances     Call MD for:  increased confusion or weakness       Significant Diagnostic Studies: Labs:   CMP   Recent Labs   Lab 01/07/23  1814 01/08/23  0443 01/09/23  0529   * 140 140   K 3.5 3.9 4.4    105 105   CO2 22* 27 26   * 91 87   BUN 8 7 7   CREATININE 0.9 0.8 0.8   CALCIUM 9.0 9.4 9.4   PROT 6.7 6.6 6.6   ALBUMIN 3.5 3.5 3.6   BILITOT 0.4 0.6 0.5   ALKPHOS 95 97 98   AST 23 23 24   ALT 19 19 19   ANIONGAP 10 8 9    and CBC   Recent Labs   Lab 01/07/23  1814 01/08/23  0443 01/09/23  0530   WBC 6.36 4.80 4.56   HGB 11.4* 11.4* 11.7*   HCT 34.0* 35.2* 36.1*    217 229       Pending Diagnostic Studies:     None         Medications:  Reconciled Home Medications:      Medication List      START taking these medications    clopidogreL 75 mg tablet  Commonly known as: PLAVIX  Take 1 tablet (75 mg total) by mouth once daily. for 21 days        CHANGE how you take these medications    atorvastatin 20 MG tablet  Commonly known as: LIPITOR  Take 1 tablet (20 mg  total) by mouth once daily.  What changed: when to take this        CONTINUE taking these medications    albuterol 90 mcg/actuation inhaler  Commonly known as: PROVENTIL/VENTOLIN HFA  2 puffs every 4 hours as needed for cough, wheeze, or shortness of breath     ALPRAZolam 0.5 MG tablet  Commonly known as: XANAX  TAKE 1 TABLET BY MOUTH TWICE A DAY     amitriptyline 10 MG tablet  Commonly known as: ELAVIL  Take 4 tablets (40 mg total) by mouth every evening.     aspirin 81 MG Chew  Take 1 tablet (81 mg total) by mouth once daily.     cyclobenzaprine 10 MG tablet  Commonly known as: FLEXERIL  Take 1 tablet (10 mg total) by mouth 3 (three) times daily as needed for Muscle spasms.     ferrous sulfate 325 mg (65 mg iron) Tab tablet  Commonly known as: FEOSOL  1 TABLET BY MOUTH 2 TIMES DAILY. TRY ON EMPTY STOMACH FIRST     fluticasone propionate 50 mcg/actuation nasal spray  Commonly known as: FLONASE  2 sprays (100 mcg total) by Each Nostril route once daily.     losartan 25 MG tablet  Commonly known as: COZAAR  Take 1 tablet (25 mg total) by mouth every evening.     metoprolol tartrate 50 MG tablet  Commonly known as: LOPRESSOR  TAKE 1 TABLET BY MOUTH 2 TIMES A DAY     montelukast 10 mg tablet  Commonly known as: SINGULAIR  TAKE 1 TABLET BY MOUTH EVERY DAY     multivitamin per tablet  Commonly known as: THERAGRAN  Take 1 tablet by mouth once daily.     pantoprazole 40 MG tablet  Commonly known as: PROTONIX  TAKE 1 TABLET BY MOUTH EVERY DAY            Indwelling Lines/Drains at time of discharge:   Lines/Drains/Airways     None                 Time spent on the discharge of patient: 38 minutes         Matthew Salcedo PA-C  Department of Hospital Medicine  [facility]

## 2023-01-09 NOTE — PT/OT/SLP EVAL
"Speech Language Pathology Evaluation  Cognitive/Bedside Swallow    Patient Name:  Renée Goff   MRN:  [id number]  Admitting Diagnosis: CVA (cerebral vascular accident)    Recommendations:                  General Recommendations:  Follow-up not indicated  Diet recommendations:  Regular, Thin   Aspiration Precautions: Standard aspiration precautions   General Precautions: Standard, fall  Communication strategies:  none    History:     Past Medical History:   Diagnosis Date    MI (myocardial infarction)     Stroke        Past Surgical History:   Procedure Laterality Date     SECTION      CORONARY ARTERY BYPASS GRAFT         Social History: Patient lives with .    Prior Intubation HX:  none this admit    Modified Barium Swallow: none in Epic    Imaging:  MRI IAC/Temporal Bones W W/O Contrast   Final Result      1. Negative contrast-enhanced imaging of the IAC/CP angles and brain parenchyma.  No acute process.   2. Incidental 8 mm right inferior frontal convexity partially calcified presumed meningioma.  No mass effect.         Electronically signed by: Bennie Williamson   Date:    2023   Time:    08:47      CTA Head and Neck (xpd)   Final Result      1. Normal CT angiogram of the extracranial carotid vasculature and xdeheq-gp-Xjfzva vasculature.  No hemodynamically significant stenosis or vessel occlusion involving the internal carotid arteries or hxwdri-wk-Gxmcfa vasculature.  No intracranial aneurysm formation appreciated.  Both vertebral arteries are also widely patent.   2. Sinus disease   3. Rightward bony nasal septal deviation   4. Other findings as detailed above         Electronically signed by: Mark Oseguera MD   Date:    2023   Time:    09:22           Prior diet: Regular/thin.    Occupation/hobbies/homemaking: Independent with ADLs/IADLs. +driving. Employed FT.    Subjective     "I'll still have trough when I get nervous or upset. I start to stutter." (Chronic; since )  "Certain " "words I can't say." (Husbands name, chicken, etc)     Pain/Comfort:  Pain Rating 1: 0/10    Respiratory Status: Room air    Objective:   Pt seen for clinical swallow and cognitive communication evaluations. She is AAOx4, very pleasant and cooperative. Able to provide reliable history. Chronic articulation d/o per her report. Unable to say certain words; appears to leave out middle consonant (?). Also reports occasional stuttering when nervous. She reports speech, language and cognition currently at baseline.     Cognitive Status:    Arousal/Alertness Appropriate response to stimuli  Attention Alternating attention deficit  --mild difficulty with serial subtractions and Sustained attention deficit --WFL  Perseveration Not present  Orientation Oriented x4  Memory Immediate Recall --WFL, Delayed--recalled 3/5 unrelated words following 5 minute filled delay; 5/5 with cues, and recall general information --[facility]  [employer] --[facility]  Simple calculation --[facility]    Mild delays in processing; chronic per pt report     Receptive Language:   Comprehension:      [facility]    Pragmatics:    [facility]    Expressive Language:  Verbal:    [facility]      Motor Speech:  100% intelligible at conversational level. Pt demonstrates inability to state husbands name, "Keegan." Middle consonant deletion noted. Articulatory imprecision noted with word "chicken." Pt aware of words that give her the most difficulty ; chronic since 1996    Voice:   [facility]    Visual-Spatial:  [facility]      Oral Musculature Evaluation  Oral Musculature: WFL  Dentition: scattered dentition  Secretion Management: adequate  Mucosal Quality: good  Mandibular Strength and Mobility: WFL  Oral Labial Strength and Mobility: WFL  Lingual Strength and Mobility: [facility]  Velar Elevation: [facility]  Buccal Strength and Mobility: [facility]  Volitional Cough: adequate  Volitional Swallow: val to palpate laryngeal rise  Voice Prior to PO Intake: clear    Bedside Swallow Eval:   Consistencies Assessed:  Thin " liquids --via cup and straw  Puree --applesauce  Mixed consistencies --diced peaches  Solids --oinel cracker      Oral Phase:   WFL    Pharyngeal Phase:   no overt clinical signs/symptoms of aspiration  no overt clinical signs/symptoms of pharyngeal dysphagia    Compensatory Strategies  None    Treatment: MoCA (Version 8.1) administered with pt achieving a score of 24/30 suggesting possible mild cognitive-linguistic impairment. Pt earned 4 of 5 points on visuospatial/executive functions, 2 of 3 points on naming, 5 of 6 points for attention, 2 of 3 points for language, 1 of 2 points for abstraction, 3 of 5 points for delayed recall and 6 of 6 points for orientation.      No significant decline when compared to MoCA score 1/16/2017; 25/30.    Assessment:   Clinical swallow and cognitive communication evaluations completed. She presents with oropharyngeal swallow WFL. No acute changes in speech, language or cognition. Skilled ST not indicated at this tie. Please reconsult if change in neuro status should occur.       Plan:     Patient to be seen:      Plan of Care expires:     Plan of Care reviewed with:  patient   SLP Follow-Up:  No       Discharge recommendations:  Discharge Facility/Level of Care Needs: home   Barriers to Discharge:  None    Time Tracking:     SLP Treatment Date:   01/09/23  Speech Start Time:  0956  Speech Stop Time:  1018     Speech Total Time (min):  22 min    Billable Minutes: Eval 14 , Eval Swallow and Oral Function 8, and Total Time 22    01/09/2023

## 2023-01-09 NOTE — TELEPHONE ENCOUNTER
No new care gaps identified.  [facility] Embedded Care Gaps. Reference number: [id number]. 1/09/2023   1:57:08 PM CST

## 2023-01-09 NOTE — TELEPHONE ENCOUNTER
Called [facility] and spoke to pharmacist to verify they've rec'd updated Rx with JOSE # and they have.    Messaged pt to update.

## 2023-01-09 NOTE — TELEPHONE ENCOUNTER
----- Message from Finesse Da sent at 1/9/2023  2:35 PM CST -----  Regarding: self [phone]  Type: Patient Call Back    Who called: Self    What is the request in detail: Pt. Wants a call back for some information about a medicine    Can the clinic reply by [facility]? no    Would the patient rather a call back or a response via [facility]? Call back     Best call back number: [phone]    Additional Information:    Thank you.

## 2023-01-09 NOTE — TELEPHONE ENCOUNTER
"----- Message from Clotilde Harley sent at 1/9/2023  2:43 PM CST -----  Regarding: [facility]" [phone]  .Type: Patient Call Back    Who called: Aye "GEOFFREY" [phone]    What is the request in detail: Pharmacy is not receiving request for butalbital-acetaminophen-caffeine -40 mg (FIORICET, ESGIC) -40 mg per tablets die to it having to be sent over as a control substance. Please resend correctly or call it in bc it might ne an [employer] system error   [id number] - [place], [place] - [address]  [address]  [place] [address]  Phone: [phone] Fax: [phone]    Can the clinic reply by [facility]? Call back    Would the patient rather a call back or a response via [facility]? Call back    Best call back number [phone]       "

## 2023-01-09 NOTE — TELEPHONE ENCOUNTER
----- Message from Leslie Rm sent at 1/9/2023 10:28 AM CST -----  Regarding: [id number]  Type: RX Refill Request    Who Called: self    Have you contacted your pharmacy:yes    Refill or New Rx:refill    RX Name and Strength:butalbital-acetaminophen-caffeine -40 mg (FIORICET, ESGIC) -40 mg per tablet      Preferred Pharmacy with phone number:  [id number] - [place] - [address]  [address] [place] [address]  Phone: [phone] Fax: [phone]          Local or Mail Order:local    Ordering Provider:Bill    Would the patient rather a call back or a response via [facility]?call back     Best Call Back Number:[phone]    Additional Information: pt states she is currently out of medication.

## 2023-01-09 NOTE — PT/OT/SLP EVAL
Physical Therapy Evaluation and Discharge Note    Patient Name:  Renée Goff   MRN:  [id number]    Recommendations:     Discharge Recommendations: home  Discharge Equipment Recommendations: none   Barriers to discharge: None    Assessment:     Renée Goff is a 57 y.o. female admitted with a medical diagnosis of CVA (cerebral vascular accident). .  At this time, patient is functioning at their prior level of function and does not require further acute PT services. Patient agreeable to PT eval this morning. Patient presented supine in bed. Patient was independent with bed mobility, gait, and stairs with no AD. Patient does not require inpatient PT at this time.     Recent Surgery: * No surgery found *      Plan:     During this hospitalization, patient does not require further acute PT services.  Please re-consult if situation changes.      Subjective     Chief Complaint: headache   Patient/Family Comments/goals: Go home  Pain/Comfort:  Pain Rating 1: 7/10  Location - Side 1: Left  Location 1: head  Pain Addressed 1: Reposition, Nurse notified, Cessation of Activity  Pain Rating Post-Intervention 1: 7/10    Patients cultural, spiritual, Anabaptism conflicts given the current situation:      Living Environment:  Currently, patient lives with  in 2nd floor of a condo. Patient states there is access to elevator and ~16 steps.  Prior to admission, patients level of function was independent.  Equipment used at home: none.  DME owned (not currently used): none.  Upon discharge, patient will have assistance from family.    Objective:     Communicated with nurse prior to session.  Patient found supine with bed alarm upon PT entry to room.    General Precautions: Standard, fall    Orthopedic Precautions:N/A   Braces: N/A  Respiratory Status: Room air    Exams:  Sensation:    -       Intact  RLE ROM: WFL  RLE Strength: WFL  LLE ROM: WFL  LLE Strength: WFL    Functional Mobility:  Bed Mobility:     Supine to Sit:  stand by assistance  Sit to Supine: stand by assistance  Transfers:     Sit to Stand:  stand by assistance with no AD  Gait: 250 ft with no AD and SBA  Balance: Sitting and standing balance WNL  Stairs:  Pt ascended/descended 1 flight(s) with No Assistive Device with right handrail with Stand-by Assistance.     AM-PAC 6 CLICK MOBILITY  Total Score:24       Treatment and Education:  None given    AM-PAC 6 CLICK MOBILITY  Total Score:24     Patient left supine with call button in reach, bed alarm on, and nurse notified.    GOALS:   Multidisciplinary Problems       Physical Therapy Goals       Not on file                    History:     Past Medical History:   Diagnosis Date    MI (myocardial infarction)     Stroke        Past Surgical History:   Procedure Laterality Date     SECTION      CORONARY ARTERY BYPASS GRAFT         Time Tracking:     PT Received On: 23  PT Start Time: 903     PT Stop Time: 922  PT Total Time (min): 19 min     Billable Minutes: Evaluation 2023

## 2023-01-09 NOTE — PLAN OF CARE
Notified CHRISTIAN Mckenna that patient's echo report is in. Per CHRISTIAN Mckenna, patient is clear for d/c.

## 2023-01-09 NOTE — PROGRESS NOTES
Pt cleared for discharge from case management    Appt w/ Dr. Khan 1/19 at 9:30am, on AVS; called [facility] [phone], spoke to Antionette and transferred to dali Martinez message asking her to call pt to schedule appt, left pt's name, date of birth and phone #.   01/09/23 7858   Final Note   Assessment Type Final Discharge Note   Anticipated Discharge Disposition Home   What phone number can be called within the next 1-3 days to see how you are doing after discharge?   ([phone])   Hospital Resources/Appts/Education Provided Appointments scheduled and added to AVS

## 2023-01-09 NOTE — TELEPHONE ENCOUNTER
Handled in another encounter.  Pharm called and pt messaged updates. All should be completed for this refill at this time.

## 2023-01-10 ENCOUNTER — TELEPHONE (OUTPATIENT)
Dept: MEDSURG UNIT | Facility: HOSPITAL | Age: 58
End: 2023-01-10
Payer: COMMERCIAL

## 2023-01-10 NOTE — PROGRESS NOTES
Food and nutrition Education    Diet education:Stoke Education      Time spent:15 min     Learners:Patient     Nutrition education provided with hand outs: Yes     Comments: PMH:MI, Stroke 2x (1996), Obesity, Hyperlipidemia, anemia, Thrombocytopenia.   Pt stated  cooks most meals at home. Usually uses salt at each meal, both during cooking process and while additional salt used at table. Pt stated her and  does not eat out often but will go out to eat two times a month. Usually eats a well balanced meal at dinner.  Pt educated on Stroke diet. Pt was receptive and attentive during education,    All Questions and concerns answered. Yes     Dietitians contact information provided. Yes     Follow up: Yes   Please re-consult as needed

## 2023-01-12 ENCOUNTER — TELEPHONE (OUTPATIENT)
Dept: NEUROSURGERY | Facility: CLINIC | Age: 58
End: 2023-01-12
Payer: COMMERCIAL

## 2023-01-12 ENCOUNTER — TELEPHONE (OUTPATIENT)
Dept: CARDIOLOGY | Facility: CLINIC | Age: 58
End: 2023-01-12
Payer: COMMERCIAL

## 2023-01-12 NOTE — TELEPHONE ENCOUNTER
Pt was recently admitted to the ED for a CVA. She saw a neurologist yesterday and they recommended a holter or a loop recorder be done. Pt will fax orders since neurologist is not with [facility] for Dr. Chavira to review and see what steps need to be taken next.     ----- Message from Jasmin Viera sent at 1/12/2023  8:01 AM CST -----  Contact: PT  Type:  Needs Medical Advice    Who Called: PT   Symptoms (please be specific): PT WAS DC Monday 01/09/23. PT WAS ADVISED TO F/U WITH CARDIOLOGIST FOR A HEART MONITOR AND LOOP RECORDER    How long has patient had these symptoms:  ADMITTED ON SAT 01/07/23 FOR AN EAR INFECTION DUE TO TEST RESULT PT WAS REFERRED TO HER CARDIOLOGIST   Pharmacy name and phone #:    [id number] - [place], [place] - [address] [facility] [facility]  [address]  [place] [address]  Phone: [phone] Fax: [phone]  Would the patient rather a call back or a response via [facility]? CALL   Best Call Back Number: [phone] (home)     Additional Information: PLEASE CALL PT TO ADVISE

## 2023-01-12 NOTE — TELEPHONE ENCOUNTER
----- Message from Stacey M Lefort sent at 1/12/2023 11:35 AM CST -----  Pt needs to make a referral appt. She can be reached at [phone]. Thank you.

## 2023-01-13 ENCOUNTER — PATIENT MESSAGE (OUTPATIENT)
Dept: INTERNAL MEDICINE | Facility: CLINIC | Age: 58
End: 2023-01-13
Payer: COMMERCIAL

## 2023-01-13 DIAGNOSIS — I63.9 STROKE OF UNKNOWN CAUSE: ICD-10-CM

## 2023-01-13 DIAGNOSIS — I63.9 CRYPTOGENIC STROKE: Primary | ICD-10-CM

## 2023-01-13 RX ORDER — SODIUM CHLORIDE 9 MG/ML
INJECTION, SOLUTION INTRAVENOUS CONTINUOUS
Status: CANCELLED | OUTPATIENT
Start: 2023-01-13

## 2023-01-17 ENCOUNTER — PATIENT MESSAGE (OUTPATIENT)
Dept: CARDIOLOGY | Facility: HOSPITAL | Age: 58
End: 2023-01-17
Payer: COMMERCIAL

## 2023-01-17 ENCOUNTER — TELEPHONE (OUTPATIENT)
Dept: CARDIOLOGY | Facility: CLINIC | Age: 58
End: 2023-01-17
Payer: COMMERCIAL

## 2023-01-17 NOTE — TELEPHONE ENCOUNTER
Spoke with pt and let her know that we did receive the fax. The cardiopulmonary team is going to reach out to her and get her scheduled.     ----- Message from Dougie Rausch sent at 1/17/2023 12:05 PM CST -----  Regarding: Holter Moniter Order  Type: Needs Medical Advice    Who Called:  Renéejelly Gipson Call Back Number: [phone]    Additional Information: pt f/u on msg sent last week about getting order for holter moniter. Please call to let know if got fax.

## 2023-01-27 ENCOUNTER — HOSPITAL ENCOUNTER (OUTPATIENT)
Facility: HOSPITAL | Age: 58
Discharge: HOME | End: 2023-01-27
Attending: INTERNAL MEDICINE | Admitting: INTERNAL MEDICINE
Payer: COMMERCIAL

## 2023-01-27 ENCOUNTER — HOSPITAL ENCOUNTER (EMERGENCY)
Facility: HOSPITAL | Age: 58
Discharge: HOME | End: 2023-01-27
Attending: EMERGENCY MEDICINE
Payer: COMMERCIAL

## 2023-01-27 VITALS
DIASTOLIC BLOOD PRESSURE: 65 MMHG | HEIGHT: 64 IN | HEART RATE: 75 BPM | SYSTOLIC BLOOD PRESSURE: 117 MMHG | TEMPERATURE: 98 F | RESPIRATION RATE: 17 BRPM | BODY MASS INDEX: 31.58 KG/M2 | WEIGHT: 185 LBS | OXYGEN SATURATION: 98 %

## 2023-01-27 DIAGNOSIS — I63.9 CRYPTOGENIC STROKE: ICD-10-CM

## 2023-01-27 DIAGNOSIS — T14.8XXA BLEEDING FROM WOUND: Primary | ICD-10-CM

## 2023-01-27 DIAGNOSIS — I63.9 STROKE OF UNKNOWN CAUSE: ICD-10-CM

## 2023-01-27 PROCEDURE — 33285 INSJ SUBQ CAR RHYTHM MNTR: CPT | Mod: ,,, | Performed by: INTERNAL MEDICINE

## 2023-01-27 PROCEDURE — 33285 PR INSERTION,SUBQ CARDIAC RHYTHM MONITOR, W/PRGRMG: ICD-10-PCS | Mod: ,,, | Performed by: INTERNAL MEDICINE

## 2023-01-27 PROCEDURE — 99281 EMR DPT VST MAYX REQ PHY/QHP: CPT

## 2023-01-27 PROCEDURE — 25000003 PHARM REV CODE 250

## 2023-01-27 PROCEDURE — 33285 INSJ SUBQ CAR RHYTHM MNTR: CPT | Performed by: INTERNAL MEDICINE

## 2023-01-27 PROCEDURE — 93291 INTERROG DEV EVAL SCRMS IP: CPT | Performed by: INTERNAL MEDICINE

## 2023-01-27 PROCEDURE — C1764 EVENT RECORDER, CARDIAC: HCPCS | Performed by: INTERNAL MEDICINE

## 2023-01-27 DEVICE — LUX-DX™ INSERTABLE CARDIAC MONITOR
Type: IMPLANTABLE DEVICE | Site: CHEST  WALL | Status: FUNCTIONAL
Brand: LUX-DX™ INSERTABLE CARDIAC MONITOR

## 2023-01-27 RX ORDER — LIDOCAINE HYDROCHLORIDE 20 MG/ML
10 INJECTION, SOLUTION INFILTRATION; PERINEURAL ONCE
Status: COMPLETED | OUTPATIENT
Start: 2023-01-27 | End: 2023-01-27

## 2023-01-27 RX ORDER — SODIUM CHLORIDE 9 MG/ML
INJECTION, SOLUTION INTRAVENOUS CONTINUOUS
Status: DISCONTINUED | OUTPATIENT
Start: 2023-01-27 | End: 2023-01-27 | Stop reason: HOSPADM

## 2023-01-27 RX ADMIN — LIDOCAINE HYDROCHLORIDE 10 ML: 20 INJECTION, SOLUTION INFILTRATION; PERINEURAL at 09:01

## 2023-01-27 NOTE — DISCHARGE SUMMARY
[facility] - Cath Lab  Discharge Note  Short Stay    Procedure(s) (LRB):  Insertion, Implantable Loop Recorder (Left)      OUTCOME: Patient tolerated treatment/procedure well without complication and is now ready for discharge.    DISPOSITION: Home or Self Care    FINAL DIAGNOSIS:  Cryptogenic stroke    FOLLOWUP: In clinic 1 week for wound check.    DISCHARGE INSTRUCTIONS:  Continue current Rx  Leave dressing on for 48 hours    Discharge Procedure Orders   Diet general        TIME SPENT ON DISCHARGE: 10 minutes

## 2023-01-27 NOTE — Clinical Note
The site was marked. The left chest was prepped. The site was prepped with ChloraPrep. The patient was draped. The patient was positioned supine. Prepped by Dr. Chavira

## 2023-01-27 NOTE — ED PROVIDER NOTES
Encounter Date: 2023    SCRIBE #1 NOTE: I, Morteza Wiggins, am scribing for, and in the presence of,  Mook Duarte III, MD.     History     Chief Complaint   Patient presents with    Post-op Problem     Loop recorder placed this AM, bleeding to site, bleeding controlled in triage with dressing in place upon arrival     Time seen by provider: 2:14 PM on 2023    Renée Goff is a 57 y.o. female who presents to the ED with a post-op problem. The patient reports having a loop recorder placed earlier this morning. She states that she is experiencing pain and bleeding at the site. History obtained from independent historian: The patient's spouse states that the patient had a loop recorder placed due to a hx of strokes. He also mentions that she has slow heart beats. The patient denies any other symptoms at this time. PMHx of stroke and MI. PSHx of CABG.    The history is provided by the patient and the spouse.   Review of patient's allergies indicates:  No Known Allergies  Past Medical History:   Diagnosis Date    MI (myocardial infarction)     Stroke      Past Surgical History:   Procedure Laterality Date     SECTION      CORONARY ARTERY BYPASS GRAFT       Family History   Problem Relation Age of Onset    Asthma Mother     Cancer Father     Heart attack Maternal Grandfather     Breast cancer Maternal Grandmother      Social History     Tobacco Use    Smoking status: Never    Smokeless tobacco: Never   Substance Use Topics    Alcohol use: No     Alcohol/week: 0.0 standard drinks    Drug use: No     Review of Systems   Constitutional:  Negative for fever.   Respiratory:  Negative for shortness of breath.    Genitourinary:  Negative for flank pain.   Musculoskeletal:  Negative for gait problem.   Neurological:  Negative for weakness.   Psychiatric/Behavioral:  Negative for confusion.      Physical Exam     Initial Vitals [23 1340]   BP Pulse Resp Temp SpO2   117/65 73 20 98 °F (36.7 °C) 100 %       MAP       --         Physical Exam    Nursing note and vitals reviewed.  Constitutional: She appears well-developed and well-nourished.   HENT:   Head: Normocephalic and atraumatic.   Eyes: Conjunctivae are normal.   Neck: Neck supple.   Normal range of motion.  Cardiovascular:  Normal rate, regular rhythm and normal heart sounds.     Exam reveals no gallop and no friction rub.       No murmur heard.  Pulmonary/Chest: Effort normal and breath sounds normal. No respiratory distress. She has no wheezes. She has no rhonchi. She has no rales.   1 cm incision over left peristernal region with oozing blood noted. No swelling or erythema noted. No calor.   Abdominal: Abdomen is soft. She exhibits no distension. There is no abdominal tenderness.   Musculoskeletal:         General: Normal range of motion.      Cervical back: Normal range of motion and neck supple.     Neurological: She is alert and oriented to person, place, and time.   Skin: Skin is warm and dry. No erythema.   Psychiatric: She has a normal mood and affect.       ED Course   Procedures  Labs Reviewed - No data to display         Imaging Results    None          Medications - No data to display  Medical Decision Making:   History:   Old Medical Records: I decided to obtain old medical records.  ED Management:  57-year-old female presents with oozing from a sternal incision male today for placement of a loop recorder.  She has a slow ooze from the wound.  Wound adhesive is applied with no further bleeding.  She has no evidence of significant blood loss.     APC / Resident Notes:   I, Dr. Mook Duarte III, personally performed the services described in this documentation. All medical record entries made by the scribe were at my direction and in my presence.  I have reviewed the chart and agree that the record reflects my personal performance and is accurate and complete   Scribe Attestation:   Scribe #1: I performed the above scribed service and the  documentation accurately describes the services I performed. I attest to the accuracy of the note.                   Clinical Impression:   Final diagnoses:  [T14.8XXA] Bleeding from wound (Primary)        ED Disposition Condition    Discharge Stable          ED Prescriptions    None       Follow-up Information       Follow up With Specialties Details Why Contact Info    Thomas Khan MD Family Medicine In 1 week  [address]  [facility] [address]  [phone]               Mook Duarte III, MD  01/27/23 0181

## 2023-01-27 NOTE — H&P
" Renée Goff is a 57 y.o. female who presents for evaluation of No chief complaint on file.  For prior ASD with recurrent CVA, palpitations, right-sided weakness with memory loss in 1/2017, LARES with making bed with abnormal Echo, controlled HLD, for ILR implant per neurology request. Cyndee Marks MD     PCP: Thomas Khan MD, [facility]   Prior Cardiologist: Dr. Jenkins  ENT: Dr. Mensah  Neurologist: Dr. Henderson, now Dr. 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  Nicotine: never  Alcohol: twice a week, single glass, the most in any 24 hours.  Illicit drugs: none  Cardiac symptoms: less LARES, palpitations at night down to 1 nights weekly  Home BP: do not check  Medication compliance: yes  Diet: regular, some salt  Caffeine: 7 cpd, no sleep problem    HPI: recent DC from [facility], 1/9/2023  DCS "57-year-old female who presents emergency room for evaluation of acute left hearing loss with left facial droop. Patient reported that while at rest she noticed the sudden onset of loss of hearing from the left ear. She denied any ear pain. Her  noticed she had a small amount of left facial drooping at the same time. Patient also endorse some paresthesias in the left hand at the time of this incident. Patient denies any other complaints. She denies any fever or chills. No known sick contacts or travel. She is vaccinated for COVID.     CTA of the head and neck were negative for any acute findings. ER physician spoke with Neurology who recommended MRI with and without contrast in the a.m. " "with internal auditory canal protocol. ER physician performed a cerumen impaction removal with resolution of her hearing loss.    Hospital Course:   Patient was monitored closely throughout course of hospital stay by hospital medicine team. Neurology consulted on admission. Patient underwent MRI imaging showing no acute process. MRI did demonstrate incidental 8 mm right inferior frontal convexity partially calcified presumed meningioma. Echo obtained showing EF 48%, no PFO, mild left atrial enlargement.. Patient's hearing loss resolved after cerumen impaction removal. PT/OT consulted on admission. Started on secondary stroke prevention with ASA 81 mg daily, Plavix 75 mg daily, and atorvastatin 20 mg daily. Neurology cleared for discharge with follow up in [facility] in 3-5 days. Patient admission symptoms resolved over course of stay. Patient to be discharged home with DAPT for 3 weeks followed by ASA alone therapy."    Echo - There is no evidence of intracardiac shunting.  The left ventricle is normal in size with eccentric hypertrophy and mildly decreased systolic function.  The estimated ejection fraction is 48%.  There are segmental left ventricular wall motion abnormalities.  Mild tricuspid regurgitation.  Mild pulmonic regurgitation.  Grade I left ventricular diastolic dysfunction.  Mild left atrial enlargement.  Mild mitral regurgitation.  Mild right atrial enlargement.  The estimated PA systolic pressure is 23 mmHg.  Normal right ventricular size with normal right ventricular systolic function.  Normal central venous pressure (3 mmHg).    ROS      Constitutional: negative for chills, no fatigue, no fevers, sweats, weight stable since 7/2020  HENT: no sinus congestion, no further hoarseness or sore throat.  Eyes: negative for icterus, redness and visual disturbance  Respiratory: negative for asthma, cough, still with mild dyspnea on exertion, no hemoptysis, pleurisy/chest pain and wheezing, Miami score 2, " "snoring noted but awaken refreshed.  Cardiovascular: no chest pain, no chest pressure/discomfort, dyspnea, near-syncope, positive for night time palpitations but improved and LARES, no paroxysmal nocturnal dyspnea and syncope  Gastrointestinal: no abdominal pain, heart burn, no nausea and vomiting  Musculoskeletal: negative for arthralgias, muscle weakness and myalgias, some back pain with bending, keep moving  Neurological: negative for coordination problems, gait problems, tremors, less dizziness and vertigo, rare headaches / migraine with nausea, and paresthesia, occasional migraine HA, can last up to 2 days. Some difficulties in concentration and coordination, no fall but do lose balance easily.  Psych: no depression or anxiety, no further memory loss, some anxiety    Physical Exam      General appearance: alert, appears stated age, cooperative and no distress, RA O2 sat 99%  Head: Normocephalic, without obvious abnormality, atraumatic  Eyes:  conjunctivae/corneas clear. PERRL, EOM's intact.   Neck: no adenopathy, no carotid bruit, no JVD, supple, symmetrical, trachea midline and thyroid not enlarged, symmetric, no tenderness/mass/nodules  Lungs:  clear to auscultation bilaterally  Chest wall: no further tenderness  Heart: bradycardic rate and rhythm, S1, S2 normal, no murmur, click, rub or gallop   Abdomen: soft, non-tender; bowel sounds normal; no masses,  no organomegaly, waist circumference 35.25" increased to 39.5", hip 41"  Extremities: extremities normal, atraumatic, no cyanosis or edema  Pulses: 2+ and symmetric    Assessment:  1. Cardiovascular risk factor    2. Elevated hemoglobin A1c    3. Cerebrovascular accident (CVA) due to embolism of left middle cerebral artery. 1996, 1/2023   4. LARES (dyspnea on exertion), onset 1/2019    5. ASD (atrial septal defect), closed 12/1996    6. Abdominal obesity    7. Iron deficiency      Plan: Proceed with ILR implant  Procedure, risks, and alternatives discussed with " patient and family member with full comprehension. All questions answered. They wish to proceed.      [employer] rep notified.

## 2023-02-14 ENCOUNTER — TELEPHONE (OUTPATIENT)
Dept: NEUROSURGERY | Facility: CLINIC | Age: 58
End: 2023-02-14
Payer: COMMERCIAL

## 2023-02-14 NOTE — TELEPHONE ENCOUNTER
----- Message from Stacey M Lefort sent at 2/14/2023 11:24 AM CST -----  Pt is requesting a callback at [phone]. Thank you.

## 2023-02-14 NOTE — TELEPHONE ENCOUNTER
----- Message from Stacey M Lefort sent at 2/14/2023  3:07 PM CST -----  Pt is requesting a callback at [phone]. Thank you.

## 2023-02-14 NOTE — TELEPHONE ENCOUNTER
Returned call to pt and reschedule appt per pt request. Pt voiced understanding to new appt details.

## 2023-02-16 ENCOUNTER — TELEPHONE (OUTPATIENT)
Dept: NEUROSURGERY | Facility: CLINIC | Age: 58
End: 2023-02-16
Payer: COMMERCIAL

## 2023-02-16 NOTE — TELEPHONE ENCOUNTER
Returned call to pt and rescheduled appt to sooner date per pt request. Pt voiced understanding to new appt details.

## 2023-02-16 NOTE — TELEPHONE ENCOUNTER
----- Message from Stacey M Lefort sent at 2/16/2023 11:08 AM CST -----  Pt needs a callback at [phone]. She needs to talk to you about rescheduling her appt time. Thank you.

## 2023-02-24 DIAGNOSIS — G43.009 MIGRAINE WITHOUT AURA AND WITHOUT STATUS MIGRAINOSUS, NOT INTRACTABLE: ICD-10-CM

## 2023-02-24 NOTE — TELEPHONE ENCOUNTER
Care Due:                  Date            Visit Type   Department     Provider  --------------------------------------------------------------------------------                                EP -                              PRIMARY      [facility] INTERNAL  Thomas Rodriguez  Last Visit: 10-      [facility] ([facility])   [facility]  Next Visit: None Scheduled  None         None Found                                                            Last  Test          Frequency    Reason                     Performed    Due Date  --------------------------------------------------------------------------------    Office Visit  12 months..  montelukast..............  10-   10-    [facility] Embedded Care Gaps. Reference number: [id number]. 2/24/2023   6:23:08 AM CST

## 2023-02-24 NOTE — TELEPHONE ENCOUNTER
Refill Encounter sent pt a portal message to schedule annual    PCP Visits: Recent Visits  No visits were found meeting these conditions.  Showing recent visits within past 360 days and meeting all other requirements  Future Appointments  No visits were found meeting these conditions.  Showing future appointments within next 720 days and meeting all other requirements     Last 3 Blood Pressure:   BP Readings from Last 3 Encounters:   01/27/23 117/65   01/09/23 117/75   10/24/22 104/71     Preferred Pharmacy:   [facility]/pharmacy #[id number] - [place] - [address]  [address] [address]  Phone: [phone] Fax: [phone]    Requested RX:  Requested Prescriptions     Pending Prescriptions Disp Refills    ALPRAZolam (XANAX) 0.5 MG tablet [Pharmacy Med Name: ALPRAZOLAM 0.5 MG TABLET] 60 tablet 0     Sig: TAKE 1 TABLET BY MOUTH TWICE A DAY      RX Route: Normal

## 2023-02-27 RX ORDER — ALPRAZOLAM 0.5 MG/1
TABLET ORAL
Qty: 60 TABLET | Refills: 0 | Status: SHIPPED | OUTPATIENT
Start: 2023-02-27 | End: 2023-03-09 | Stop reason: SDUPTHER

## 2023-03-09 ENCOUNTER — OFFICE VISIT (OUTPATIENT)
Dept: NEUROSURGERY | Facility: CLINIC | Age: 58
End: 2023-03-09
Payer: COMMERCIAL

## 2023-03-09 ENCOUNTER — OFFICE VISIT (OUTPATIENT)
Dept: INTERNAL MEDICINE | Facility: CLINIC | Age: 58
End: 2023-03-09
Attending: FAMILY MEDICINE
Payer: COMMERCIAL

## 2023-03-09 VITALS
BODY MASS INDEX: 32.29 KG/M2 | SYSTOLIC BLOOD PRESSURE: 153 MMHG | DIASTOLIC BLOOD PRESSURE: 88 MMHG | HEIGHT: 64 IN | WEIGHT: 189.13 LBS | HEART RATE: 59 BPM

## 2023-03-09 VITALS — BODY MASS INDEX: 31.76 KG/M2 | WEIGHT: 185 LBS

## 2023-03-09 DIAGNOSIS — G43.009 MIGRAINE WITHOUT AURA AND WITHOUT STATUS MIGRAINOSUS, NOT INTRACTABLE: ICD-10-CM

## 2023-03-09 DIAGNOSIS — Z86.69 HISTORY OF MIGRAINE HEADACHES: ICD-10-CM

## 2023-03-09 DIAGNOSIS — D32.0 CEREBRAL CONVEXITY MENINGIOMA: Primary | ICD-10-CM

## 2023-03-09 DIAGNOSIS — F41.9 ANXIETY: Primary | ICD-10-CM

## 2023-03-09 PROCEDURE — 99214 PR OFFICE/OUTPT VISIT, EST, LEVL IV, 30-39 MIN: ICD-10-PCS | Mod: 95,,, | Performed by: FAMILY MEDICINE

## 2023-03-09 PROCEDURE — 99204 OFFICE O/P NEW MOD 45 MIN: CPT | Mod: S$GLB,,, | Performed by: NEUROLOGICAL SURGERY

## 2023-03-09 PROCEDURE — 99214 OFFICE O/P EST MOD 30 MIN: CPT | Mod: 95,,, | Performed by: FAMILY MEDICINE

## 2023-03-09 PROCEDURE — 99204 PR OFFICE/OUTPT VISIT, NEW, LEVL IV, 45-59 MIN: ICD-10-PCS | Mod: S$GLB,,, | Performed by: NEUROLOGICAL SURGERY

## 2023-03-09 RX ORDER — ALPRAZOLAM 0.5 MG/1
0.5 TABLET ORAL 2 TIMES DAILY
Qty: 12 TABLET | Refills: 0 | Status: SHIPPED | OUTPATIENT
Start: 2023-03-09 | End: 2023-04-10 | Stop reason: SDUPTHER

## 2023-03-09 NOTE — PROGRESS NOTES
HPI:  This is a very pleasant 57-year-old woman with a history of migraine headaches, hypertension, TIA, anxiety, and CAD on Plavix and aspirin who was recently hospitalized with stroke-like symptoms including left hearing loss and left facial droop.  She was noted to have cerumen impaction which following removal resulted in improved hearing.  She had transient left facial droop which resolved.  She was evaluated for a stroke which was negative.  She endorses bifrontal and retro-orbital headaches with associated photophobia and phonophobia.  She notes when the headaches occur she is down for 2 days.  She is accompanied by her  who also reports that she develops stroke-like symptoms with severe headaches.  This was a case relative to her recent hospitalization.  The facial droop was proceeded with severe migraine headache.  She denies past or present issues diplopia, speech difficulty, extremity weakness, paresthesias, gait disturbance, sphincter dysfunction.  She is taking Elavil and Fioricet for headaches.    MRI IAC was obtained during recent hospitalization and demonstrated small right convexity meningioma prompting neurosurgical referral        Past Medical History:   Diagnosis Date    MI (myocardial infarction)     Stroke       Past Surgical History:   Procedure Laterality Date     SECTION      CORONARY ARTERY BYPASS GRAFT      INSERTION OF IMPLANTABLE LOOP RECORDER Left 2023    Procedure: Insertion, Implantable Loop Recorder;  Surgeon: Giacomo Chavira MD;  Location: [facility] MINIMALLY-INVASIVE CARDIOLOGY;  Service: Cardiology;  Laterality: Left;     Social History     Tobacco Use    Smoking status: Never    Smokeless tobacco: Never   Substance Use Topics    Alcohol use: No     Alcohol/week: 0.0 standard drinks    Drug use: No       Review of Systems: All systems reviewed and are as above or otherwise negative.    General: well developed, well nourished, no distress.   Head: normocephalic,  atraumatic  Eyes: pupils equal, round, reactive to light with accomodation, EOMI.   Neck: trachea midline. No JVD  Cardiovascular: No LE edema   Pulmonary: normal respirations, no signs of respiratory distress  Abdomen: soft, non-distended, not tender to palpation  Sensory: intact to light touch throughout  Extremities: No bruising, deformity  Skin: Skin is warm, dry and intact.    Motor Strength: Moves all extremities spontaneously with good tone.  Full strength upper and lower extremities. No abnormal movements seen.     Strength  Deltoids Triceps Biceps Wrist Extension Wrist Flexion Hand    Upper: R 5/5 5/5 5/5 5/5 5/5 5/5    L 5/5 5/5 5/5 5/5 5/5 5/5     Iliopsoas Quadriceps Knee  Flexion Tibialis  anterior Gastro- cnemius EHL   Lower: R 5/5 5/5 5/5 5/5 5/5 5/5    L 5/5 5/5 5/5 5/5 5/5 5/5     Neurologic: Alert and oriented. Thought content appropriate.  GCS: Motor: 6/Verbal: 5/Eyes: 4 GCS Total: 15  Mental Status: Awake, Alert, Oriented x 4  Language: No aphasia  Speech: No dysarthria  Cranial nerves: face symmetric, tongue midline, CN II-XII grossly intact.     Pronator Drift: no drift noted  Finger-to-nose: Intact bilaterally  DTR's: 2 + and symmetric in UE and LE  Brennan: absent  Clonus: absent  Babinski: absent    Gait: normal    Tandem Gait: No difficulty           Able to walk on heels & toes    Cervical Spine  ROM: full    Nontender to palpation    Lumbar Spine   ROM: full   Nontender to palpation    Pain on Hip ROM: Negative  Straight leg raise: negative bilaterally     SI Joint tenderness: Negative bilaterally   ROSA: Negative bilaterally  Thigh Thrust: Negative bilaterally  Gaenslen: Negative bilaterally    Significant Exam Findings:  Cranial nerves 2-12 intact.  Motor and sensory intact.  Romberg negative.  Tandem walking normal    Significant Radiology Findings:  I reviewed MRI brain performed in 2017 as well as MRI IAC/temporal W/WO obtained 01/08/2023 with the patient and her .  No  acute intracranial process.  No IAC pathology. 8 x 4 mm right convexity extra-axial mass unchanged from 2017.  No mass effect, edema, shift.  I reviewed CTA head and neck 01/08/2023 which was normal    Analysis:  The above findings likely represent small incidental benign meningioma.  There is no indication for surgical intervention.  I recommended surveillance imaging in 1 year.  Her symptomatology is consistent with migraine headaches with stroke-like symptoms when severe.  I recommend continued management with Dr. Mtz, Neurology.    Renée MORENO was seen today for headache.    Diagnoses and all orders for this visit:    Cerebral convexity meningioma  -     MRI Brain W WO Contrast; Future    History of migraine headaches  -     MRI Brain W WO Contrast; Future     I spent a total of 45 minutes on the day of the visit.  This includes face to face time and non-face to face time preparing to see the patient (eg, review of tests), obtaining and/or reviewing separately obtained history, documenting clinical information in the electronic or other health record, independently interpreting results and communicating results to the patient/family/caregiver, or care coordinator.

## 2023-03-09 NOTE — PROGRESS NOTES
The patient location is:  Home  The chief complaint leading to consultation is:  Medication management    Visit type: audiovisual    Face to Face time with patient:  10 minutes  Fifteen minutes of total time spent on the encounter, which includes face to face time and non-face to face time preparing to see the patient (eg, review of tests), Obtaining and/or reviewing separately obtained history, Documenting clinical information in the electronic or other health record, Independently interpreting results (not separately reported) and communicating results to the patient/family/caregiver, or Care coordination (not separately reported).         Each patient to whom he or she provides medical services by telemedicine is:  (1) informed of the relationship between the physician and patient and the respective role of any other health care provider with respect to management of the patient; and (2) notified that he or she may decline to receive medical services by telemedicine and may withdraw from such care at any time.    Notes:   CHIEF COMPLAINT:  Anxiety    HISTORY OF PRESENT ILLNESS: The patient presents with medication refill.  She has not been seen in some time.  We will arrange for her to be seen in person as soon as possible.  Plan will be as below    She has a fairly complex medical history including a distant history of CVA due to an ASD in 1996.    REVIEW OF SYSTEMS:  GENERAL: No fever, chills, fatigability or weight loss.  SKIN: No rashes, itching or changes in color or texture of skin.  Except as above  HEAD: No headaches or recent head trauma.  EYES: Visual acuity fine. No photophobia, ocular pain or diplopia.  EARS: Denies ear pain, discharge or vertigo.  NOSE: No loss of smell, no epistaxis or postnasal drip.  MOUTH & THROAT: No hoarseness or change in voice. No excessive gum bleeding.  NODES: Denies swollen glands.  CHEST: Denies LARES, cyanosis, wheezing, cough and sputum production.  CARDIOVASCULAR: Denies  chest pain, PND, orthopnea or reduced exercise tolerance.  ABDOMEN: Appetite fine. No weight loss. Denies diarrhea, hematemesis or blood in stool.  URINARY: No hematuria.  PERIPHERAL VASCULAR: No claudication or cyanosis.  MUSCULOSKELETAL: No joint stiffness or swelling.   NEUROLOGIC: No history of seizures, paralysis, alteration of gait or coordination.    PHYSICAL EXAMINATION:   Wt 83.9 kg (185 lb)   LMP 10/12/2012   BMI 31.76 kg/m²     APPEARANCE: Well nourished, well developed, in no acute distress.    HEAD: Normocephalic, atraumatic.  EYES: PERRL. EOMI.  Conjunctivae without injection and  anicteric  NEUROLOGIC:       Normal speech development.      Hearing normal.  PSYCHIATRIC: Patient is alert and oriented x3.  Thought processes are all normal.  There is no homicidality.  There is no suicidality.  There is no evidence of psychosis.    LABORATORY/RADIOLOGY: Chart reviewed.    ASSESSMENT:   GERD with esophagitis  Anxiety  Migraine headaches    PLAN:  Patient needs to be seen in person before any further refills.  Xanax refilled for short course  Return to clinic later this year          Answers submitted by the patient for this visit:  Review of Systems Questionnaire (Submitted on 3/8/2023)  activity change: No  unexpected weight change: No  neck pain: No  hearing loss: No  rhinorrhea: No  trouble swallowing: No  eye discharge: No  visual disturbance: No  chest tightness: No  wheezing: No  chest pain: No  palpitations: No  blood in stool: No  constipation: No  vomiting: No  diarrhea: No  polydipsia: No  polyuria: No  difficulty urinating: No  hematuria: No  menstrual problem: No  dysuria: No  joint swelling: No  arthralgias: No  headaches: No  weakness: No  confusion: No  dysphoric mood: No

## 2023-04-10 ENCOUNTER — OFFICE VISIT (OUTPATIENT)
Dept: INTERNAL MEDICINE | Facility: CLINIC | Age: 58
End: 2023-04-10
Attending: FAMILY MEDICINE
Payer: COMMERCIAL

## 2023-04-10 VITALS
WEIGHT: 193.56 LBS | HEIGHT: 64 IN | SYSTOLIC BLOOD PRESSURE: 110 MMHG | HEART RATE: 64 BPM | OXYGEN SATURATION: 100 % | DIASTOLIC BLOOD PRESSURE: 70 MMHG | BODY MASS INDEX: 33.05 KG/M2

## 2023-04-10 DIAGNOSIS — S39.012A STRAIN OF LUMBAR REGION, INITIAL ENCOUNTER: ICD-10-CM

## 2023-04-10 DIAGNOSIS — Z00.00 PREVENTATIVE HEALTH CARE: Primary | ICD-10-CM

## 2023-04-10 DIAGNOSIS — G43.009 MIGRAINE WITHOUT AURA AND WITHOUT STATUS MIGRAINOSUS, NOT INTRACTABLE: ICD-10-CM

## 2023-04-10 DIAGNOSIS — Z12.31 ENCOUNTER FOR SCREENING MAMMOGRAM FOR MALIGNANT NEOPLASM OF BREAST: ICD-10-CM

## 2023-04-10 PROCEDURE — 99396 PREV VISIT EST AGE 40-64: CPT | Mod: S$GLB,,, | Performed by: FAMILY MEDICINE

## 2023-04-10 PROCEDURE — 99999 PR PBB SHADOW E&M-EST. PATIENT-LVL IV: CPT | Mod: PBBFAC,,, | Performed by: FAMILY MEDICINE

## 2023-04-10 PROCEDURE — 99396 PR PREVENTIVE VISIT,EST,40-64: ICD-10-PCS | Mod: S$GLB,,, | Performed by: FAMILY MEDICINE

## 2023-04-10 PROCEDURE — 99999 PR PBB SHADOW E&M-EST. PATIENT-LVL IV: ICD-10-PCS | Mod: PBBFAC,,, | Performed by: FAMILY MEDICINE

## 2023-04-10 RX ORDER — MULTIVITAMIN
1 TABLET ORAL
COMMUNITY

## 2023-04-10 RX ORDER — ASPIRIN 81 MG/1
1 TABLET ORAL
COMMUNITY

## 2023-04-10 RX ORDER — CYCLOBENZAPRINE HCL 10 MG
10 TABLET ORAL 3 TIMES DAILY PRN
Qty: 30 TABLET | Refills: 1 | Status: SHIPPED | OUTPATIENT
Start: 2023-04-10 | End: 2023-12-04

## 2023-04-10 RX ORDER — METHOCARBAMOL 500 MG/1
1 TABLET, FILM COATED ORAL
COMMUNITY
End: 2023-04-10 | Stop reason: SDUPTHER

## 2023-04-10 RX ORDER — ALPRAZOLAM 0.5 MG/1
0.5 TABLET ORAL 2 TIMES DAILY
Qty: 12 TABLET | Refills: 0 | Status: SHIPPED | OUTPATIENT
Start: 2023-04-10 | End: 2023-05-11 | Stop reason: SDUPTHER

## 2023-04-10 RX ORDER — METHOCARBAMOL 500 MG/1
500 TABLET, FILM COATED ORAL DAILY
Qty: 30 TABLET | Refills: 0 | Status: SHIPPED | OUTPATIENT
Start: 2023-04-10 | End: 2023-05-08

## 2023-04-10 NOTE — PROGRESS NOTES
"CHIEF COMPLAINT:  Annual    HISTORY OF PRESENT ILLNESS: The patient presents with annual.     she is due for her colonoscopy in a couple of years.    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    REVIEW OF SYSTEMS:  GENERAL: No fever, chills, fatigability or weight loss.  SKIN: No rashes, itching or changes in color or texture of skin.  HEAD: No headaches or recent head trauma.  EYES: Visual acuity fine. No photophobia, ocular pain or diplopia.  EARS: Denies ear pain, discharge or vertigo.  NOSE: No loss of smell, no epistaxis or postnasal drip.  MOUTH & THROAT: No hoarseness or change in voice. No excessive gum bleeding.  NODES: Denies swollen glands.  CHEST: Denies LARES, cyanosis, wheezing, cough and sputum production.  CARDIOVASCULAR: Denies chest pain, PND, orthopnea or reduced exercise tolerance.  ABDOMEN: Appetite fine. No weight loss. Denies diarrhea, hematemesis or blood in stool.  URINARY: No hematuria.  PERIPHERAL VASCULAR: No claudication or cyanosis.  MUSCULOSKELETAL: No joint stiffness or swelling.   NEUROLOGIC: No history of seizures, paralysis, alteration of gait or coordination.    PHYSICAL EXAMINATION:     /70   Pulse 64   Ht 5' 4" (1.626 m)   Wt 87.8 kg (193 lb 9 oz)   LMP 10/12/2012   SpO2 100%   BMI 33.23 kg/m²     APPEARANCE: Well nourished, well developed, in no acute distress.    HEAD: Normocephalic, atraumatic.  EYES: PERRL. EOMI.  Conjunctivae without injection and  anicteric  NOSE: Mucosa pink. Airway clear.  MOUTH & THROAT: No tonsillar enlargement. No pharyngeal erythema or exudate. No stridor.  NECK: Supple.   NODES: No cervical, axillary or inguinal lymph node enlargement.  ABDOMEN: Bowel sounds normal. Not distended. Soft. No tenderness or masses.  No ascites is noted.  MUSCULOSKELETAL:  There is no clubbing, cyanosis, or edema of the extremities x4.  There is full range of motion of the lumbar spine.  There is full " range of motion of the extremities x4.  There is no deformity noted.  There is significant spasm involving the lumbar region bilaterally.  NEUROLOGIC:       Normal speech development.      Hearing normal.      Antalgic gait.      Motor and sensory exams grossly normal.  PSYCHIATRIC: Patient is alert and oriented x3.  Thought processes are all normal.  There is no homicidality.  There is no suicidality.  There is no evidence of psychosis.    LABORATORY/RADIOLOGY: Chart reviewed.    ASSESSMENT:   Annual  GERD with esophagitis  Anxiety    PLAN:  We will follow-up blood work which we expect to be normal.    Xanax refkiersten  Return to clinic later this year

## 2023-04-12 ENCOUNTER — PATIENT MESSAGE (OUTPATIENT)
Dept: ADMINISTRATIVE | Facility: HOSPITAL | Age: 58
End: 2023-04-12
Payer: COMMERCIAL

## 2023-04-18 ENCOUNTER — TELEPHONE (OUTPATIENT)
Dept: INTERNAL MEDICINE | Facility: CLINIC | Age: 58
End: 2023-04-18
Payer: COMMERCIAL

## 2023-04-18 NOTE — TELEPHONE ENCOUNTER
----- Message from Jaquelin Diaz sent at 4/18/2023  9:18 AM CDT -----  Name of Who is Calling: DENTON REYES [[id number]]            What is the request in detail: Patient is requesting call back to get prescribed medicine for strep throat              Can the clinic reply by [facility]: YES              [facility] [phone]

## 2023-04-18 NOTE — TELEPHONE ENCOUNTER
Spoke wit the pt who stated she has strep throat going around her office. Pt is having a sore throat starting today with ear and jaw pain. Pt would like some antibiotics called into [facility] on [address] the 24 hour one.

## 2023-04-19 ENCOUNTER — TELEPHONE (OUTPATIENT)
Dept: INTERNAL MEDICINE | Facility: CLINIC | Age: 58
End: 2023-04-19
Payer: COMMERCIAL

## 2023-04-19 ENCOUNTER — TELEPHONE (OUTPATIENT)
Dept: INTERNAL MEDICINE | Facility: CLINIC | Age: 58
End: 2023-04-19

## 2023-04-19 NOTE — TELEPHONE ENCOUNTER
----- Message from Russ Abbott sent at 4/18/2023  1:01 PM CDT -----  Name of Who is Calling: DENTON REYES [[id number]]                 What is the request in detail: Patient is requesting call back to get prescribed medicine for strep throat.Please contact to further discuss and advise.                     Can the clinic reply by [facility]: YES                    What [facility] Back [facility]: [phone]

## 2023-04-19 NOTE — TELEPHONE ENCOUNTER
----- Message from Russ Abbott sent at 4/18/2023  1:01 PM CDT -----  Name of Who is Calling: DENTON REYES [[id number]]                 What is the request in detail: Patient is requesting call back to get prescribed medicine for strep throat.Please contact to further discuss and advise.                     Can the clinic reply by [facility]: YES                    What [facility] Back [facility] [phone]

## 2023-04-19 NOTE — TELEPHONE ENCOUNTER
Spoke with pt who stated she went to urgent care last night and they told her she did not have strep. Pt stated she is feeling better.

## 2023-05-05 DIAGNOSIS — S39.012A STRAIN OF LUMBAR REGION, INITIAL ENCOUNTER: ICD-10-CM

## 2023-05-05 NOTE — TELEPHONE ENCOUNTER
Refill Routing Note   Medication(s) are not appropriate for processing by [facility] for the following reason(s):      Medication outside of protocol    [facility] action(s):  Route            Appointments  past 12m or future 3m with PCP    Date Provider   Last Visit   4/10/2023 Thomas Khan MD   Next Visit   Visit date not found Thomas Khan MD   ED visits in past 90 days: 0        Note composed:7:45 AM 05/05/2023

## 2023-05-05 NOTE — TELEPHONE ENCOUNTER
No care due was identified.  [facility] Embedded Care Due Messages. Reference number: [id number].   5/05/2023 6:19:17 AM CDT

## 2023-05-05 NOTE — TELEPHONE ENCOUNTER
Noted. 1 month provided until pt established care with new provider.    Spoke with patient. Results given and follow up appointment scheduled.

## 2023-05-05 NOTE — TELEPHONE ENCOUNTER
Refill Encounter    PCP Visits: Recent Visits  Date Type Provider Dept   04/10/23 Office Visit Thomas Khan MD [facility] Internal Medicine   03/09/23 Office Visit Thomas Khan MD [facility] Internal Medicine   Showing recent visits within past 360 days and meeting all other requirements  Future Appointments  No visits were found meeting these conditions.  Showing future appointments within next 720 days and meeting all other requirements     Last 3 Blood Pressure:   BP Readings from Last 3 Encounters:   04/10/23 110/70   03/09/23 (!) 153/88   01/27/23 117/65     Preferred Pharmacy:   [facility]/pharmacy #[id number] - [place] - [address]  [address] [address]  Phone: [phone] Fax: [phone]    Requested RX:  Requested Prescriptions     Pending Prescriptions Disp Refills    methocarbamoL (ROBAXIN) 500 MG Tab [Pharmacy Med Name: METHOCARBAMOL 500 MG TABLET] 30 tablet 0     Sig: TAKE 1 TABLET BY MOUTH EVERY DAY      RX Route: Normal

## 2023-05-08 RX ORDER — METHOCARBAMOL 500 MG/1
TABLET, FILM COATED ORAL
Qty: 30 TABLET | Refills: 0 | Status: SHIPPED | OUTPATIENT
Start: 2023-05-08 | End: 2023-06-19

## 2023-05-11 DIAGNOSIS — G43.009 MIGRAINE WITHOUT AURA AND WITHOUT STATUS MIGRAINOSUS, NOT INTRACTABLE: ICD-10-CM

## 2023-05-11 RX ORDER — ALPRAZOLAM 0.5 MG/1
0.5 TABLET ORAL 2 TIMES DAILY
Qty: 60 TABLET | Refills: 0 | Status: SHIPPED | OUTPATIENT
Start: 2023-05-11 | End: 2023-05-12 | Stop reason: SDUPTHER

## 2023-05-11 NOTE — TELEPHONE ENCOUNTER
Duplicate encounter. Pt requesting a 90 day supply Patient comment: I get this medicine refilled every 3 months . Thats not what was ordered this time. Can someone look into this and please get back with me . Im almost completely out . Thank you !

## 2023-05-11 NOTE — TELEPHONE ENCOUNTER
----- Message from Shyam Flores sent at 5/11/2023  7:56 AM CDT -----  Name of Who is Calling: DENTON REYES [[id number]]           What is the request in detail: Patient is requesting a call back to discuss medication.  Patient states ALPRAZolam (XANAX) 0.5 MG tablet was ordered for 12 tablets.  Patient states refill is typically for 90 tablets.  Please assist.           Can the clinic reply by [facility]: No           What Number to Call Back if not [facility]: [phone]

## 2023-05-11 NOTE — TELEPHONE ENCOUNTER
No care due was identified.  Health [facility] Embedded Care Due Messages. Reference number: [id number].   5/11/2023 3:05:07 PM CDT

## 2023-05-12 NOTE — TELEPHONE ENCOUNTER
No care due was identified.  Health [facility] Embedded Care Due Messages. Reference number: [id number].   5/12/2023 1:04:17 PM CDT

## 2023-05-15 RX ORDER — ALPRAZOLAM 0.5 MG/1
0.5 TABLET ORAL 2 TIMES DAILY
Qty: 60 TABLET | Refills: 0 | Status: SHIPPED | OUTPATIENT
Start: 2023-05-15 | End: 2023-09-26

## 2023-06-01 ENCOUNTER — TELEPHONE (OUTPATIENT)
Dept: INTERNAL MEDICINE | Facility: CLINIC | Age: 58
End: 2023-06-01
Payer: COMMERCIAL

## 2023-06-01 NOTE — TELEPHONE ENCOUNTER
----- Message from Venessa Moreno sent at 6/1/2023 11:07 AM CDT -----  Name of Who is Calling:DENTON REYES [[id number]]              What is the request in detail:Patient is going on a cruise and would like to know if there is anything she can take for sea sickness.               Can the clinic reply by [facility]:no              What Number to Call Back if not in [facility]:[phone]

## 2023-06-01 NOTE — TELEPHONE ENCOUNTER
Patient is going on a cruise and will like to know what recommendations you have for sea sickness. Please inform.     Routed to Dr. Khan for recommendations. Thanks.

## 2023-06-06 ENCOUNTER — TELEPHONE (OUTPATIENT)
Dept: INTERNAL MEDICINE | Facility: CLINIC | Age: 58
End: 2023-06-06
Payer: COMMERCIAL

## 2023-06-06 ENCOUNTER — PATIENT MESSAGE (OUTPATIENT)
Dept: INTERNAL MEDICINE | Facility: CLINIC | Age: 58
End: 2023-06-06
Payer: COMMERCIAL

## 2023-06-06 NOTE — TELEPHONE ENCOUNTER
----- Message from Rossy Navarrete sent at 6/5/2023  4:19 PM CDT -----   Name of Who is Calling:     What is the request in detail: patient request call back in reference to scheduled appointment / patient is scheduled for 7:30 and daughter  Hilda moran  MRN  [id number]  is scheduled for 8:00 /patient want to know if can switch appointment times   Please contact to further discuss and advise      Can the clinic reply by [facility]:     What Number to Call Back if not in [facility]:  [phone]

## 2023-06-18 DIAGNOSIS — S39.012A STRAIN OF LUMBAR REGION, INITIAL ENCOUNTER: ICD-10-CM

## 2023-06-18 NOTE — TELEPHONE ENCOUNTER
No care due was identified.  [facility] Embedded Care Due Messages. Reference number: [id number].   6/18/2023 9:56:59 AM CDT

## 2023-06-19 RX ORDER — PANTOPRAZOLE SODIUM 40 MG/1
TABLET, DELAYED RELEASE ORAL
Qty: 90 TABLET | Refills: 3 | Status: SHIPPED | OUTPATIENT
Start: 2023-06-19

## 2023-06-19 RX ORDER — METHOCARBAMOL 500 MG/1
TABLET, FILM COATED ORAL
Qty: 30 TABLET | Refills: 0 | Status: SHIPPED | OUTPATIENT
Start: 2023-06-19 | End: 2023-07-18

## 2023-06-19 NOTE — TELEPHONE ENCOUNTER
Refill Routing Note   Medication(s) are not appropriate for processing by [facility] for the following reason(s):      Medication outside of protocol : METHOCARBAMOL     [facility] action(s):  Route  Approve None identified            Appointments  past 12m or future 3m with PCP    Date Provider   Last Visit   4/10/2023 Thomas Khan MD   Next Visit   Visit date not found Thomas Khan MD   ED visits in past 90 days: 0        Note composed:8:42 AM 06/19/2023

## 2023-06-26 ENCOUNTER — TELEPHONE (OUTPATIENT)
Dept: INTERNAL MEDICINE | Facility: CLINIC | Age: 58
End: 2023-06-26
Payer: COMMERCIAL

## 2023-06-26 NOTE — TELEPHONE ENCOUNTER
Returned call to Ms. Archibald She did not recognize two names on her prescriptions. They are CHRISTIAN Philip, and CHRISTIAN Jones. Advised that both of these individuals  have written prescriptions for her.       [facility] is advising Atorvastatin dosage increased to 40 mg. Called pharmacy. [facility] patient recently presented them with a RX  dated 01/11/23 from   Dr. Cyndee tMz in [place] for this dosage. Informed Ms. Goff of this. [facility] Dr. Mtz is her neurologist. Will ask her PCP does he want her on 20 mg or 40 mg Lipitor.

## 2023-06-26 NOTE — TELEPHONE ENCOUNTER
----- Message from Marci Kriss sent at 6/26/2023  1:49 PM CDT -----  Contact: DENTON REYES [[id number]]  Type: Call Back      Who called: DENTON REYES [[id number]]      What is the request in detail: Patient is requesting a call back in regard to 2 medications that is at the pharmacy for her. She do not recognize the physicians name on script.    Please advise.     Can the clinic reply by [facility]? Yes      Would the patient rather a call back or a response via [facility]? Call back       Best call back number: [phone] (home)       Additional Information:

## 2023-06-27 NOTE — TELEPHONE ENCOUNTER
Called Ms. Goff. The listed message regarding her Lipitor from Dr. Khan was given to her. States she will do so.    From Dr. Khan  Patient should contact her neurologist for final dose of Lipitor

## 2023-07-17 DIAGNOSIS — S39.012A STRAIN OF LUMBAR REGION, INITIAL ENCOUNTER: ICD-10-CM

## 2023-07-17 NOTE — TELEPHONE ENCOUNTER
No care due was identified.  [facility] Embedded Care Due Messages. Reference number: [id number].   7/17/2023 3:27:42 PM CDT

## 2023-07-18 RX ORDER — METHOCARBAMOL 500 MG/1
TABLET, FILM COATED ORAL
Qty: 30 TABLET | Refills: 0 | Status: SHIPPED | OUTPATIENT
Start: 2023-07-18 | End: 2023-08-22

## 2023-08-15 ENCOUNTER — PATIENT MESSAGE (OUTPATIENT)
Dept: INTERNAL MEDICINE | Facility: CLINIC | Age: 58
End: 2023-08-15
Payer: COMMERCIAL

## 2023-08-15 DIAGNOSIS — Z12.31 ENCOUNTER FOR SCREENING MAMMOGRAM FOR MALIGNANT NEOPLASM OF BREAST: Primary | ICD-10-CM

## 2023-08-18 ENCOUNTER — TELEPHONE (OUTPATIENT)
Dept: SLEEP MEDICINE | Facility: OTHER | Age: 58
End: 2023-08-18
Payer: COMMERCIAL

## 2023-08-19 DIAGNOSIS — S39.012A STRAIN OF LUMBAR REGION, INITIAL ENCOUNTER: ICD-10-CM

## 2023-08-19 NOTE — TELEPHONE ENCOUNTER
No care due was identified.  Health [facility] Embedded Care Due Messages. Reference number: [id number].   8/19/2023 10:06:07 AM CDT

## 2023-08-21 NOTE — TELEPHONE ENCOUNTER
Refill Encounter    PCP Visits: Recent Visits  Date Type Provider Dept   04/10/23 Office Visit Thomas Khan MD [facility] Internal Medicine   03/09/23 Office Visit Thomas Khan MD [facility] Internal Medicine   Showing recent visits within past 360 days and meeting all other requirements  Future Appointments  No visits were found meeting these conditions.  Showing future appointments within next 720 days and meeting all other requirements     Last 3 Blood Pressure:   BP Readings from Last 3 Encounters:   04/10/23 110/70   03/09/23 (!) 153/88   01/27/23 117/65     Preferred Pharmacy:   [facility]/pharmacy #[id number] - [place] - [address]  [address] [place] [address]  Phone: [phone] Fax: [phone]    Requested RX:  Requested Prescriptions     Pending Prescriptions Disp Refills    methocarbamoL (ROBAXIN) 500 MG Tab [Pharmacy Med Name: METHOCARBAMOL 500 MG TABLET] 30 tablet 0     Sig: TAKE 1 TABLET BY MOUTH EVERY DAY      RX Route: Normal

## 2023-08-21 NOTE — TELEPHONE ENCOUNTER
Refill Routing Note   Medication(s) are not appropriate for processing by [facility] for the following reason(s):      - Outside of protocol    [facility] action(s):  Route          Medication reconciliation completed: No     Appointments  past 12m or future 3m with PCP    Date Provider   Last Visit   4/10/2023 Thomas Khan MD   Next Visit   Visit date not found Thomas Khan MD   ED visits in past 90 days: 0        Note composed:10:58 AM 08/21/2023

## 2023-08-22 RX ORDER — METHOCARBAMOL 500 MG/1
TABLET, FILM COATED ORAL
Qty: 30 TABLET | Refills: 0 | Status: SHIPPED | OUTPATIENT
Start: 2023-08-22

## 2023-08-23 ENCOUNTER — HOSPITAL ENCOUNTER (OUTPATIENT)
Dept: SLEEP MEDICINE | Facility: OTHER | Age: 58
Discharge: HOME | End: 2023-08-23
Attending: NURSE PRACTITIONER
Payer: COMMERCIAL

## 2023-08-23 DIAGNOSIS — G47.30 SLEEP APNEA, UNSPECIFIED TYPE: ICD-10-CM

## 2023-08-23 DIAGNOSIS — G47.33 OSA (OBSTRUCTIVE SLEEP APNEA): Primary | ICD-10-CM

## 2023-08-23 PROCEDURE — 95800 SLP STDY UNATTENDED: CPT

## 2023-08-24 PROBLEM — G47.30 SLEEP APNEA: Status: ACTIVE | Noted: 2023-08-24

## 2023-08-29 PROBLEM — G47.33 OSA (OBSTRUCTIVE SLEEP APNEA): Status: ACTIVE | Noted: 2023-08-24

## 2023-08-29 PROCEDURE — 95806 SLEEP STUDY UNATT&RESP EFFT: CPT | Mod: 26,52,, | Performed by: PSYCHIATRY & NEUROLOGY

## 2023-08-29 PROCEDURE — 95806 PR SLEEP STUDY, UNATTENDED, SIMUL RECORD HR/O2 SAT/RESP FLOW/RESP EFFT: ICD-10-PCS | Mod: 26,52,, | Performed by: PSYCHIATRY & NEUROLOGY

## 2023-09-06 ENCOUNTER — PATIENT MESSAGE (OUTPATIENT)
Dept: INTERNAL MEDICINE | Facility: CLINIC | Age: 58
End: 2023-09-06
Payer: COMMERCIAL

## 2023-09-06 DIAGNOSIS — G43.019 COMMON MIGRAINE WITH INTRACTABLE MIGRAINE: ICD-10-CM

## 2023-09-07 RX ORDER — AMITRIPTYLINE HYDROCHLORIDE 10 MG/1
40 TABLET, FILM COATED ORAL NIGHTLY
Qty: 360 TABLET | Refills: 1 | Status: SHIPPED | OUTPATIENT
Start: 2023-09-07 | End: 2024-03-18

## 2023-09-09 ENCOUNTER — PATIENT MESSAGE (OUTPATIENT)
Dept: SLEEP MEDICINE | Facility: CLINIC | Age: 58
End: 2023-09-09
Payer: COMMERCIAL

## 2023-09-14 ENCOUNTER — PATIENT MESSAGE (OUTPATIENT)
Dept: SLEEP MEDICINE | Facility: CLINIC | Age: 58
End: 2023-09-14
Payer: COMMERCIAL

## 2023-09-14 DIAGNOSIS — G47.33 OSA (OBSTRUCTIVE SLEEP APNEA): Primary | ICD-10-CM

## 2023-09-18 ENCOUNTER — HOSPITAL ENCOUNTER (OUTPATIENT)
Dept: RADIOLOGY | Facility: HOSPITAL | Age: 58
Discharge: HOME | End: 2023-09-18
Attending: FAMILY MEDICINE
Payer: COMMERCIAL

## 2023-09-18 VITALS — HEIGHT: 64 IN | WEIGHT: 193.56 LBS | BODY MASS INDEX: 33.05 KG/M2

## 2023-09-18 DIAGNOSIS — Z12.31 ENCOUNTER FOR SCREENING MAMMOGRAM FOR MALIGNANT NEOPLASM OF BREAST: ICD-10-CM

## 2023-09-18 PROCEDURE — 77067 SCR MAMMO BI INCL CAD: CPT | Mod: TC,PO

## 2023-09-25 DIAGNOSIS — G43.009 MIGRAINE WITHOUT AURA AND WITHOUT STATUS MIGRAINOSUS, NOT INTRACTABLE: ICD-10-CM

## 2023-09-25 RX ORDER — ALPRAZOLAM 0.5 MG/1
0.5 TABLET ORAL 2 TIMES DAILY
Qty: 60 TABLET | Refills: 0 | Status: CANCELLED | OUTPATIENT
Start: 2023-09-25

## 2023-09-25 NOTE — TELEPHONE ENCOUNTER
No care due was identified.  Health [facility] Embedded Care Due Messages. Reference number: [id number].   9/25/2023 5:53:28 AM CDT

## 2023-09-25 NOTE — TELEPHONE ENCOUNTER
No care due was identified.  [facility] Embedded Care Due Messages. Reference number: [id number].   9/25/2023 5:50:17 AM CDT   Patient has not shown up for his appointment to have PICC line flushed.

## 2023-09-26 RX ORDER — ALPRAZOLAM 0.5 MG/1
0.5 TABLET ORAL 2 TIMES DAILY
Qty: 60 TABLET | Refills: 0 | Status: SHIPPED | OUTPATIENT
Start: 2023-09-26 | End: 2023-11-27

## 2023-09-26 NOTE — TELEPHONE ENCOUNTER
Refill Encounter    PCP Visits: Recent Visits  Date Type Provider Dept   04/10/23 Office Visit Thomas Khan MD [facility] Internal Medicine   03/09/23 Office Visit Thomas Khan MD [facility] Internal Medicine   Showing recent visits within past 360 days and meeting all other requirements  Future Appointments  No visits were found meeting these conditions.  Showing future appointments within next 720 days and meeting all other requirements     Last 3 Blood Pressure:   BP Readings from Last 3 Encounters:   04/10/23 110/70   03/09/23 (!) 153/88   01/27/23 117/65     Preferred Pharmacy:   [facility]/pharmacy #[id number] - [place] - [address]  [address] [place] [address]  Phone: [phone] Fax: [phone]    Requested RX:  Requested Prescriptions     Pending Prescriptions Disp Refills    ALPRAZolam (XANAX) 0.5 MG tablet [Pharmacy Med Name: ALPRAZOLAM 0.5 MG TABLET] 60 tablet 0     Sig: TAKE 1 TABLET BY MOUTH 2 TIMES DAILY.      RX Route: Normal

## 2023-10-19 DIAGNOSIS — G43.009 MIGRAINE WITHOUT AURA AND WITHOUT STATUS MIGRAINOSUS, NOT INTRACTABLE: ICD-10-CM

## 2023-10-19 DIAGNOSIS — D32.9 MENINGIOMA: ICD-10-CM

## 2023-10-19 DIAGNOSIS — I51.7 MILD LEFT ATRIAL ENLARGEMENT: ICD-10-CM

## 2023-10-19 DIAGNOSIS — G45.9 TIA (TRANSIENT ISCHEMIC ATTACK): Primary | ICD-10-CM

## 2023-10-23 ENCOUNTER — TELEPHONE (OUTPATIENT)
Dept: CARDIOLOGY | Facility: CLINIC | Age: 58
End: 2023-10-23
Payer: COMMERCIAL

## 2023-10-23 NOTE — TELEPHONE ENCOUNTER
Talked to the loop recorder rep she stated they are MRI compatible. Called the pt and let her know.     ----- Message from Fareed Adams sent at 10/23/2023  7:49 AM CDT -----  Type: Needs Medical Advice  Who Called:  pt  Symptoms (please be specific):  pt said she need to speak to the nurse--said she have a MRI scheduled and she need to know if that can be done with her having a loop recorder--please call and advise  Best Call Back Number: [phone] (home)     Additional Information: thank you

## 2023-11-06 ENCOUNTER — HOSPITAL ENCOUNTER (OUTPATIENT)
Dept: RADIOLOGY | Facility: HOSPITAL | Age: 58
Discharge: HOME | End: 2023-11-06
Attending: STUDENT IN AN ORGANIZED HEALTH CARE EDUCATION/TRAINING PROGRAM
Payer: COMMERCIAL

## 2023-11-06 ENCOUNTER — LAB VISIT (OUTPATIENT)
Dept: LAB | Facility: HOSPITAL | Age: 58
End: 2023-11-06
Attending: STUDENT IN AN ORGANIZED HEALTH CARE EDUCATION/TRAINING PROGRAM
Payer: COMMERCIAL

## 2023-11-06 DIAGNOSIS — D32.9 BENIGN NEOPLASM OF MENINGES: ICD-10-CM

## 2023-11-06 DIAGNOSIS — I51.7 CARDIOMEGALY: ICD-10-CM

## 2023-11-06 DIAGNOSIS — D32.9 MENINGIOMA: ICD-10-CM

## 2023-11-06 DIAGNOSIS — G43.009 MIGRAINE WITHOUT AURA AND WITHOUT STATUS MIGRAINOSUS, NOT INTRACTABLE: ICD-10-CM

## 2023-11-06 DIAGNOSIS — G45.9 TIA (TRANSIENT ISCHEMIC ATTACK): ICD-10-CM

## 2023-11-06 DIAGNOSIS — G45.9 INTERMITTENT CEREBRAL ISCHEMIA: Primary | ICD-10-CM

## 2023-11-06 DIAGNOSIS — G43.009 COMMON MIGRAINE: ICD-10-CM

## 2023-11-06 DIAGNOSIS — I51.7 MILD LEFT ATRIAL ENLARGEMENT: ICD-10-CM

## 2023-11-06 LAB
CHOLEST SERPL-MCNC: 149 MG/DL (ref 120–199)
CHOLEST/HDLC SERPL: 2.8 {RATIO} (ref 2–5)
HDLC SERPL-MCNC: 53 MG/DL (ref 40–75)
HDLC SERPL: 35.6 % (ref 20–50)
LDLC SERPL CALC-MCNC: 71.8 MG/DL (ref 63–159)
NONHDLC SERPL-MCNC: 96 MG/DL
TRIGL SERPL-MCNC: 121 MG/DL (ref 30–150)

## 2023-11-06 PROCEDURE — 70551 MRI BRAIN STEM W/O DYE: CPT | Mod: TC,PO

## 2023-11-06 PROCEDURE — 80061 LIPID PANEL: CPT | Performed by: STUDENT IN AN ORGANIZED HEALTH CARE EDUCATION/TRAINING PROGRAM

## 2023-11-06 PROCEDURE — 36415 COLL VENOUS BLD VENIPUNCTURE: CPT | Mod: PO | Performed by: STUDENT IN AN ORGANIZED HEALTH CARE EDUCATION/TRAINING PROGRAM

## 2023-11-26 DIAGNOSIS — G43.009 MIGRAINE WITHOUT AURA AND WITHOUT STATUS MIGRAINOSUS, NOT INTRACTABLE: ICD-10-CM

## 2023-11-26 DIAGNOSIS — R00.2 PALPITATIONS: ICD-10-CM

## 2023-11-26 NOTE — TELEPHONE ENCOUNTER
No care due was identified.  Health [facility] Embedded Care Due Messages. Reference number: [id number].   11/26/2023 8:42:05 AM CST

## 2023-11-27 RX ORDER — ALPRAZOLAM 0.5 MG/1
0.5 TABLET ORAL 2 TIMES DAILY
Qty: 60 TABLET | Refills: 0 | Status: SHIPPED | OUTPATIENT
Start: 2023-11-27 | End: 2024-01-29

## 2023-11-27 RX ORDER — METOPROLOL TARTRATE 50 MG/1
50 TABLET ORAL 2 TIMES DAILY
Qty: 180 TABLET | Refills: 0 | Status: SHIPPED | OUTPATIENT
Start: 2023-11-27 | End: 2024-01-29

## 2023-11-27 NOTE — TELEPHONE ENCOUNTER
Refill Encounter    PCP Visits: Recent Visits  Date Type Provider Dept   04/10/23 Office Visit Thomas Khan MD [facility] Internal Medicine   03/09/23 Office Visit Thomas Khan MD [facility] Internal Medicine   Showing recent visits within past 360 days and meeting all other requirements  Future Appointments  No visits were found meeting these conditions.  Showing future appointments within next 720 days and meeting all other requirements     Last 3 Blood Pressure:   BP Readings from Last 3 Encounters:   04/10/23 110/70   03/09/23 (!) 153/88   01/27/23 117/65     Preferred Pharmacy:   [facility]/pharmacy #[id number] - [place] - [address]  [address] [place] [address]  Phone: [phone] Fax: [phone]    Requested RX:  Requested Prescriptions     Pending Prescriptions Disp Refills    ALPRAZolam (XANAX) 0.5 MG tablet [Pharmacy Med Name: ALPRAZOLAM 0.5 MG TABLET] 60 tablet 0     Sig: TAKE 1 TABLET BY MOUTH TWICE A DAY      RX Route: Normal

## 2023-12-04 DIAGNOSIS — S39.012A STRAIN OF LUMBAR REGION, INITIAL ENCOUNTER: ICD-10-CM

## 2023-12-04 RX ORDER — CYCLOBENZAPRINE HCL 10 MG
TABLET ORAL
Qty: 30 TABLET | Refills: 1 | Status: SHIPPED | OUTPATIENT
Start: 2023-12-04

## 2023-12-04 NOTE — TELEPHONE ENCOUNTER
No care due was identified.  [facility] Embedded Care Due Messages. Reference number: [id number].   12/04/2023 1:20:36 PM CST

## 2023-12-17 ENCOUNTER — PATIENT MESSAGE (OUTPATIENT)
Dept: INTERNAL MEDICINE | Facility: CLINIC | Age: 58
End: 2023-12-17
Payer: COMMERCIAL

## 2023-12-17 DIAGNOSIS — J45.50 SEVERE PERSISTENT ASTHMA WITHOUT COMPLICATION: ICD-10-CM

## 2023-12-17 DIAGNOSIS — J20.9 ACUTE BRONCHITIS, UNSPECIFIED ORGANISM: ICD-10-CM

## 2023-12-18 RX ORDER — MONTELUKAST SODIUM 10 MG/1
10 TABLET ORAL DAILY
Qty: 90 TABLET | Refills: 3 | Status: SHIPPED | OUTPATIENT
Start: 2023-12-18

## 2023-12-18 RX ORDER — FLUTICASONE PROPIONATE 50 MCG
SPRAY, SUSPENSION (ML) NASAL
Qty: 48 ML | Refills: 1 | Status: SHIPPED | OUTPATIENT
Start: 2023-12-18

## 2023-12-18 NOTE — TELEPHONE ENCOUNTER
No care due was identified.  [facility] Embedded Care Due Messages. Reference number: [id number].   12/18/2023 3:26:25 PM CST

## 2023-12-18 NOTE — TELEPHONE ENCOUNTER
Refill Encounter    PCP Visits: Recent Visits  Date Type Provider Dept   04/10/23 Office Visit Thomas Khan MD [facility] Internal Medicine   23 Office Visit Thomas Khan MD [facility] Internal Medicine   Showing recent visits within past 360 days and meeting all other requirements  Future Appointments  No visits were found meeting these conditions.  Showing future appointments within next 720 days and meeting all other requirements     Last 3 Blood Pressure:   BP Readings from Last 3 Encounters:   04/10/23 110/70   23 (!) 153/88   23 117/65     Preferred Pharmacy:   [facility]/pharmacy #[id number] - [place] - [address]  [address] [place] [address]  Phone: [phone] Fax: [phone]    Requested RX:  Requested Prescriptions     Pending Prescriptions Disp Refills    montelukast (SINGULAIR) 10 mg tablet 90 tablet 3     Sig: Take 1 tablet (10 mg total) by mouth once daily.    fluticasone propionate (FLONASE) 50 mcg/actuation nasal spray 48 mL 1     Si sprays (100 mcg total) by Each Nostril route once daily.      RX Route: Normal

## 2023-12-18 NOTE — TELEPHONE ENCOUNTER
No care due was identified.  Health [facility] Embedded Care Due Messages. Reference number: [id number].   12/17/2023 10:31:38 AM CST

## 2023-12-18 NOTE — TELEPHONE ENCOUNTER
Refill Routing Note   Medication(s) are not appropriate for processing by [facility] for the following reason(s):        No active prescription written by provider    [facility] action(s):  Approve  Defer               Appointments  past 12m or future 3m with PCP    Date Provider   Last Visit   4/10/2023 Thomas Khan MD   Next Visit   Visit date not found Thomas Khan MD   ED visits in past 90 days: 0        Note composed:12:12 PM 12/18/2023

## 2023-12-18 NOTE — TELEPHONE ENCOUNTER
No care due was identified.  Health [facility] Embedded Care Due Messages. Reference number: [id number].   12/17/2023 10:38:27 AM CST

## 2023-12-19 RX ORDER — PROMETHAZINE HYDROCHLORIDE AND DEXTROMETHORPHAN HYDROBROMIDE 6.25; 15 MG/5ML; MG/5ML
5 SYRUP ORAL EVERY 4 HOURS PRN
Qty: 120 ML | Refills: 1 | Status: SHIPPED | OUTPATIENT
Start: 2023-12-19 | End: 2023-12-29

## 2024-01-03 ENCOUNTER — OFFICE VISIT (OUTPATIENT)
Dept: CARDIOLOGY | Facility: CLINIC | Age: 59
End: 2024-01-03
Payer: COMMERCIAL

## 2024-01-03 VITALS
HEART RATE: 63 BPM | HEIGHT: 64 IN | WEIGHT: 192.69 LBS | OXYGEN SATURATION: 100 % | SYSTOLIC BLOOD PRESSURE: 129 MMHG | DIASTOLIC BLOOD PRESSURE: 62 MMHG | BODY MASS INDEX: 32.9 KG/M2

## 2024-01-03 DIAGNOSIS — E78.00 HYPERCHOLESTEROLEMIA: ICD-10-CM

## 2024-01-03 DIAGNOSIS — I51.9 LV DYSFUNCTION: ICD-10-CM

## 2024-01-03 DIAGNOSIS — Z95.818 STATUS POST PLACEMENT OF IMPLANTABLE LOOP RECORDER: ICD-10-CM

## 2024-01-03 DIAGNOSIS — R53.1 RIGHT SIDED WEAKNESS: ICD-10-CM

## 2024-01-03 DIAGNOSIS — R06.09 DOE (DYSPNEA ON EXERTION): Primary | ICD-10-CM

## 2024-01-03 DIAGNOSIS — R94.31 NONSPECIFIC ABNORMAL ELECTROCARDIOGRAM (ECG): ICD-10-CM

## 2024-01-03 DIAGNOSIS — E61.1 IRON DEFICIENCY: ICD-10-CM

## 2024-01-03 DIAGNOSIS — Q21.10 ASD (ATRIAL SEPTAL DEFECT): ICD-10-CM

## 2024-01-03 DIAGNOSIS — Z91.89 CARDIOVASCULAR RISK FACTOR: ICD-10-CM

## 2024-01-03 PROCEDURE — 93000 ELECTROCARDIOGRAM COMPLETE: CPT | Mod: S$GLB,,, | Performed by: INTERNAL MEDICINE

## 2024-01-03 PROCEDURE — 99999 PR PBB SHADOW E&M-EST. PATIENT-LVL IV: CPT | Mod: PBBFAC,,, | Performed by: INTERNAL MEDICINE

## 2024-01-03 PROCEDURE — 99215 OFFICE O/P EST HI 40 MIN: CPT | Mod: S$GLB,,, | Performed by: INTERNAL MEDICINE

## 2024-01-03 RX ORDER — BUTALBITAL AND ACETAMINOPHEN 325; 50 MG/1; MG/1
TABLET ORAL
COMMUNITY

## 2024-01-03 RX ORDER — RIMEGEPANT SULFATE 75 MG/75MG
TABLET, ORALLY DISINTEGRATING ORAL
COMMUNITY
Start: 2024-01-01

## 2024-01-03 RX ORDER — ESTRADIOL 0.1 MG/G
CREAM VAGINAL
COMMUNITY
Start: 2022-09-30

## 2024-01-03 RX ORDER — TOPIRAMATE 25 MG/1
25 CAPSULE, COATED PELLETS ORAL 2 TIMES DAILY
COMMUNITY
Start: 2023-10-19

## 2024-01-03 RX ORDER — ATORVASTATIN CALCIUM 40 MG/1
40 TABLET, FILM COATED ORAL
COMMUNITY
Start: 2023-12-19

## 2024-01-03 NOTE — PROGRESS NOTES
Subjective:    Patient ID:  Renée Goff is a 58 y.o. female who presents for evaluation of No chief complaint on file.    For prior ASD with CVA, palpitations, right-sided weakness with memory loss in 1/2017, LARES with stair climbing and abnormal Echo, controlled HLD, annual follow up.  PCP: Thomas Khan MD, [facility]   Prior Cardiologist: Dr. Jenkins  ENT: Dr. Mensah  Neurologist: Dr. Henderson, now Dr. 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  Nicotine: never  Alcohol: once a week, single glass, the most in any 24 hours.  Illicit drugs: none  Cardiac symptoms: less LARES, palpitations at night down to 1 nights weekly  Home BP: do not check  Medication compliance: yes, on 100 mg of metoprolol tartrate bid tried half dose for 3 weeks and troubled by tachycardia. Feel better on the medication.  Diet: regular, some salt  Caffeine: 5 cpd, no sleep problem  Labs: 1/2019, .6, not on Rx, normal CMP, CBC (eosinophil 19.3%), TSH, A1C 5.5%  Lab Results   Component Value Date    TSH 1.956 01/07/2023        Lab Results   Component Value Date    HGBA1C 5.4 01/07/2023       Lab Results   Component Value Date    WBC 4.56 01/09/2023    HGB 11.7 (L) 01/09/2023    HCT 36.1 (L) 01/09/2023    MCV 95 01/09/2023     01/09/2023       CMP  Sodium   Date Value Ref Range Status   01/09/2023 140 136 - 145 mmol/L Final     Potassium   Date Value Ref Range Status   01/09/2023 4.4 3.5 - 5.1 mmol/L Final     Chloride   Date Value Ref Range Status   01/09/2023 105 95 - 110 mmol/L Final     CO2    Date Value Ref Range Status   01/09/2023 26 23 - 29 mmol/L Final     Glucose   Date Value Ref Range Status   01/09/2023 87 70 - 110 mg/dL Final     BUN   Date Value Ref Range Status   01/09/2023 7 6 - 20 mg/dL Final     Creatinine   Date Value Ref Range Status   01/09/2023 0.8 0.5 - 1.4 mg/dL Final   11/14/2012 0.8 0.5 - 1.4 mg/dL Final     Calcium   Date Value Ref Range Status   01/09/2023 9.4 8.7 - 10.5 mg/dL Final   11/14/2012 9.6 8.7 - 10.5 mg/dL Final     Total Protein   Date Value Ref Range Status   01/09/2023 6.6 6.0 - 8.4 g/dL Final     Albumin   Date Value Ref Range Status   01/09/2023 3.6 3.5 - 5.2 g/dL Final     Total Bilirubin   Date Value Ref Range Status   01/09/2023 0.5 0.1 - 1.0 mg/dL Final     Comment:     For infants and newborns, interpretation of results should be based  on gestational age, weight and in agreement with clinical  observations.    Premature Infant recommended reference ranges:  Up to 24 hours.............<8.0 mg/dL  Up to 48 hours............<12.0 mg/dL  3-5 days..................<15.0 mg/dL  6-29 days.................<15.0 mg/dL       Alkaline Phosphatase   Date Value Ref Range Status   01/09/2023 98 55 - 135 U/L Final     AST   Date Value Ref Range Status   01/09/2023 24 10 - 40 U/L Final     ALT   Date Value Ref Range Status   01/09/2023 19 10 - 44 U/L Final     Anion Gap   Date Value Ref Range Status   01/09/2023 9 8 - 16 mmol/L Final   11/14/2012 13 5 - 15 meq/L Final     eGFR if    Date Value Ref Range Status   07/14/2020 >60 >60 mL/min/1.73 m^2 Final     eGFR if non    Date Value Ref Range Status   07/14/2020 >60 >60 mL/min/1.73 m^2 Final     Comment:     Calculation used to obtain the estimated glomerular filtration  rate (eGFR) is the CKD-EPI equation.        @labrcntip(troponini)@    BNP   Date Value Ref Range Status   05/20/2019 92 0 - 99 pg/mL Final     Comment:     Values of less than 100 pg/ml are consistent with non-CHF populations.    }   Lab Results   Component Value Date    CHOL 149 11/06/2023    CHOL 145 01/07/2023    CHOL 145 07/14/2020     Lab Results   Component Value Date    HDL 53 11/06/2023    HDL 52 01/07/2023    HDL 59 07/14/2020     Lab Results   Component Value Date    LDLCALC 71.8 11/06/2023    LDLCALC 76.8 01/07/2023    LDLCALC 68.4 07/14/2020     Lab Results   Component Value Date    TRIG 121 11/06/2023    TRIG 81 01/07/2023    TRIG 88 07/14/2020     Lab Results   Component Value Date    CHOLHDL 35.6 11/06/2023    CHOLHDL 35.9 01/07/2023    CHOLHDL 40.7 07/14/2020     Lab Results   Component Value Date    IRON 136 10/24/2022    TRANSFERRIN 218 10/24/2022    TIBC 323 10/24/2022    FESATURATED 42 10/24/2022       Last Echo: 7/2021  Last stress test: 1/2017  Cardiovascular angiogram: none  ECG: NSR rate 64, NSTWA  Fundoscopic exam: within the past year, negative for retinopathy    In 11/2012:  47 y.o. female admitted to Hospitalist Service from [facility] Emergency Room with complaint of chest pain 1998, history of CVA 1996 and history of migrain headache. Patient d, she was in her usual state of health, suddenly started experiencing substernal chest Pain, non-radiating, felt SOB. CP was constant. Chest pain was moderate in intensity.  No recent angina like complaints. Last cardiac stress test was in 1998. Patient was admitted to Hospitalist medicine service. Patient was evaluated by Dr. Chavira. Serial cardiac enzymes were negative.  Patient underwent Lexiscan which was negative for ischemia. During hospital stay, patient had a migrain headache which was medically managed. Patient was discharged home in stable condition with following discharge plan of care.     Cardiac evaluation, 11/2012:  ECHO CONCLUSIONS                                                                  1 - Mild left ventricular enlargement.                                       2 - Low normal left ventricular function (EF 51%).                            3 - Normal diastolic function.                                               4 - Right ventricle is upper limit of normal in size with normal             systolic function    Lexiscan:  Nuclear Quantitative Functional Analysis:                                    LVEF: 53 % (normal is 55 - 69)                                               LVED Volume: 105 ml (normal is 60 - 98)                                      LVES Volume: 49 ml (normal is 20 - 42)                                                                                                                    Impression: NORMAL MYOCARDIAL PERFUSION                                      1. The perfusion scan is free of evidence for myocardial ischemia or         injury.                                                                      2. There is a moderate intensity fixed defect in the anteroapical wall of    the left ventricle, secondary to breast attenuation.                         3. Resting wall motion is physiologic.                                       4. There is resting LV dysfunction with a reduced ejection fraction of 53    %.  (normal is 55 - 69)                                                      5. The ventricular volumes are normal at rest and stress.                    6. The extracardiac distribution of radioactivity is normal.    Since visit of 12/3/2012, no new problem, improve situation at home, caring for 3 grandson, bike twice a week for about 30 minutes with occasional walk, no regular exercise routine. Still working part-time. Denies any symptoms. EKG today NSR, rate of 70 with anterior T waves inversion.    Since visit of 1/27/2014, began experiencing sudden onset of vertigo after Maryanne, accompanied by nausea and unable to stand nor walk. Can last up to a few days, usually a day and a half. No fall. No problem with home medications. Migraine not a problem. 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 ECG continue to show  "anterior T-waves inversion. No CP unless out of propranolol. Last lipid in record, done 11/2012, showed LDL-C 119. ASCVD 10-year risk is only 0.74%, very, very very low.    In 10/2016, no new problem, less stress, now caring for grandchildren, active walking with stroller every other day and biking every other day. Noted some increase chest pounding this month, last up to 20 minutes, feels real anxious, no additional medication. Some SOB. Migraines are less, may be once every other week but can last up to 2 days. Mother passed in 11/2015, age 72.     In 11/2017, here for annual reviewed, had sudden onset of right-sided weakness with memory loss in 1/2017, hospitalized for 2 days, was on full dose ASA at the time.  DCS - "Hospital Course:   Patient is a 51 y.o. female admitted to Hospitalist Service from [facility] Room with complaint of chest pain for 1 day. Patient reportedly has past medical history significant for hypertension, CAD, history of AMI and CVA. Patient reported chest pain started last night which was radiating to arms and upper back. Patient has also been experiencing headache. No head injury, LOC or seizure activity reported. Patient denied any focal deficits. No associated nausea or diaphorsis reported. No leg swelling of calf tenderness. Patient denied shortness of breath, abdominal pain, nausea, vomiting, headache, vision changes, focal neuro-deficits, cough or fever. Patient was admitted to Hospitalist medicine service. Patient was evaluated by Dr. Zamora and Dr. Chavira. Patient was monitored on telemetry medical floor, serial cardiac enzymes remained negative. Patient was evaluated by cardiologist and had further cardiac workup as mentioned below, which was negative for acute ischemia. Patient's symptoms resolved. MRI brain did not confirm CVA. Neurological symptoms resolved. Patient encouraged to follow up closely and have amnesia work up completed. Patient was discharged home " "in stable condition with following discharge plan of care.      Consults: Dr. Zamora and Dr. Fan Montes:  1. The perfusion scan is free of evidence for myocardial ischemia or injury.   2. There is abnormal wall motion at rest showing moderate hypokinesis of the septal wall of the left ventricle.   3. Resting LV function is normal.  (normal is 55 - 69)  4. The ventricular volumes are normal at rest and stress.   5. The extracardiac distribution of radioactivity is normal.   6. There is evidence of right ventricular hypertrophy.   7. When compared to the previous study from 11/13/2012, the RV appears more prominent on current study.     MRI Brain: Negative MRI of the brain.  Chronic right maxillary and ethmoid sinusitis.  Carotid US: Significant atherosclerotic plaque or calcification is not identified in the carotid arteries by real-time ultrasound or NASCET criteria      ECHO:     1 - Low normal left ventricular systolic function (EF 50-55%).     2 - Normal left ventricular diastolic function.     3 - Right ventricle is upper limit of normal in size with normal systolic function.     4 - The estimated PA systolic pressure is greater than 31 mmHg.     5 - Difficult windows, even with the use of Optison contrast.  Unable to adequately assess for discrete wall motion abnormalities .     6 - Compared to echo from 11/13/2012, no significant change."     Lipid .4, ASCVD 10-year event risk only 1.7%, was placed on atorvastatin 20 mg, no repeat lipid obtained. For the past month noted recurrent right sided CP, like a pressure radiating to the back, last up to half an hour, intensity rated 6/10, no associated symptoms, able to return to sleep. No known inciting factor, don't feel stress. Active with walking daily for half an hour, no problem excepted noted some heart racing. ECG is essentially normal , rate 72, non-specific STA. Compliant with medications, never smoked, very occasional alcohol. Uses Mobic very rarely " for pain in the shoulder.     In 5/2019, here for annual review and medication refill. No heart worries, noted some SOB when making the bed. Gym exercise no problem. Labs indicates eosinophilia suspect some form allergies. Do report occasional wheezing and cough.    In 6/2021 return for need of Rx. Continue with some concern of LARES but now able to climb 2 flights of stairs. Back working full time with problem. Some palpitations at night while watching TV resolve by falling asleep. Scary.     In 7/2021, return for Echo results.  Echo - The left ventricle is normal in size with concentric remodeling and mildly decreased systolic function.  The estimated ejection fraction is 45%.  Grade I left ventricular diastolic dysfunction.  Normal right ventricular size.  Mild mitral regurgitation.  The left ventricular global longitudinal strain is -13%. Reduced.  There is left ventricular global hypokinesis.  Suggest further deterioration of LVEF from Echo in 1/2017    In 8/2021, no significant change, able to tolerate low dose ARB and iron supplement. Been on BB for several years, noted some fatigue starting last year.     In 10/2022, return for annual follow up and medication renewal. Doing well, just mild LARES, improved. Placed on supplement for KERMIT. No recent labs. No heart worries.    HPI Comments: in 1/2024,l return for review. Still concern of LARES with stair climbing. Adriana review shows low iron saturation 2 years ago and improved post supplement, off iron for some time and now recurrent anemia on CBC. Discussed BB but had problem with tachycardia with use of half dose. No neurologic issues except for migraines, helped by BB.     ROS     Constitutional: negative for chills, have little fatigue, no fevers, sweats, weight stable since 7/2020  HENT: no sinus congestion, no further hoarseness or sore throat.  Eyes: negative for icterus, redness and visual disturbance  Respiratory: negative for asthma, cough, still with mild  "dyspnea on exertion, no hemoptysis, pleurisy/chest pain and wheezing, [place] score 2, today a 3, snoring noted but awaken refreshed.  Cardiovascular: no chest pain, no chest pressure/discomfort, dyspnea, near-syncope, positive for night time palpitations but improved and LARES, no paroxysmal nocturnal dyspnea and syncope  Gastrointestinal: no abdominal pain, heart burn, no nausea and vomiting  Musculoskeletal: negative for arthralgias, muscle weakness and myalgias, some back pain with bending, keep moving  Neurological: negative for coordination problems, gait problems, tremors, less dizziness and vertigo, rare headaches / migraine with nausea, and paresthesia, occasional migraine HA, can last up to 2 days. Some difficulties in concentration and coordination, no fall but do lose balance easily.  Psych: no depression or anxiety, no further memory loss, some anxiety    Objective:    Physical Exam     General appearance: alert, appears stated age, cooperative and no distress, RA O2 sat 100%  Head: Normocephalic, without obvious abnormality, atraumatic  Eyes:  conjunctivae/corneas clear. PERRL, EOM's intact.   Neck: no adenopathy, no carotid bruit, no JVD, supple, symmetrical, trachea midline and thyroid not enlarged, symmetric, no tenderness/mass/nodules  Lungs:  clear to auscultation bilaterally  Chest wall: no further tenderness  Heart: bradycardic rate and rhythm, S1, S2 normal, no murmur, click, rub or gallop   Abdomen: soft, non-tender; bowel sounds normal; no masses,  no organomegaly, waist circumference 35.25" increased to 36.5", hip 41"  Extremities: extremities normal, atraumatic, no cyanosis or edema  Pulses: 2+ and symmetric    Assessment:       1. LARES (dyspnea on exertion), onset 1/2019    2. ASD (atrial septal defect), closed 12/1996    3. Cardiovascular risk factor, ASCVD 10-year risk 0.7%, 2012, 1.7% in 2017, 2.2% in 2018    4. Right sided weakness (rule out new CVA), 1/2017    5. Status post placement of " implantable loop recorder, [employer], 1/2023    6. Iron deficiency    7. Nonspecific abnormal electrocardiogram (ECG)    8. Hypercholesterolemia, baseline .6    9. LV dysfunction         Plan:       Renée MORENO was seen today for follow-up.    Diagnoses and associated orders for this visit:    LARES (dyspnea on exertion), onset 1/2019  -     Stress Echo Which stress agent will be used? Treadmill Exercise; Color Flow Doppler? Yes; Future    ASD (atrial septal defect), closed 12/1996    Cardiovascular risk factor, ASCVD 10-year risk 0.7%, 2012, 1.7% in 2017, 2.2% in 2018  -     IN OFFICE EKG 12-LEAD (to Muse)  -     Stress Echo Which stress agent will be used? Treadmill Exercise; Color Flow Doppler? Yes; Future    Right sided weakness (rule out new CVA), 1/2017    Status post placement of implantable loop recorder, [employer], 1/2023    Iron deficiency    Nonspecific abnormal electrocardiogram (ECG)  -     Stress Echo Which stress agent will be used? Treadmill Exercise; Color Flow Doppler? Yes; Future    Hypercholesterolemia, baseline .6    LV dysfunction  -     Stress Echo Which stress agent will be used? Treadmill Exercise; Color Flow Doppler? Yes; Future      - All medical issues reviewed, continue current Rx.   - Try using the inhaler prior to task that would cause SOB.  - CV status and all medications reviewed, patient acknowledge good understanding.  - Recommend healthy living: moderate alcohol, healthy diet and regular exercise aiming for fitness, restorative sleep and weight control  - Discussed healthy alcohol daily limit of 0.5 oz of pure alcohol in any 24 hours (roughly one 12-oz beers, 5 oz of wine (8%-12% alcohol), or 0.75 oz (half a shot) of liquor (80 proof)), can not save up.  - Highly recommend 30-60 minutes of exercise daily, can have Sunday off, with 2-3 sessions of muscle strengthening weekly. A  would be very helpful.  - Have to do some abdominal exercise to  rid belly fat  - Consider HIIT, at least 10 minutes daily.  - Instruction for Mediterranean, high potassium diet and heart healthy exercise given.  - Weigh twice weekly, try to lose 1-2 lbs per week, target loss 5%, to 10%  - Need good exercise program, 4 key elements: 1. Aerobic (walking, swimming, dancing, jogging, biking, etc, 2. Muscle strengthening / resistance exercise, need to do 2-3 times weekly, 3. Stretching daily, good stretch takes a whole  total minute. 4. Balance exercise daily.  - Recommend at least annual cardiovascular evaluation in view of patient's significant risk factors. Patient's preference.  - Phone review / encourage use of [facility]    Patient Active Problem List   Diagnosis    ASD (atrial septal defect), closed 12/1996    Nonspecific abnormal electrocardiogram (ECG)    KIRA (generalized anxiety disorder)    Abnormal CT of the head    Perennial sinusitis    Migraine    BMI 29.3 - 29.9, today 30.5    Abdominal obesity    Cardiovascular risk factor, ASCVD 10-year risk 0.7%, 2012, 1.7% in 2017, 2.2% in 2018    Right sided weakness (rule out new CVA), 1/2017    Hypercholesterolemia, baseline .6    LARES (dyspnea on exertion), onset 1/2019    Eosinophilia    Acute bronchitis    Mediastinal adenopathy    Thrombocytopenia    Severe persistent asthma without complication    Bilateral wheezing    Anemia    Bradycardia, drug induced    Palpitations    Iron deficiency    Chronic fatigue, onset 2020    Elevated hemoglobin A1c    Hearing loss of left ear    Sleep apnea    Status post placement of implantable loop recorder, [employer], 1/2023    LV dysfunction     Total time spend including review of record prior to face-to-face visit is 40 minutes. Greater than 50% of the time was spent in counseling and coordination of care. The above assessment and plan have been discussed at length. Referring provider's note reviewed. Labs and procedure over the last 6 months reviewed. Problem List  updated. Asked to bring in all active medications / pills bottles with next visit. Will send note to referring / PCP.

## 2024-01-04 ENCOUNTER — PATIENT MESSAGE (OUTPATIENT)
Dept: NEUROSURGERY | Facility: CLINIC | Age: 59
End: 2024-01-04
Payer: COMMERCIAL

## 2024-01-04 DIAGNOSIS — D32.9 MENINGIOMA: Primary | ICD-10-CM

## 2024-01-23 ENCOUNTER — HOSPITAL ENCOUNTER (EMERGENCY)
Facility: HOSPITAL | Age: 59
Discharge: HOME | End: 2024-01-23
Attending: EMERGENCY MEDICINE
Payer: COMMERCIAL

## 2024-01-23 VITALS
RESPIRATION RATE: 18 BRPM | OXYGEN SATURATION: 100 % | HEART RATE: 85 BPM | SYSTOLIC BLOOD PRESSURE: 123 MMHG | WEIGHT: 190 LBS | DIASTOLIC BLOOD PRESSURE: 67 MMHG | TEMPERATURE: 98 F | BODY MASS INDEX: 32.44 KG/M2 | HEIGHT: 64 IN

## 2024-01-23 DIAGNOSIS — S61.210A LACERATION OF RIGHT INDEX FINGER WITHOUT FOREIGN BODY WITHOUT DAMAGE TO NAIL, INITIAL ENCOUNTER: Primary | ICD-10-CM

## 2024-01-23 PROCEDURE — 90471 IMMUNIZATION ADMIN: CPT | Performed by: EMERGENCY MEDICINE

## 2024-01-23 PROCEDURE — 99284 EMERGENCY DEPT VISIT MOD MDM: CPT | Mod: 25

## 2024-01-23 PROCEDURE — 25000003 PHARM REV CODE 250: Performed by: EMERGENCY MEDICINE

## 2024-01-23 PROCEDURE — 63600175 PHARM REV CODE 636 W HCPCS: Performed by: EMERGENCY MEDICINE

## 2024-01-23 PROCEDURE — 90715 TDAP VACCINE 7 YRS/> IM: CPT | Performed by: EMERGENCY MEDICINE

## 2024-01-23 PROCEDURE — 12001 RPR S/N/AX/GEN/TRNK 2.5CM/<: CPT

## 2024-01-23 RX ORDER — LIDOCAINE HYDROCHLORIDE 10 MG/ML
10 INJECTION INFILTRATION; PERINEURAL
Status: COMPLETED | OUTPATIENT
Start: 2024-01-23 | End: 2024-01-23

## 2024-01-23 RX ADMIN — LIDOCAINE HYDROCHLORIDE 10 ML: 10 INJECTION, SOLUTION INFILTRATION; PERINEURAL at 09:01

## 2024-01-23 RX ADMIN — TETANUS TOXOID, REDUCED DIPHTHERIA TOXOID AND ACELLULAR PERTUSSIS VACCINE, ADSORBED 0.5 ML: 5; 2.5; 8; 8; 2.5 SUSPENSION INTRAMUSCULAR at 09:01

## 2024-01-24 ENCOUNTER — TELEPHONE (OUTPATIENT)
Dept: INTERNAL MEDICINE | Facility: CLINIC | Age: 59
End: 2024-01-24
Payer: COMMERCIAL

## 2024-01-24 NOTE — ED NOTES
Bed: OF 02  Expected date:   Expected time:   Means of arrival: Personal Transportation  Comments:

## 2024-01-24 NOTE — ED PROVIDER NOTES
"Encounter Date: 2024       History     Chief Complaint   Patient presents with    Laceration to R hand     Small lac to R hand, index finger. Pt washing dishes and glass  cutting finger. Pt reports being on "blood thinners". Bleeding under control when entering triage.     58-year-old female on Plavix history mi presents with a laceration to the MCP of the 2nd digit on the right hand after she cut herself when a glass broke as she was washing dishes.  Other complaints.  Does not remember when her last tetanus shot was.    The history is provided by the patient.     Review of patient's allergies indicates:  No Known Allergies  Past Medical History:   Diagnosis Date    MI (myocardial infarction)     Stroke      Past Surgical History:   Procedure Laterality Date     SECTION      CORONARY ARTERY BYPASS GRAFT      INSERTION OF IMPLANTABLE LOOP RECORDER Left 2023    Procedure: Insertion, Implantable Loop Recorder;  Surgeon: Giacomo Chavira MD;  Location: [facility] MINIMALLY-INVASIVE CARDIOLOGY;  Service: Cardiology;  Laterality: Left;     Family History   Problem Relation Age of Onset    Asthma Mother     Cancer Father     Heart attack Maternal Grandfather     Breast cancer Maternal Grandmother      Social History     Tobacco Use    Smoking status: Never    Smokeless tobacco: Never   Substance Use Topics    Alcohol use: No     Alcohol/week: 0.0 standard drinks of alcohol    Drug use: No     Review of Systems   Skin:  Positive for wound.   Hematological:  Bruises/bleeds easily.       Physical Exam     Initial Vitals [24]   BP Pulse Resp Temp SpO2   123/67 85 18 97.7 °F (36.5 °C) 100 %      MAP       --         Physical Exam    Nursing note and vitals reviewed.  Constitutional: She appears well-developed and well-nourished.   HENT:   Head: Normocephalic and atraumatic.   Eyes: EOM are normal.   Neck: Neck supple.   Normal range of motion.  Pulmonary/Chest: No respiratory distress. "   Musculoskeletal:         General: Normal range of motion.      Left hand: Laceration present.        Hands:       Cervical back: Normal range of motion and neck supple.      Comments: Small laceration over the extensor side of the right 2nd MCP     Neurological: She is alert and oriented to person, place, and time.   Skin: Skin is warm and dry.   Psychiatric: She has a normal mood and affect. Her behavior is normal. Judgment and thought content normal.         ED Course   Lac Repair    Date/Time: 1/23/2024 8:44 PM    Performed by: Noah Coffey MD  Authorized by: Noah Coffey MD    Consent:     Consent obtained:  Verbal    Consent given by:  Patient    Risks, benefits, and alternatives were discussed: yes      Risks discussed:  Infection, pain and poor cosmetic result    Alternatives discussed:  No treatment  Universal protocol:     Procedure explained and questions answered to patient or proxy's satisfaction: yes      Patient identity confirmed:  Verbally with patient  Anesthesia:     Anesthesia method:  Local infiltration    Local anesthetic:  Lidocaine 1% w/o epi  Laceration details:     Location:  Finger    Finger location:  R index finger    Length (cm):  0.5  Exploration:     Limited defect created (wound extended): no      Hemostasis achieved with:  Direct pressure    Imaging outcome: foreign body not noted      Wound exploration: wound explored through full range of motion and entire depth of wound visualized      Wound extent: no tendon damage noted      Contaminated: no    Treatment:     Area cleansed with:  Saline    Amount of cleaning:  Standard    Irrigation solution:  Sterile saline    Irrigation volume:  50ml    Irrigation method:  Syringe    Visualized foreign bodies/material removed: no      Debridement:  None    Undermining:  None    Scar revision: no    Skin repair:     Repair method:  Sutures    Suture size:  5-0    Suture material:  Prolene    Suture technique:  Simple interrupted     Number of sutures:  4  Approximation:     Approximation:  Close  Repair type:     Repair type:  Simple  Post-procedure details:     Procedure completion:  Tolerated well, no immediate complications    Labs Reviewed - No data to display       Imaging Results    None          Medications   LIDOcaine HCL 10 mg/ml (1%) injection 10 mL (has no administration in time range)   Tdap (BOOSTRIX) vaccine injection 0.5 mL (has no administration in time range)     Medical Decision Making  58-year-old presents with a small laceration over the MCP of the 2nd right digit on the extensor side.  This is shallow with no evidence of joint violation.  She has no foreign body sensation.  Tetanus was updated, wound easily repaired with sutures.  No indication for any imaging in my opinion.  Suture removal 10 days.    Risk  Prescription drug management.               ED Course as of 01/23/24 2138 Tue Jan 23, 2024 2057 BP: 123/67 [EF]   2057 Temp: 97.7 °F (36.5 °C) [EF]   2057 Temp Source: Oral [EF]   2057 Pulse: 85 [EF]   2057 Resp: 18 [EF]   2057 SpO2: 100 % [EF]      ED Course User Index  [EF] Noah Coffey MD                           Clinical Impression:  Final diagnoses:  [S61.210A] Laceration of right index finger without foreign body without damage to nail, initial encounter (Primary)          ED Disposition Condition    Discharge Stable          ED Prescriptions    None       Follow-up Information       Follow up With Specialties Details Why Contact Info Additional Information    Thomas Khan MD Family Medicine On 2/2/2024 For suture removal [address] [facility] [address]  [facility] [address]  [phone]       [facility] Emergency Medicine  As needed, If symptoms worsen [address] [place] [address] [address] [place] [address] 1st floor             Noah Coffey MD  01/23/24 2138

## 2024-01-24 NOTE — TELEPHONE ENCOUNTER
Attempted to contact MsSunita Denver to schedule ER follow up appt, but no answer.  LVM to call the office.

## 2024-01-24 NOTE — ED NOTES
Pt is a 58 y.o female presenting in the er for a laceration to R hand. Pt has continued bleeding since laceration 2 hrs PTA.

## 2024-01-24 NOTE — TELEPHONE ENCOUNTER
----- Message from Rossy Navarrete sent at 1/24/2024 11:44 AM CST -----   Name of Who is Calling:     What is the request in detail: patient request call back in reference to schedule er follow up / stiches removal from right hand Please contact to further discuss and advise      Can the clinic reply by [facility]:     What Number to Call Back if not in [facility]:   [phone]

## 2024-01-25 ENCOUNTER — TELEPHONE (OUTPATIENT)
Dept: INTERNAL MEDICINE | Facility: CLINIC | Age: 59
End: 2024-01-25
Payer: COMMERCIAL

## 2024-01-25 NOTE — TELEPHONE ENCOUNTER
----- Message from Shaniqua Dumas sent at 1/24/2024  3:16 PM CST -----  Regarding: missed call  Type:  Patient Returning Call    Who Called:pt  Who Left Message for Patient:Suri Leon  Does the patient know what this is regarding?:no  Would the patient rather a call back or a response via [facility]? call  Best Call Back Number:[phone]   Additional Information:

## 2024-01-25 NOTE — TELEPHONE ENCOUNTER
----- Message from Rossy Navarrete sent at 1/24/2024 11:44 AM CST -----   Name of Who is Calling:     What is the request in detail: patient request call back in reference to schedule er follow up / stiches removal from right hand Please contact to further discuss and advise      Can the clinic reply by [facility]:     What Number to Call Back if not in [facility]:   [phone]

## 2024-01-25 NOTE — TELEPHONE ENCOUNTER
Spoke with pt and scheduled the following below:    Appointment Information   Name: DENTON REYES MRN: [id number]   Date: 2/5/2024 Status: [facility]   Appt Time: 2:00 PM Length: 30   Visit Type: HOSPITAL FOLLOW UP [2329] Copay: $30.00   Provider: Thomas Khan MD Dept: [facility], Internal Medicine       Dept Address: [address] [place] [id number]       Dept Phone Number: [phone]   Referring Provider:   CSN: [id number]   Appt Phone:     Notes: hospital f/u; stitches removal   Made On: 1/25/2024 9:22 AM By: JESE ANDREW [[id number]] (ES)

## 2024-01-28 DIAGNOSIS — R00.2 PALPITATIONS: ICD-10-CM

## 2024-01-28 DIAGNOSIS — G43.019 COMMON MIGRAINE WITH INTRACTABLE MIGRAINE: ICD-10-CM

## 2024-01-28 DIAGNOSIS — G43.009 MIGRAINE WITHOUT AURA AND WITHOUT STATUS MIGRAINOSUS, NOT INTRACTABLE: ICD-10-CM

## 2024-01-29 RX ORDER — METOPROLOL TARTRATE 50 MG/1
50 TABLET ORAL 2 TIMES DAILY
Qty: 180 TABLET | Refills: 3 | Status: SHIPPED | OUTPATIENT
Start: 2024-01-29

## 2024-01-29 RX ORDER — ALPRAZOLAM 0.5 MG/1
0.5 TABLET ORAL 2 TIMES DAILY
Qty: 60 TABLET | Refills: 0 | Status: SHIPPED | OUTPATIENT
Start: 2024-01-29 | End: 2024-03-26

## 2024-01-29 RX ORDER — AMITRIPTYLINE HYDROCHLORIDE 10 MG/1
40 TABLET, FILM COATED ORAL NIGHTLY
Qty: 360 TABLET | Refills: 1 | OUTPATIENT
Start: 2024-01-29

## 2024-01-29 NOTE — TELEPHONE ENCOUNTER
Refill Encounter    PCP Visits: Recent Visits  Date Type Provider Dept   04/10/23 Office Visit Thomas Khan MD [facility] Internal Medicine   03/09/23 Office Visit Thomas Khan MD [facility] Internal Medicine   Showing recent visits within past 360 days and meeting all other requirements  Future Appointments  Date Type Provider Dept   02/05/24 Appointment Thomas Khan MD [facility] Internal Medicine   Showing future appointments within next 720 days and meeting all other requirements     Last 3 Blood Pressure:   BP Readings from Last 3 Encounters:   01/23/24 123/67   01/03/24 129/62   04/10/23 110/70     Preferred Pharmacy:   [facility]/pharmacy #[id number] - [place] - [address]  [address] [place] [address]  Phone: [phone] Fax: [phone]    Requested RX:  Requested Prescriptions     Pending Prescriptions Disp Refills    ALPRAZolam (XANAX) 0.5 MG tablet [Pharmacy Med Name: ALPRAZOLAM 0.5 MG TABLET] 60 tablet 0     Sig: TAKE 1 TABLET BY MOUTH TWICE A DAY      RX Route: Normal

## 2024-01-29 NOTE — TELEPHONE ENCOUNTER
No care due was identified.  [facility] Embedded Care Due Messages. Reference number: [id number].   1/28/2024 6:24:38 PM CST

## 2024-03-17 DIAGNOSIS — G43.019 COMMON MIGRAINE WITH INTRACTABLE MIGRAINE: ICD-10-CM

## 2024-03-18 RX ORDER — AMITRIPTYLINE HYDROCHLORIDE 10 MG/1
40 TABLET, FILM COATED ORAL NIGHTLY
Qty: 360 TABLET | Refills: 1 | Status: SHIPPED | OUTPATIENT
Start: 2024-03-18

## 2024-03-25 DIAGNOSIS — G43.009 MIGRAINE WITHOUT AURA AND WITHOUT STATUS MIGRAINOSUS, NOT INTRACTABLE: ICD-10-CM

## 2024-03-26 RX ORDER — ALPRAZOLAM 0.5 MG/1
0.5 TABLET ORAL 2 TIMES DAILY
Qty: 60 TABLET | Refills: 0 | Status: SHIPPED | OUTPATIENT
Start: 2024-03-26 | End: 2024-06-05

## 2024-03-26 NOTE — TELEPHONE ENCOUNTER
No care due was identified.  [facility] Embedded Care Due Messages. Reference number: [id number].   3/25/2024 7:17:06 PM CDT

## 2024-04-11 NOTE — ASSESSMENT & PLAN NOTE
No focal neurological deficits   No obvious cranial nerve deific its that raises suspicion for brainstem stroke  Possible stroke mimic  Consider MRI brain wwo contrast with IAC protocol   11-Apr-2024

## 2024-05-21 DIAGNOSIS — J20.9 ACUTE BRONCHITIS, UNSPECIFIED ORGANISM: ICD-10-CM

## 2024-05-22 RX ORDER — FLUTICASONE PROPIONATE 50 MCG
SPRAY, SUSPENSION (ML) NASAL
Qty: 48 ML | Refills: 1 | Status: SHIPPED | OUTPATIENT
Start: 2024-05-22

## 2024-05-22 NOTE — TELEPHONE ENCOUNTER
Care Due:                  Date            Visit Type   Department     Provider  --------------------------------------------------------------------------------                                EP -                              PRIMARY      [facility] INTERNAL  Thomas Rodriguez  Last Visit: 04-      [facility]   [facility]  Next Visit: None Scheduled  None         None Found                                                            Last  Test          Frequency    Reason                     Performed    Due Date  --------------------------------------------------------------------------------    Office Visit  15 months..  montelukast..............  04-   07-    [facility] Embedded Care Due Messages. Reference number: [id number].   5/21/2024 7:24:32 PM CDT

## 2024-06-05 DIAGNOSIS — G43.009 MIGRAINE WITHOUT AURA AND WITHOUT STATUS MIGRAINOSUS, NOT INTRACTABLE: ICD-10-CM

## 2024-06-05 RX ORDER — ALPRAZOLAM 0.5 MG/1
0.5 TABLET ORAL 2 TIMES DAILY
Qty: 60 TABLET | Refills: 0 | Status: CANCELLED | OUTPATIENT
Start: 2024-06-05

## 2024-06-05 RX ORDER — PANTOPRAZOLE SODIUM 40 MG/1
TABLET, DELAYED RELEASE ORAL
Qty: 90 TABLET | Refills: 3 | Status: SHIPPED | OUTPATIENT
Start: 2024-06-05

## 2024-06-05 RX ORDER — ALPRAZOLAM 0.5 MG/1
0.5 TABLET ORAL 2 TIMES DAILY
Qty: 60 TABLET | Refills: 0 | Status: SHIPPED | OUTPATIENT
Start: 2024-06-05

## 2024-06-05 NOTE — TELEPHONE ENCOUNTER
No care due was identified.  [facility] Embedded Care Due Messages. Reference number: [id number].   6/05/2024 9:18:22 AM CDT

## 2024-06-05 NOTE — TELEPHONE ENCOUNTER
----- Message from Shaniqua Dumas sent at 6/5/2024 11:56 AM CDT -----  Regarding: refill  Type: RX Refill Request     Who Called:pt      RX Name and Strength: ALPRAZolam (XANAX) 0.5 MG tablet      Preferred Pharmacy with phone number:[facility]/pharmacy #[id number] - [place] - [address]   Phone: [phone]     Would the patient rather a call back or a response via [facility]?call     Best Call Back Number: [phone]      Additional Information: pt states she is out of medication

## 2024-06-05 NOTE — TELEPHONE ENCOUNTER
No care due was identified.  [facility] Embedded Care Due Messages. Reference number: [id number].   6/05/2024 9:08:54 AM CDT

## 2024-06-05 NOTE — TELEPHONE ENCOUNTER
----- Message from Shaniqua Dumas sent at 6/5/2024 11:56 AM CDT -----  Regarding: refill  Type: RX Refill Request     Who Called:pt      RX Name and Strength: ALPRAZolam (XANAX) 0.5 MG tablet      Preferred Pharmacy with phone number:[facility]/pharmacy #[id number] - [place] - [address] [facility]   Phone: [phone]     Would the patient rather a call back or a response via [facility]?call     Best Call Back Number: [phone]      Additional Information: pt states she is out of medication

## 2024-07-12 RX ORDER — PROMETHAZINE HYDROCHLORIDE AND DEXTROMETHORPHAN HYDROBROMIDE 6.25; 15 MG/5ML; MG/5ML
5 SYRUP ORAL EVERY 4 HOURS PRN
COMMUNITY
Start: 2024-04-28

## 2024-07-15 RX ORDER — PROMETHAZINE HYDROCHLORIDE AND DEXTROMETHORPHAN HYDROBROMIDE 6.25; 15 MG/5ML; MG/5ML
SYRUP ORAL
Qty: 120 ML | Refills: 1 | Status: SHIPPED | OUTPATIENT
Start: 2024-07-15

## 2024-08-04 DIAGNOSIS — G43.009 MIGRAINE WITHOUT AURA AND WITHOUT STATUS MIGRAINOSUS, NOT INTRACTABLE: ICD-10-CM

## 2024-08-05 RX ORDER — ALPRAZOLAM 0.5 MG/1
0.5 TABLET ORAL 2 TIMES DAILY
Qty: 60 TABLET | Refills: 0 | Status: SHIPPED | OUTPATIENT
Start: 2024-08-05

## 2024-08-29 ENCOUNTER — TELEPHONE (OUTPATIENT)
Dept: INTERNAL MEDICINE | Facility: CLINIC | Age: 59
End: 2024-08-29
Payer: COMMERCIAL

## 2024-08-29 NOTE — TELEPHONE ENCOUNTER
----- Message from Velma Tobias sent at 8/29/2024 12:44 PM CDT -----  Regarding: Returning call  Type: Patient Call Back    Who called:    What is the request in detail: p/t states Michelle called her, do not see anything in chart. Please call to assist.     Can the clinic reply by [facility]?    Would the patient rather a call back or a response via [facility]?     Best call back number: [phone] (home)       Additional Information:

## 2024-08-29 NOTE — TELEPHONE ENCOUNTER
Patient has been contacted in regards to  Cosme Goff for annual appointment. Appointment has to be sent to overbook for scheduling. Appointment requested for  is same day 9/30/2024 at 1:15pm. Will send to overbook for scheduling and inform MD.

## 2024-09-15 DIAGNOSIS — G43.019 COMMON MIGRAINE WITH INTRACTABLE MIGRAINE: ICD-10-CM

## 2024-09-16 RX ORDER — AMITRIPTYLINE HYDROCHLORIDE 10 MG/1
40 TABLET, FILM COATED ORAL NIGHTLY
Qty: 360 TABLET | Refills: 1 | Status: SHIPPED | OUTPATIENT
Start: 2024-09-16

## 2024-10-03 ENCOUNTER — HOSPITAL ENCOUNTER (OUTPATIENT)
Dept: RADIOLOGY | Facility: HOSPITAL | Age: 59
Discharge: HOME | End: 2024-10-03
Attending: FAMILY MEDICINE
Payer: COMMERCIAL

## 2024-10-03 ENCOUNTER — PATIENT OUTREACH (OUTPATIENT)
Dept: ADMINISTRATIVE | Facility: HOSPITAL | Age: 59
End: 2024-10-03
Payer: COMMERCIAL

## 2024-10-03 ENCOUNTER — OFFICE VISIT (OUTPATIENT)
Dept: INTERNAL MEDICINE | Facility: CLINIC | Age: 59
End: 2024-10-03
Attending: FAMILY MEDICINE
Payer: COMMERCIAL

## 2024-10-03 ENCOUNTER — LAB VISIT (OUTPATIENT)
Dept: LAB | Facility: OTHER | Age: 59
End: 2024-10-03
Attending: FAMILY MEDICINE
Payer: COMMERCIAL

## 2024-10-03 VITALS
OXYGEN SATURATION: 100 % | DIASTOLIC BLOOD PRESSURE: 66 MMHG | HEIGHT: 64 IN | HEART RATE: 67 BPM | BODY MASS INDEX: 29.35 KG/M2 | SYSTOLIC BLOOD PRESSURE: 104 MMHG | WEIGHT: 171.94 LBS

## 2024-10-03 VITALS — BODY MASS INDEX: 32.44 KG/M2 | HEIGHT: 64 IN | WEIGHT: 190 LBS

## 2024-10-03 DIAGNOSIS — J45.50 SEVERE PERSISTENT ASTHMA WITHOUT COMPLICATION: ICD-10-CM

## 2024-10-03 DIAGNOSIS — F41.9 ANXIETY: ICD-10-CM

## 2024-10-03 DIAGNOSIS — E78.00 HYPERCHOLESTEROLEMIA: ICD-10-CM

## 2024-10-03 DIAGNOSIS — Z00.00 PREVENTATIVE HEALTH CARE: ICD-10-CM

## 2024-10-03 DIAGNOSIS — Z12.12 SCREENING FOR COLORECTAL CANCER: Primary | ICD-10-CM

## 2024-10-03 DIAGNOSIS — Z12.11 SCREENING FOR COLORECTAL CANCER: Primary | ICD-10-CM

## 2024-10-03 DIAGNOSIS — Z12.31 VISIT FOR SCREENING MAMMOGRAM: ICD-10-CM

## 2024-10-03 DIAGNOSIS — Z00.00 PREVENTATIVE HEALTH CARE: Primary | ICD-10-CM

## 2024-10-03 DIAGNOSIS — D32.0 CEREBRAL CONVEXITY MENINGIOMA: ICD-10-CM

## 2024-10-03 DIAGNOSIS — Z12.4 CERVICAL CANCER SCREENING: ICD-10-CM

## 2024-10-03 DIAGNOSIS — G43.009 MIGRAINE WITHOUT AURA AND WITHOUT STATUS MIGRAINOSUS, NOT INTRACTABLE: ICD-10-CM

## 2024-10-03 PROBLEM — D69.6 THROMBOCYTOPENIA: Status: RESOLVED | Noted: 2019-05-22 | Resolved: 2024-10-03

## 2024-10-03 LAB
ALBUMIN SERPL BCP-MCNC: 4 G/DL (ref 3.5–5.2)
ALP SERPL-CCNC: 125 U/L (ref 55–135)
ALT SERPL W/O P-5'-P-CCNC: 21 U/L (ref 10–44)
ANION GAP SERPL CALC-SCNC: 10 MMOL/L (ref 8–16)
AST SERPL-CCNC: 26 U/L (ref 10–40)
BILIRUB SERPL-MCNC: 0.5 MG/DL (ref 0.1–1)
BUN SERPL-MCNC: 6 MG/DL (ref 6–20)
CALCIUM SERPL-MCNC: 9.7 MG/DL (ref 8.7–10.5)
CHLORIDE SERPL-SCNC: 108 MMOL/L (ref 95–110)
CHOLEST SERPL-MCNC: 124 MG/DL (ref 120–199)
CHOLEST/HDLC SERPL: 2.4 {RATIO} (ref 2–5)
CO2 SERPL-SCNC: 25 MMOL/L (ref 23–29)
CREAT SERPL-MCNC: 0.9 MG/DL (ref 0.5–1.4)
EST. GFR  (NO RACE VARIABLE): >60 ML/MIN/1.73 M^2
ESTIMATED AVG GLUCOSE: 105 MG/DL (ref 68–131)
GLUCOSE SERPL-MCNC: 79 MG/DL (ref 70–110)
HBA1C MFR BLD: 5.3 % (ref 4–5.6)
HDLC SERPL-MCNC: 51 MG/DL (ref 40–75)
HDLC SERPL: 41.1 % (ref 20–50)
LDLC SERPL CALC-MCNC: 59.6 MG/DL (ref 63–159)
NONHDLC SERPL-MCNC: 73 MG/DL
POTASSIUM SERPL-SCNC: 3.7 MMOL/L (ref 3.5–5.1)
PROT SERPL-MCNC: 7 G/DL (ref 6–8.4)
SODIUM SERPL-SCNC: 143 MMOL/L (ref 136–145)
TRIGL SERPL-MCNC: 67 MG/DL (ref 30–150)
TSH SERPL DL<=0.005 MIU/L-ACNC: 1.56 UIU/ML (ref 0.4–4)

## 2024-10-03 PROCEDURE — 80053 COMPREHEN METABOLIC PANEL: CPT | Performed by: FAMILY MEDICINE

## 2024-10-03 PROCEDURE — 3074F SYST BP LT 130 MM HG: CPT | Mod: CPTII,S$GLB,, | Performed by: FAMILY MEDICINE

## 2024-10-03 PROCEDURE — 77067 SCR MAMMO BI INCL CAD: CPT | Mod: 26,,, | Performed by: RADIOLOGY

## 2024-10-03 PROCEDURE — 77067 SCR MAMMO BI INCL CAD: CPT | Mod: TC,PO

## 2024-10-03 PROCEDURE — 1159F MED LIST DOCD IN RCRD: CPT | Mod: CPTII,S$GLB,, | Performed by: FAMILY MEDICINE

## 2024-10-03 PROCEDURE — 83036 HEMOGLOBIN GLYCOSYLATED A1C: CPT | Performed by: FAMILY MEDICINE

## 2024-10-03 PROCEDURE — 84443 ASSAY THYROID STIM HORMONE: CPT | Performed by: FAMILY MEDICINE

## 2024-10-03 PROCEDURE — 80061 LIPID PANEL: CPT | Performed by: FAMILY MEDICINE

## 2024-10-03 PROCEDURE — 99396 PREV VISIT EST AGE 40-64: CPT | Mod: S$GLB,,, | Performed by: FAMILY MEDICINE

## 2024-10-03 PROCEDURE — 99999 PR PBB SHADOW E&M-EST. PATIENT-LVL V: CPT | Mod: PBBFAC,,, | Performed by: FAMILY MEDICINE

## 2024-10-03 PROCEDURE — 77063 BREAST TOMOSYNTHESIS BI: CPT | Mod: 26,,, | Performed by: RADIOLOGY

## 2024-10-03 PROCEDURE — 1160F RVW MEDS BY RX/DR IN RCRD: CPT | Mod: CPTII,S$GLB,, | Performed by: FAMILY MEDICINE

## 2024-10-03 PROCEDURE — 36415 COLL VENOUS BLD VENIPUNCTURE: CPT | Performed by: FAMILY MEDICINE

## 2024-10-03 PROCEDURE — 3008F BODY MASS INDEX DOCD: CPT | Mod: CPTII,S$GLB,, | Performed by: FAMILY MEDICINE

## 2024-10-03 PROCEDURE — 3078F DIAST BP <80 MM HG: CPT | Mod: CPTII,S$GLB,, | Performed by: FAMILY MEDICINE

## 2024-10-03 RX ORDER — ATORVASTATIN CALCIUM 40 MG/1
40 TABLET, FILM COATED ORAL DAILY
Qty: 90 TABLET | Refills: 3 | Status: SHIPPED | OUTPATIENT
Start: 2024-10-03

## 2024-10-03 RX ORDER — ALPRAZOLAM 0.5 MG/1
0.5 TABLET ORAL 2 TIMES DAILY
Qty: 60 TABLET | Refills: 0 | Status: SHIPPED | OUTPATIENT
Start: 2024-10-03

## 2024-10-03 NOTE — PROGRESS NOTES
"CHIEF COMPLAINT:  Annual    HISTORY OF PRESENT ILLNESS: The patient presents with annual.     she is due for her colonoscopy in a couple of years.    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    REVIEW OF SYSTEMS:  GENERAL: No fever, chills, fatigability or weight loss.  SKIN: No rashes, itching or changes in color or texture of skin.  HEAD: No headaches or recent head trauma.  EYES: Visual acuity fine. No photophobia, ocular pain or diplopia.  EARS: Denies ear pain, discharge or vertigo.  NOSE: No loss of smell, no epistaxis or postnasal drip.  MOUTH & THROAT: No hoarseness or change in voice. No excessive gum bleeding.  NODES: Denies swollen glands.  CHEST: Denies LARES, cyanosis, wheezing, cough and sputum production.  CARDIOVASCULAR: Denies chest pain, PND, orthopnea or reduced exercise tolerance.  ABDOMEN: Appetite fine. No weight loss. Denies diarrhea, hematemesis or blood in stool.  URINARY: No hematuria.  PERIPHERAL VASCULAR: No claudication or cyanosis.  MUSCULOSKELETAL: No joint stiffness or swelling.   NEUROLOGIC: No history of seizures, paralysis, alteration of gait or coordination.    PHYSICAL EXAMINATION:     /66 (BP Location: Left arm, Patient Position: Sitting)   Pulse 67   Ht 5' 4" (1.626 m)   Wt 78 kg (171 lb 15.3 oz)   LMP 10/12/2012   SpO2 100%   BMI 29.52 kg/m²     APPEARANCE: Well nourished, well developed, in no acute distress.    HEAD: Normocephalic, atraumatic.  EYES: PERRL. EOMI.  Conjunctivae without injection and  anicteric  NOSE: Mucosa pink. Airway clear.  MOUTH & THROAT: No tonsillar enlargement. No pharyngeal erythema or exudate. No stridor.  NECK: Supple.   NODES: No cervical, axillary or inguinal lymph node enlargement.  ABDOMEN: Bowel sounds normal. Not distended. Soft. No tenderness or masses.  No ascites is noted.  MUSCULOSKELETAL:  There is no clubbing, cyanosis, or edema of the extremities x4.  There is full " range of motion of the lumbar spine.  There is full range of motion of the extremities x4.  There is no deformity noted.  There is significant spasm involving the lumbar region bilaterally.  NEUROLOGIC:       Normal speech development.      Hearing normal.      Antalgic gait.      Motor and sensory exams grossly normal.  PSYCHIATRIC: Patient is alert and oriented x3.  Thought processes are all normal.  There is no homicidality.  There is no suicidality.  There is no evidence of psychosis.    LABORATORY/RADIOLOGY: Chart reviewed.    ASSESSMENT:   Annual  GERD with esophagitis  Anxiety    PLAN:  We will follow-up blood work which we expect to be normal.    Xanax refkiersten  Return to clinic later this year

## 2024-11-13 ENCOUNTER — TELEPHONE (OUTPATIENT)
Dept: INTERNAL MEDICINE | Facility: CLINIC | Age: 59
End: 2024-11-13
Payer: COMMERCIAL

## 2024-11-13 NOTE — TELEPHONE ENCOUNTER
----- Message from Brit sent at 11/13/2024 11:26 AM CST -----  Regarding: medication  Name of caller:katie       What is the requesting detail: Pt is not feeling well. Requesting medication for itchy ears, throat and cough.Please give her a call back to discuss.       Can the clinic reply by [facility]:       What number to call back: [phone]

## 2024-11-13 NOTE — TELEPHONE ENCOUNTER
Spoke wit the pt who stated she started yesterday with a sore throat, itchy ears and cough. Scheduled the pt for a virtual visit tomorrow to be evaluated. Pt declined an in-person appt.

## 2024-11-14 ENCOUNTER — OFFICE VISIT (OUTPATIENT)
Dept: INTERNAL MEDICINE | Facility: CLINIC | Age: 59
End: 2024-11-14
Payer: COMMERCIAL

## 2024-11-14 DIAGNOSIS — J06.9 URI WITH COUGH AND CONGESTION: Primary | ICD-10-CM

## 2024-11-14 PROCEDURE — 3044F HG A1C LEVEL LT 7.0%: CPT | Mod: CPTII,95,,

## 2024-11-14 PROCEDURE — 99213 OFFICE O/P EST LOW 20 MIN: CPT | Mod: 95,,,

## 2024-11-14 PROCEDURE — 1159F MED LIST DOCD IN RCRD: CPT | Mod: CPTII,95,,

## 2024-11-14 RX ORDER — PROMETHAZINE HYDROCHLORIDE AND DEXTROMETHORPHAN HYDROBROMIDE 6.25; 15 MG/5ML; MG/5ML
5 SYRUP ORAL EVERY 8 HOURS PRN
Qty: 118 ML | Refills: 0 | Status: SHIPPED | OUTPATIENT
Start: 2024-11-14 | End: 2024-11-24

## 2024-11-14 RX ORDER — BENZONATATE 200 MG/1
200 CAPSULE ORAL 3 TIMES DAILY PRN
Qty: 21 CAPSULE | Refills: 0 | Status: SHIPPED | OUTPATIENT
Start: 2024-11-14 | End: 2024-11-24

## 2024-11-14 NOTE — PATIENT INSTRUCTIONS
Once daily antihistamine (ex. Claratin, xyzol, zyrtec, allegra) and twice daily azelastine nasal spray to decrease sinus congestion.  Tessalon Perles up to three x day for cough.  Promethazine DM at bedtime for cough and sleep.  Drink plenty of water to keep the mucus thin and mobile.  Return to clinic for symptoms that get worse, increase of cough with wheezing or shortness of breath.  Return to clinic for fever that returns after having gone away for more than 24 hours without fever reducing medication, especially with coarse or wet breath sounds.

## 2024-11-14 NOTE — PROGRESS NOTES
The patient location is: [place]  The chief complaint leading to consultation is: URI    Visit type: audiovisual    Face to Face time with patient: 5 min  10 minutes of total time spent on the encounter, which includes face to face time and non-face to face time preparing to see the patient (eg, review of tests), Obtaining and/or reviewing separately obtained history, Documenting clinical information in the electronic or other health record, Independently interpreting results (not separately reported) and communicating results to the patient/family/caregiver, or Care coordination (not separately reported).         Each patient to whom he or she provides medical services by telemedicine is:  (1) informed of the relationship between the physician and patient and the respective role of any other health care provider with respect to management of the patient; and (2) notified that he or she may decline to receive medical services by telemedicine and may withdraw from such care at any time.    Notes:       CHIEF COMPLAINT     Chief Complaint   Patient presents with    Cough    Sore Throat    Otalgia       HPI     Renée Goff is a 59 y.o. female who presents for xxx today.    PCP is Thomas Khan MD, patient is new to me. This note was generated with the assistance of ambient listening technology. Verbal consent was obtained by the patient and accompanying visitor(s) for the recording of patient appointment to facilitate this note. I attest to having reviewed and edited the generated note for accuracy, though some syntax or spelling errors may persist. Please contact the author of this note for any clarification.     History of Present Illness    CHIEF COMPLAINT:  Patient presents with ear pain, itchiness, sore throat, and cough that started 4 days ago.    HPI:  Patient reports symptom onset 4 days ago, including ear pain, itchiness, sore throat, and cough. She attended a baseball tournament with her grandson  over the weekend and suspects potential illness exposure there. Patient denies having flu or COVID-19.    MEDICATIONS:  Patient is on Albuterol inhaler, to be used as needed for breathing difficulties.      ROS:  General: -fever, -chills, -fatigue, -weight gain, -weight loss  Eyes: -vision changes, -redness, -discharge  ENT: +ear pain, -nasal congestion, +sore throat  Cardiovascular: -chest pain, -palpitations, -lower extremity edema  Respiratory: +cough, -shortness of breath  Gastrointestinal: -abdominal pain, -nausea, -vomiting, -diarrhea, -constipation, -blood in stool  Genitourinary: -dysuria, -hematuria, -frequency  Musculoskeletal: -joint pain, -muscle pain  Skin: -rash, -lesion  Neurological: -headache, -dizziness, -numbness, -tingling  Psychiatric: -anxiety, -depression, -sleep difficulty          Past Medical History:  Past Medical History:   Diagnosis Date    MI (myocardial infarction)     Stroke        Home Medications:  Prior to Admission medications    Medication Sig Start Date End Date Taking? Authorizing Provider   albuterol (PROVENTIL/VENTOLIN HFA) 90 mcg/actuation inhaler 2 puffs every 4 hours as needed for cough, wheeze, or shortness of breath 5/22/19  Yes Sage Soto MD   ALPRAZolam (XANAX) 0.5 MG tablet Take 1 tablet (0.5 mg total) by mouth 2 (two) times daily. 10/3/24  Yes Thomas Khan MD   amitriptyline (ELAVIL) 10 MG tablet TAKE 4 TABLETS (40 MG TOTAL) BY MOUTH EVERY EVENING. 9/16/24  Yes Emma Mackey MD   amitriptyline (ELAVIL) 10 MG tablet Take 4 tablets (40 mg total) by mouth every evening. 9/16/24  Yes Emma Mackey MD   atorvastatin (LIPITOR) 40 MG tablet Take 1 tablet (40 mg total) by mouth once daily. 10/3/24  Yes Thomas Khan MD   butalbitaL-acetaminophen  mg Tab 1 tab(s) orally every 4 hours   Yes Provider, Historical   butalbital-acetaminophen-caffeine -40 mg (FIORICET, ESGIC) -40 mg per tablet Take 1 tablet by mouth  every 4 (four) hours as needed for Pain. 1/9/23  Yes Thomas Khan MD   clopidogreL (PLAVIX) 75 mg tablet Take 1 tablet (75 mg total) by mouth once daily. for 21 days 1/9/23 11/14/24 Yes Matthew Salcedo PA-C   cyclobenzaprine (FLEXERIL) 10 MG tablet TAKE 1 TABLET BY MOUTH THREE TIMES A DAY AS NEEDED FOR MUSCLE SPASMS 12/4/23  Yes Thomas Khan MD   fluticasone propionate (FLONASE) 50 mcg/actuation nasal spray SPRAY 2 SPRAYS BY EACH NOSTRIL ROUTE ONCE DAILY. 5/22/24  Yes Thomas Khan MD   methocarbamoL (ROBAXIN) 500 MG Tab TAKE 1 TABLET BY MOUTH EVERY DAY 8/22/23  Yes Ran Ding MD   metoprolol tartrate (LOPRESSOR) 50 MG tablet TAKE 1 TABLET BY MOUTH TWICE A DAY 1/29/24  Yes Giacomo Chavira MD   montelukast (SINGULAIR) 10 mg tablet Take 1 tablet (10 mg total) by mouth once daily. 12/18/23  Yes Thomas Khan MD   multivit with min-folic acid 0.4 mg Tab 1 tablet.   Yes Provider, Historical   multivitamin (THERAGRAN) per tablet Take 1 tablet by mouth once daily.   Yes Provider, Historical   NURTEC 75 mg odt Take by mouth. 1/1/24  Yes Provider, Historical   pantoprazole (PROTONIX) 40 MG tablet TAKE 1 TABLET BY MOUTH EVERY DAY 6/5/24  Yes Thomas Khan MD   topiramate 25 mg capsule Take 25 mg by mouth 2 (two) times daily. 10/19/23  Yes Provider, Historical   promethazine-dextromethorphan (PROMETHAZINE-DM) 6.25-15 mg/5 mL Syrp TAKE 5 MILLILITERS BY MOUTH EVERY 4 HOURS AS NEEDED 7/15/24 11/14/24 Yes Thomas Khan MD   promethazine-dextromethorphan (PROMETHAZINE-DM) 6.25-15 mg/5 mL Syrp Take 5 mLs by mouth every 4 (four) hours as needed. 4/28/24 11/14/24 Yes Provider, Historical   aspirin (ECOTRIN) 81 MG EC tablet 1 tablet.  Patient not taking: Reported on 11/14/2024    Provider, Historical   benzonatate (TESSALON) 200 MG capsule Take 1 capsule (200 mg total) by mouth 3 (three) times daily as needed for Cough. 11/14/24 11/24/24  Hua Chinchilla  J., NP   estradioL (ESTRACE) 0.01 % (0.1 mg/gram) vaginal cream 0.5 g intravaginally 3 times a week for 90 days  Patient not taking: Reported on 11/14/2024 9/30/22   Provider, Historical   ferrous sulfate (FEOSOL) 325 mg (65 mg iron) Tab tablet 1 TABLET BY MOUTH 2 TIMES DAILY. TRY ON EMPTY STOMACH FIRST  Patient not taking: Reported on 11/14/2024 10/24/22   Giacomo Chavira MD   losartan (COZAAR) 25 MG tablet Take 1 tablet (25 mg total) by mouth every evening.  Patient not taking: Reported on 1/3/2024 10/24/22   Giacomo Chavira MD   promethazine-dextromethorphan (PROMETHAZINE-DM) 6.25-15 mg/5 mL Syrp Take 5 mLs by mouth every 8 (eight) hours as needed (For cough. Do not operate heavy machinery. Will cause drowsiness.). 11/14/24 11/24/24  Hua Chinchilla NP       Review of Systems:  Review of Systems   HENT:  Positive for ear pain and sore throat. Negative for drooling, ear discharge and trouble swallowing.    Respiratory:  Positive for cough. Negative for shortness of breath and stridor.    Gastrointestinal:  Negative for abdominal pain, diarrhea and vomiting.   Musculoskeletal:  Negative for neck pain.   Neurological:  Positive for headaches.       Health Maintainence:   Immunizations:  Health Maintenance         Date Due Completion Date    Cervical Cancer Screening Never done ---    HIV Screening Never done ---    Shingles Vaccine (2 of 2) 11/27/2021 10/2/2021    Colorectal Cancer Screening 03/14/2024 3/14/2019    RSV Vaccine (Age 60+ and Pregnant patients) (1 - Risk 60-74 years 1-dose series) Never done ---    Influenza Vaccine (1) Never done ---    COVID-19 Vaccine (3 - 2024-25 season) 09/01/2024 5/5/2021    High Dose Statin 10/03/2025 10/3/2024    Mammogram 10/03/2025 10/3/2024    Hemoglobin A1c (Diabetic Prevention Screening) 10/03/2027 10/3/2024    Lipid Panel 10/03/2029 10/3/2024    TETANUS VACCINE 01/23/2034 1/23/2024             PHYSICAL EXAM     LMP 10/12/2012     Physical Exam  HENT:      Head:  Normocephalic and atraumatic.   Pulmonary:      Effort: Pulmonary effort is normal.   Neurological:      Mental Status: She is alert and oriented to person, place, and time.   Psychiatric:         Behavior: Behavior normal.           ASSESSMENT/PLAN   Assessment & Plan    Assessed symptoms as likely viral infection, not flu or COVID  Considered patient still potentially contagious  Anticipated possibility of post-viral syndrome with lingering cough and congestion  Prepared for potential need for oral steroids or albuterol inhaler if symptoms persist or worsen  Aware of walking pneumonia cases in the community, but currently not suspected in this patient    ACUTE UPPER RESPIRATORY INFECTION:  Explained current circulating viral illness causing sore throat and cold symptoms.  Discussed possibility of post-viral syndrome and potential for lingering symptoms.  Reviewed possibility of walking pneumonia if symptoms worsen after initial improvement.  Started daily antihistamine (options: Xyzal, Zyrtec, or Claritin) daily for congestion.  Started azelastine nasal spray (antihistamine) for sinus congestion and post-nasal drip.  Recommend Tylenol or ibuprofen as needed for aches and pains.    ACUTE COUGH:  Started Tessalon Pearls (cough suppressant) up to 3 times daily.  Started promethazine DM (cough syrup) at bedtime for nighttime cough relief.  Patient to avoid drinking or driving when taking prescribed cough syrup.    FOLLOW-UP AND MONITORING:  Contact the office if symptoms worsen after initial improvement.  Follow up for chest XR if feeling relapse of symptoms after improvement.  Contact the office if experiencing major symptoms like persistent cough, congestion, shortness of breath, or wheezing.          Renée was seen today for cough, sore throat and otalgia.    Diagnoses and all orders for this visit:    URI with cough and congestion  -     benzonatate (TESSALON) 200 MG capsule; Take 1 capsule (200 mg total) by  mouth 3 (three) times daily as needed for Cough.  -     promethazine-dextromethorphan (PROMETHAZINE-DM) 6.25-15 mg/5 mL Syrp; Take 5 mLs by mouth every 8 (eight) hours as needed (For cough. Do not operate heavy machinery. Will cause drowsiness.).          Hua Chinchilla NP   Department of Internal Medicine - [facility]  7:48 AM  Answers submitted by the patient for this visit:  Sore Throat Questionnaire (Submitted on 11/13/2024)  Chief Complaint: Sore throat  Chronicity: new  Onset: yesterday  Progression since onset: gradually worsening  Pain worse on: right  Fever: no fever  Fever duration: less than 1 day  Pain - numeric: 7/10  hoarse voice: No  plugged ear sensation: No  swollen glands: No  Treatments tried: NSAIDs  Improvement on treatment: no relief  Pain severity: moderate

## 2024-12-09 DIAGNOSIS — J20.9 ACUTE BRONCHITIS, UNSPECIFIED ORGANISM: ICD-10-CM

## 2024-12-09 DIAGNOSIS — G43.009 MIGRAINE WITHOUT AURA AND WITHOUT STATUS MIGRAINOSUS, NOT INTRACTABLE: ICD-10-CM

## 2024-12-09 RX ORDER — FLUTICASONE PROPIONATE 50 MCG
SPRAY, SUSPENSION (ML) NASAL
Qty: 48 ML | Refills: 1 | Status: SHIPPED | OUTPATIENT
Start: 2024-12-09

## 2024-12-09 RX ORDER — ALPRAZOLAM 0.5 MG/1
0.5 TABLET ORAL 2 TIMES DAILY
Qty: 60 TABLET | Refills: 0 | Status: SHIPPED | OUTPATIENT
Start: 2024-12-09

## 2024-12-09 NOTE — TELEPHONE ENCOUNTER
No care due was identified.  [facility] Embedded Care Due Messages. Reference number: [id number].   12/09/2024 9:13:47 AM CST

## 2025-01-30 NOTE — TELEPHONE ENCOUNTER
PATIENT COMPLETED AN EGD-COLONOSCOPY ON 3.14.2019 AT [facility] reviewed - AND UPDATED.    REPORT SCANNED INTO PATIENT CHART CAN BE REVIEWED UNDER MEDIA TAB.         intact

## 2025-02-12 DIAGNOSIS — G43.009 MIGRAINE WITHOUT AURA AND WITHOUT STATUS MIGRAINOSUS, NOT INTRACTABLE: ICD-10-CM

## 2025-02-12 RX ORDER — ALPRAZOLAM 0.5 MG/1
0.5 TABLET ORAL 2 TIMES DAILY
Qty: 60 TABLET | Refills: 0 | Status: SHIPPED | OUTPATIENT
Start: 2025-02-12

## 2025-02-12 NOTE — TELEPHONE ENCOUNTER
Refill Encounter    PCP Visits: Recent Visits  Date Type Provider Dept   10/03/24 Office Visit Thomas Khan MD [facility] Internal Medicine   Showing recent visits within past 360 days and meeting all other requirements  Future Appointments  No visits were found meeting these conditions.  Showing future appointments within next 720 days and meeting all other requirements     Last 3 Blood Pressure:   BP Readings from Last 3 Encounters:   10/03/24 104/66   01/23/24 123/67   01/03/24 129/62     Preferred Pharmacy:   [facility]/pharmacy #[id number] - [place] - [address]  [address] [place] [address]  Phone: [phone] Fax: [phone]    Requested RX:  Requested Prescriptions     Pending Prescriptions Disp Refills    ALPRAZolam (XANAX) 0.5 MG tablet 60 tablet 0     Sig: Take 1 tablet (0.5 mg total) by mouth 2 (two) times daily.      RX Route: Normal

## 2025-02-12 NOTE — TELEPHONE ENCOUNTER
No care due was identified.  Health [facility] Embedded Care Due Messages. Reference number: [id number].   2/12/2025 7:55:42 AM CST

## 2025-02-25 ENCOUNTER — TELEPHONE (OUTPATIENT)
Dept: INTERNAL MEDICINE | Facility: CLINIC | Age: 60
End: 2025-02-25
Payer: COMMERCIAL

## 2025-02-27 ENCOUNTER — TELEPHONE (OUTPATIENT)
Dept: INTERNAL MEDICINE | Facility: CLINIC | Age: 60
End: 2025-02-27
Payer: COMMERCIAL

## 2025-04-20 DIAGNOSIS — G43.009 MIGRAINE WITHOUT AURA AND WITHOUT STATUS MIGRAINOSUS, NOT INTRACTABLE: ICD-10-CM

## 2025-04-20 NOTE — TELEPHONE ENCOUNTER
No care due was identified.  [facility] Embedded Care Due Messages. Reference number: [id number].   4/20/2025 8:34:00 AM CDT

## 2025-04-21 RX ORDER — ALPRAZOLAM 0.5 MG/1
0.5 TABLET ORAL 2 TIMES DAILY
Qty: 60 TABLET | Refills: 0 | Status: SHIPPED | OUTPATIENT
Start: 2025-04-21

## 2025-04-21 NOTE — TELEPHONE ENCOUNTER
Refill Encounter    PCP Visits: Recent Visits  Date Type Provider Dept   11/14/24 Office Visit Hua Chinchilla NP [facility] Internal Medicine   10/03/24 Office Visit Thomas Khan MD [facility] Internal Medicine   Showing recent visits within past 360 days and meeting all other requirements  Future Appointments  No visits were found meeting these conditions.  Showing future appointments within next 720 days and meeting all other requirements      Last 3 Blood Pressure:   BP Readings from Last 3 Encounters:   10/03/24 104/66   01/23/24 123/67   01/03/24 129/62     Preferred Pharmacy:   [facility]/pharmacy #[id number] - [place] - [address]  [address] [place] [address]  Phone: [phone] Fax: [phone]    Requested RX:  Requested Prescriptions     Pending Prescriptions Disp Refills    ALPRAZolam (XANAX) 0.5 MG tablet [Pharmacy Med Name: ALPRAZOLAM 0.5 MG TABLET] 60 tablet 0     Sig: TAKE 1 TABLET BY MOUTH 2 TIMES DAILY.      RX Route: Normal

## 2025-04-22 DIAGNOSIS — J45.50 SEVERE PERSISTENT ASTHMA WITHOUT COMPLICATION: ICD-10-CM

## 2025-04-22 RX ORDER — MONTELUKAST SODIUM 10 MG/1
10 TABLET ORAL
Qty: 90 TABLET | Refills: 1 | Status: SHIPPED | OUTPATIENT
Start: 2025-04-22

## 2025-04-22 NOTE — TELEPHONE ENCOUNTER
No care due was identified.  [facility] Embedded Care Due Messages. Reference number: [id number].   4/22/2025 6:56:22 AM CDT

## 2025-04-22 NOTE — TELEPHONE ENCOUNTER
Refill Decision Note   Renée Denver  is requesting a refill authorization.  Brief Assessment and Rationale for Refill:  Approve     Medication Therapy Plan:         Comments:     Note composed:11:07 AM 04/22/2025

## 2025-05-01 ENCOUNTER — TELEPHONE (OUTPATIENT)
Dept: PHARMACY | Facility: CLINIC | Age: 60
End: 2025-05-01
Payer: COMMERCIAL

## 2025-05-01 NOTE — TELEPHONE ENCOUNTER
[facility]/Population Health Chart Review & Patient Outreach Details For Medication Adherence Project    Reason for Outreach Encounter: 3rd Party payor non-compliance report ([employer], [employer], C, etc)  2.  Patient Outreach Method: Reviewed Patient Chart  3.   Medication in question: atorvastatin   LAST FILLED: 2/20/25 for 90 day supply  Hyperlipidemia Medications              atorvastatin (LIPITOR) 40 MG tablet Take 1 tablet (40 mg total) by mouth once daily.               4.  Reviewed and or Updates Made To: Patient Chart  5. Outreach Outcomes and/or actions taken: Patient filled medication and is on track to be adherent      
Negative

## 2025-05-12 DIAGNOSIS — R00.2 PALPITATIONS: ICD-10-CM

## 2025-05-12 RX ORDER — METOPROLOL TARTRATE 50 MG/1
50 TABLET ORAL 2 TIMES DAILY
Qty: 180 TABLET | Refills: 3 | OUTPATIENT
Start: 2025-05-12

## 2025-05-13 NOTE — TELEPHONE ENCOUNTER
----- Message from Peter sent at 5/13/2025  1:03 PM CDT -----  Regarding: return call  Contact: patient  Type:  Patient Returning CallWho Called:patientWho Left Message for Patient:nurseDoes the patient know what this is regarding?:metoprolol tartrate (LOPRESSOR) 50 MG tabletWould the patient rather a call back or a response via [facility]? [facility]:[phone] Information: [facility]/pharmacy #[id number] - [place] - [address] [place] [address]Frhwk: [phone] Fax: [phone]

## 2025-05-14 DIAGNOSIS — R00.2 PALPITATIONS: ICD-10-CM

## 2025-05-14 RX ORDER — METOPROLOL TARTRATE 50 MG/1
50 TABLET ORAL 2 TIMES DAILY
Qty: 60 TABLET | Refills: 0 | Status: SHIPPED | OUTPATIENT
Start: 2025-05-14 | End: 2025-05-15 | Stop reason: SDUPTHER

## 2025-05-15 ENCOUNTER — PATIENT MESSAGE (OUTPATIENT)
Dept: CARDIOLOGY | Facility: CLINIC | Age: 60
End: 2025-05-15
Payer: COMMERCIAL

## 2025-05-15 DIAGNOSIS — R00.2 PALPITATIONS: ICD-10-CM

## 2025-05-15 RX ORDER — METOPROLOL TARTRATE 50 MG/1
50 TABLET ORAL 2 TIMES DAILY
Qty: 60 TABLET | Refills: 0 | Status: SHIPPED | OUTPATIENT
Start: 2025-05-15

## 2025-05-15 NOTE — TELEPHONE ENCOUNTER
Refill Encounter    PCP Visits: Recent Visits  Date Type Provider Dept   11/14/24 Office Visit Hua Chinchilla NP [facility] Internal Medicine   10/03/24 Office Visit Thomas Khan MD [facility] Internal Medicine   Showing recent visits within past 360 days and meeting all other requirements  Future Appointments  No visits were found meeting these conditions.  Showing future appointments within next 720 days and meeting all other requirements      Last 3 Blood Pressure:   BP Readings from Last 3 Encounters:   10/03/24 104/66   01/23/24 123/67   01/03/24 129/62     Preferred Pharmacy:   [facility]/pharmacy #[id number] - [place] - [address]  [address] [place] [address]  Phone: [phone] Fax: [phone]    Requested RX:  Requested Prescriptions     Pending Prescriptions Disp Refills    metoprolol tartrate (LOPRESSOR) 50 MG tablet 60 tablet 0     Sig: Take 1 tablet (50 mg total) by mouth 2 (two) times daily. Need OFFICE VISIT before next refill, last seen 1/2024. This will be the LAST Rx if visit is not made.      RX Route: Normal

## 2025-05-15 NOTE — TELEPHONE ENCOUNTER
No care due was identified.  [facility] Embedded Care Due Messages. Reference number: [id number].   5/15/2025 8:28:23 AM CDT

## 2025-05-15 NOTE — TELEPHONE ENCOUNTER
----- Message from En[place] sent at 5/14/2025  4:39 PM CDT -----    Name of Who is Calling:DENTON REYES [[id number]]    What is the request in detail: pt is requesting a call back regarding her medication metoprol 50 mg and the status of it. Pt is almost of the medication and 2 days left. Please contact to further discuss and advise.       Can the clinic reply by [facility]: call     What Number to Call Back if not in [facility]:.  [phone]

## 2025-05-16 ENCOUNTER — OFFICE VISIT (OUTPATIENT)
Dept: CARDIOLOGY | Facility: CLINIC | Age: 60
End: 2025-05-16
Payer: COMMERCIAL

## 2025-05-16 VITALS
WEIGHT: 154.81 LBS | BODY MASS INDEX: 26.43 KG/M2 | SYSTOLIC BLOOD PRESSURE: 100 MMHG | OXYGEN SATURATION: 97 % | DIASTOLIC BLOOD PRESSURE: 64 MMHG | HEART RATE: 68 BPM | HEIGHT: 64 IN

## 2025-05-16 DIAGNOSIS — Z91.89 CARDIOVASCULAR RISK FACTOR: Primary | ICD-10-CM

## 2025-05-16 DIAGNOSIS — Z95.818 STATUS POST PLACEMENT OF IMPLANTABLE LOOP RECORDER: ICD-10-CM

## 2025-05-16 DIAGNOSIS — E78.00 HYPERCHOLESTEROLEMIA: ICD-10-CM

## 2025-05-16 DIAGNOSIS — Q21.10 ASD (ATRIAL SEPTAL DEFECT): ICD-10-CM

## 2025-05-16 PROCEDURE — 99999 PR PBB SHADOW E&M-EST. PATIENT-LVL IV: CPT | Mod: PBBFAC,,, | Performed by: INTERNAL MEDICINE

## 2025-05-16 NOTE — LETTER
May 16, 2025        Thomas Khan MD  [address]  [address]  [facility] [address]             [facility] - Cardiology  [address]  [address]  [facility] [place] [address]  Phone: [phone]  Fax: [phone]   Patient: Renée Goff   MR Number: [id number]   YOB: 1965   Date of Visit: 5/16/2025       Dear Dr. Khan:    Thank you for referring Renée Goff to me for evaluation. Below are the relevant portions of my assessment and plan of care.      1. Cardiovascular risk factor, ASCVD 10-year risk 0.7%, 2012, 1.7% in 2017, 2.2% in 2018    2. ASD (atrial septal defect), closed 12/1996    3. Status post placement of implantable loop recorder, [employer], 1/2023    4. Hypercholesterolemia, baseline .6        Renée MORENO was seen today for follow-up.    Diagnoses and associated orders for this visit:    Cardiovascular risk factor, ASCVD 10-year risk 0.7%, 2012, 1.7% in 2017, 2.2% in 2018    ASD (atrial septal defect), closed 12/1996    Status post placement of implantable loop recorder, [employer] [employer], 1/2023    Hypercholesterolemia, baseline .6      - All medical issues reviewed, continue current Rx.   - Try using the inhaler prior to task that would cause SOB.  - CV status and all medications reviewed, patient acknowledge good understanding.  - Recommend healthy living: moderate alcohol, healthy diet and regular exercise aiming for fitness, restorative sleep and weight control  - Discussed healthy alcohol daily limit of 0.5 oz of pure alcohol in any 24 hours (roughly one 12-oz beers, 5 oz of wine (8%-12% alcohol), or 0.75 oz (half a shot) of liquor (80 proof)), can not save up.  - Highly recommend 30-60 minutes of exercise daily, can have Sunday off, with 2-3 sessions of muscle strengthening weekly. A  would be very helpful.  - Have to do some abdominal exercise to rid belly fat  - Consider HIIT, at least 10 minutes  daily.  - Instruction for Mediterranean, high potassium diet and heart healthy exercise given.  - Weigh twice weekly, try to lose 1-2 lbs per week, target loss 5%, to 10%  - Need good exercise program, 4 key elements: 1. Aerobic (walking, swimming, dancing, jogging, biking, etc, 2. Muscle strengthening / resistance exercise, need to do 2-3 times weekly, 3. Stretching daily, good stretch takes a whole  total minute. 4. Balance exercise daily.  - Recommend at least annual cardiovascular evaluation in view of patient's significant risk factors. Patient's preference.  - Phone review / encourage use of [facility]    Patient Active Problem List   Diagnosis    ASD (atrial septal defect), closed 12/1996    Nonspecific abnormal electrocardiogram (ECG)    KIRA (generalized anxiety disorder)    Abnormal CT of the head    Perennial sinusitis    Migraine    BMI 29.3 - 29.9, today 30.5    Abdominal obesity    Cardiovascular risk factor, ASCVD 10-year risk 0.7%, 2012, 1.7% in 2017, 2.2% in 2018    Right sided weakness (rule out new CVA), 1/2017    Hypercholesterolemia, baseline .6    LARES (dyspnea on exertion), onset 1/2019    Eosinophilia    Acute bronchitis    Mediastinal adenopathy    Severe persistent asthma without complication    Bilateral wheezing    Anemia    Bradycardia, drug induced    Palpitations    Iron deficiency    Chronic fatigue, onset 2020    Elevated hemoglobin A1c    Hearing loss of left ear    Sleep apnea    Status post placement of implantable loop recorder, [employer], 1/2023    LV dysfunction    Cerebral convexity meningioma     Total time spend including review of record prior to face-to-face visit is 40 minutes. Greater than 50% of the time was spent in counseling and coordination of care. The above assessment and plan have been discussed at length. Referring provider's note reviewed. Labs and procedure over the last 6 months reviewed. Problem List updated. Asked to  bring in all active medications / pills bottles with next visit. Will send note to referring / PCP.                  If you have questions, please do not hesitate to call me. I look forward to following Renée along with you.    Sincerely,      Giacomo Chavira MD           CC  No Recipients

## 2025-05-16 NOTE — PROGRESS NOTES
Subjective:    Patient ID:  Renée Goff is a 59 y.o. female who presents for evaluation of Annual Exam    For prior ASD with CVA, palpitations, right-sided weakness with memory loss in 1/2017, LARES with stair climbing and abnormal Echo, controlled HLD, annual follow up.  PCP: Thomas Khan MD, [facility]   Prior Cardiologist: Dr. Jenkins  ENT: Dr. Mensah  Neurologist: Dr. Henderson, now Dr. 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  Nicotine: never  Alcohol: twice a month, single glass, the most in any 24 hours.  Illicit drugs: none  Cardiac concerns: rarely feels heart beating  Home BP: do not check  Medication compliance: yes, on 100 mg of metoprolol tartrate bid. Feel better on the medication.  Diet: regular, some salt  Caffeine: 1 cpd, no sleep problem  Labs: 1/2019, .6, not on Rx, normal CMP, CBC (eosinophil 19.3%), TSH, A1C 5.5%  Lab Results   Component Value Date    TSH 1.560 10/03/2024        Lab Results   Component Value Date    HGBA1C 5.3 10/03/2024       Lab Results   Component Value Date    WBC 4.56 01/09/2023    HGB 11.7 (L) 01/09/2023    HCT 36.1 (L) 01/09/2023    MCV 95 01/09/2023     01/09/2023       CMP  Sodium   Date Value Ref Range Status   10/03/2024 143 136 - 145 mmol/L Final     Potassium   Date Value Ref Range Status   10/03/2024 3.7 3.5 - 5.1 mmol/L Final     Chloride   Date Value Ref Range Status   10/03/2024 108 95 - 110 mmol/L Final     CO2   Date Value Ref Range Status   10/03/2024 25 23 - 29 mmol/L Final     Glucose   Date Value Ref Range Status    10/03/2024 79 70 - 110 mg/dL Final     BUN   Date Value Ref Range Status   10/03/2024 6 6 - 20 mg/dL Final     Creatinine   Date Value Ref Range Status   10/03/2024 0.9 0.5 - 1.4 mg/dL Final   11/14/2012 0.8 0.5 - 1.4 mg/dL Final     Calcium   Date Value Ref Range Status   10/03/2024 9.7 8.7 - 10.5 mg/dL Final   11/14/2012 9.6 8.7 - 10.5 mg/dL Final     Total Protein   Date Value Ref Range Status   10/03/2024 7.0 6.0 - 8.4 g/dL Final     Albumin   Date Value Ref Range Status   10/03/2024 4.0 3.5 - 5.2 g/dL Final     Total Bilirubin   Date Value Ref Range Status   10/03/2024 0.5 0.1 - 1.0 mg/dL Final     Comment:     For infants and newborns, interpretation of results should be based  on gestational age, weight and in agreement with clinical  observations.    Premature Infant recommended reference ranges:  Up to 24 hours.............<8.0 mg/dL  Up to 48 hours............<12.0 mg/dL  3-5 days..................<15.0 mg/dL  6-29 days.................<15.0 mg/dL       Alkaline Phosphatase   Date Value Ref Range Status   10/03/2024 125 55 - 135 U/L Final     AST   Date Value Ref Range Status   10/03/2024 26 10 - 40 U/L Final     ALT   Date Value Ref Range Status   10/03/2024 21 10 - 44 U/L Final     Anion Gap   Date Value Ref Range Status   10/03/2024 10 8 - 16 mmol/L Final   11/14/2012 13 5 - 15 meq/L Final     eGFR if    Date Value Ref Range Status   07/14/2020 >60 >60 mL/min/1.73 m^2 Final     eGFR if non    Date Value Ref Range Status   07/14/2020 >60 >60 mL/min/1.73 m^2 Final     Comment:     Calculation used to obtain the estimated glomerular filtration  rate (eGFR) is the CKD-EPI equation.        @labrcntip(troponini)@  eGFR   Date Value Ref Range Status   10/03/2024 >60 >60 mL/min/1.73 m^2 Final   01/09/2023 >60 >60 mL/min/1.73 m^2 Final   01/08/2023 >60 >60 mL/min/1.73 m^2 Final         BNP   Date Value Ref Range Status   05/20/2019 92 0 - 99 pg/mL Final     Comment:     Values  of less than 100 pg/ml are consistent with non-CHF populations.   }   Lab Results   Component Value Date    CHOL 124 10/03/2024    CHOL 149 11/06/2023    CHOL 145 01/07/2023     Lab Results   Component Value Date    HDL 51 10/03/2024    HDL 53 11/06/2023    HDL 52 01/07/2023     Lab Results   Component Value Date    LDLCALC 59.6 (L) 10/03/2024    LDLCALC 71.8 11/06/2023    LDLCALC 76.8 01/07/2023     Lab Results   Component Value Date    TRIG 67 10/03/2024    TRIG 121 11/06/2023    TRIG 81 01/07/2023     Lab Results   Component Value Date    CHOLHDL 41.1 10/03/2024    CHOLHDL 35.6 11/06/2023    CHOLHDL 35.9 01/07/2023     Lab Results   Component Value Date    IRON 136 10/24/2022    TRANSFERRIN 218 10/24/2022    TIBC 323 10/24/2022    FESATURATED 42 10/24/2022       Last Echo: 7/2021  Last stress test: 1/2017  Cardiovascular angiogram: none  ECG: NSR rate 66, normal  Fundoscopic exam: within the past year, negative for retinopathy    In 11/2012:  47 y.o. female admitted to Hospitalist Service from [facility] Room with complaint of chest pain 1998, history of CVA 1996 and history of migrain headache. Patient d, she was in her usual state of health, suddenly started experiencing substernal chest Pain, non-radiating, felt SOB. CP was constant. Chest pain was moderate in intensity.  No recent angina like complaints. Last cardiac stress test was in 1998. Patient was admitted to Hospitalist medicine service. Patient was evaluated by Dr. Chavira. Serial cardiac enzymes were negative.  Patient underwent Lexiscan which was negative for ischemia. During hospital stay, patient had a migrain headache which was medically managed. Patient was discharged home in stable condition with following discharge plan of care.     Cardiac evaluation, 11/2012:  ECHO CONCLUSIONS                                                                  1 - Mild left ventricular enlargement.                                       2 - Low  normal left ventricular function (EF 51%).                           3 - Normal diastolic function.                                               4 - Right ventricle is upper limit of normal in size with normal             systolic function    Lexiscan:  Nuclear Quantitative Functional Analysis:                                    LVEF: 53 % (normal is 55 - 69)                                               LVED Volume: 105 ml (normal is 60 - 98)                                      LVES Volume: 49 ml (normal is 20 - 42)                                                                                                                    Impression: NORMAL MYOCARDIAL PERFUSION                                      1. The perfusion scan is free of evidence for myocardial ischemia or         injury.                                                                      2. There is a moderate intensity fixed defect in the anteroapical wall of    the left ventricle, secondary to breast attenuation.                         3. Resting wall motion is physiologic.                                       4. There is resting LV dysfunction with a reduced ejection fraction of 53    %.  (normal is 55 - 69)                                                      5. The ventricular volumes are normal at rest and stress.                    6. The extracardiac distribution of radioactivity is normal.    Since visit of 12/3/2012, no new problem, improve situation at home, caring for 3 grandson, bike twice a week for about 30 minutes with occasional walk, no regular exercise routine. Still working part-time. Denies any symptoms. EKG today NSR, rate of 70 with anterior T waves inversion.    Since visit of 1/27/2014, began experiencing sudden onset of vertigo after Maryanne, accompanied by nausea and unable to stand nor walk. Can last up to a few days, usually a day and a half. No fall. No problem with home medications. Migraine not a problem.  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 ECG continue to show anterior T-waves inversion. No CP unless out of propranolol. Last lipid in record, done 11/2012, showed LDL-C 119. ASCVD 10-year risk is only 0.74%, very, very very low.    In 10/2016, no new problem, less stress, now caring for grandchildren, active walking with stroller every other day and biking every other day. Noted some increase chest pounding this month, last up to 20 minutes, feels real anxious, no additional medication. Some SOB. Migraines are less, may be once every other week but can last up to 2 days. Mother passed in 11/2015, age 72.     In 11/2017, here for annual reviewed, had sudden onset of right-sided weakness with memory loss in 1/2017, hospitalized for 2 days, was on full dose ASA at the time.  DCS - "Hospital Course:   Patient is a 51 y.o. female admitted to Hospitalist Service from [facility] Room with complaint of chest pain for 1 day. Patient reportedly has past medical history significant for hypertension, CAD, history of AMI and CVA. Patient reported chest pain started last night which was radiating to arms and upper back. Patient has also been experiencing headache. No head injury, LOC or seizure activity reported. Patient denied any focal deficits. No associated nausea or diaphorsis reported. No leg swelling of calf tenderness. Patient denied shortness of breath, abdominal pain, nausea, vomiting, headache, vision changes, focal neuro-deficits, cough or fever. Patient was admitted to Hospitalist medicine service. Patient was evaluated by Dr. Zamora and Dr. Chavira. Patient was monitored on telemetry medical floor, serial cardiac enzymes remained negative. Patient was evaluated by cardiologist and had further cardiac workup as mentioned below, which was negative for acute ischemia. Patient's symptoms resolved. MRI brain did not confirm CVA. Neurological symptoms resolved. Patient encouraged to follow up " "closely and have amnesia work up completed. Patient was discharged home in stable condition with following discharge plan of care.      Consults: Dr. Zamora and Dr. Fan Montes:  1. The perfusion scan is free of evidence for myocardial ischemia or injury.   2. There is abnormal wall motion at rest showing moderate hypokinesis of the septal wall of the left ventricle.   3. Resting LV function is normal.  (normal is 55 - 69)  4. The ventricular volumes are normal at rest and stress.   5. The extracardiac distribution of radioactivity is normal.   6. There is evidence of right ventricular hypertrophy.   7. When compared to the previous study from 11/13/2012, the RV appears more prominent on current study.     MRI Brain: Negative MRI of the brain.  Chronic right maxillary and ethmoid sinusitis.  Carotid US: Significant atherosclerotic plaque or calcification is not identified in the carotid arteries by real-time ultrasound or NASCET criteria      ECHO:     1 - Low normal left ventricular systolic function (EF 50-55%).     2 - Normal left ventricular diastolic function.     3 - Right ventricle is upper limit of normal in size with normal systolic function.     4 - The estimated PA systolic pressure is greater than 31 mmHg.     5 - Difficult windows, even with the use of Optison contrast.  Unable to adequately assess for discrete wall motion abnormalities .     6 - Compared to echo from 11/13/2012, no significant change."     Lipid .4, ASCVD 10-year event risk only 1.7%, was placed on atorvastatin 20 mg, no repeat lipid obtained. For the past month noted recurrent right sided CP, like a pressure radiating to the back, last up to half an hour, intensity rated 6/10, no associated symptoms, able to return to sleep. No known inciting factor, don't feel stress. Active with walking daily for half an hour, no problem excepted noted some heart racing. ECG is essentially normal , rate 72, non-specific STA. Compliant with " medications, never smoked, very occasional alcohol. Uses Mobic very rarely for pain in the shoulder.     In 5/2019, here for annual review and medication refill. No heart worries, noted some SOB when making the bed. Gym exercise no problem. Labs indicates eosinophilia suspect some form allergies. Do report occasional wheezing and cough.    In 6/2021 return for need of Rx. Continue with some concern of LARES but now able to climb 2 flights of stairs. Back working full time with problem. Some palpitations at night while watching TV resolve by falling asleep. Scary.     In 7/2021, return for Echo results.  Echo - The left ventricle is normal in size with concentric remodeling and mildly decreased systolic function.  The estimated ejection fraction is 45%.  Grade I left ventricular diastolic dysfunction.  Normal right ventricular size.  Mild mitral regurgitation.  The left ventricular global longitudinal strain is -13%. Reduced.  There is left ventricular global hypokinesis.  Suggest further deterioration of LVEF from Echo in 1/2017    In 8/2021, no significant change, able to tolerate low dose ARB and iron supplement. Been on BB for several years, noted some fatigue starting last year.     In 10/2022, return for annual follow up and medication renewal. Doing well, just mild LARES, improved. Placed on supplement for KERMIT. No recent labs. No heart worries.    In 1/2024,l return for review. Still concern of LARES with stair climbing. Adriana review shows low iron saturation 2 years ago and improved post supplement, off iron for some time and now recurrent anemia on CBC. Discussed BB but had problem with tachycardia with use of half dose. No neurologic issues except for migraines, helped by BB.     HPI Comments: in 5/2025, return for annual review. Did not proceed with stress test. Have remained active without problem. No heart worries.    ROS     Constitutional: negative for chills, no fatigue, no fevers, sweats, weight loss 39 lbs  "since 1/2024 from diet and exercise.  HENT: no sinus congestion, no further hoarseness or sore throat.  Eyes: negative for icterus, redness and visual disturbance  Respiratory: negative for asthma, cough, no dyspnea on exertion, no hemoptysis, pleurisy/chest pain and wheezing, [place] score 2, snoring noted but awaken refreshed.  Cardiovascular: no chest pain, no chest pressure/discomfort, dyspnea, near-syncope, positive for night time palpitations but improved and no LARES, no paroxysmal nocturnal dyspnea and syncope  Gastrointestinal: no abdominal pain, heart burn, no nausea and vomiting  Musculoskeletal: negative for arthralgias, muscle weakness and myalgias, some back pain with bending, keep moving  Neurological: negative for coordination problems, gait problems, tremors, less dizziness and vertigo, rare headaches / migraine with nausea, and paresthesia, occasional migraine HA, can last up to 2 days. Some difficulties in concentration and coordination, no fall but do lose balance easily.  Psych: no depression or anxiety, no further memory loss, some anxiety    Objective:    Physical Exam     General appearance: alert, appears stated age, cooperative and no distress, RA O2 sat 97%, Orthostatic BP: sitting 109/57, standing 100/64  Head: Normocephalic, without obvious abnormality, atraumatic  Eyes:  conjunctivae/corneas clear. PERRL, EOM's intact.   Neck: no adenopathy, no carotid bruit, no JVD, supple, symmetrical, trachea midline and thyroid not enlarged, symmetric, no tenderness/mass/nodules  Lungs:  clear to auscultation bilaterally  Chest wall: no further tenderness  Heart: bradycardic rate and rhythm, S1, S2 normal, no murmur, click, rub or gallop   Abdomen: soft, non-tender; bowel sounds normal; no masses,  no organomegaly, waist circumference 35.25" decreased to 32.5", hip 41"  Extremities: extremities normal, atraumatic, no cyanosis or edema  Pulses: 2+ and symmetric    Assessment:       1. Cardiovascular " risk factor, ASCVD 10-year risk 0.7%, 2012, 1.7% in 2017, 2.2% in 2018    2. ASD (atrial septal defect), closed 12/1996    3. Status post placement of implantable loop recorder, [employer], 1/2023    4. Hypercholesterolemia, baseline .6         Plan:       Renée MORENO was seen today for follow-up.    Diagnoses and associated orders for this visit:    Cardiovascular risk factor, ASCVD 10-year risk 0.7%, 2012, 1.7% in 2017, 2.2% in 2018  -     IN OFFICE EKG 12-LEAD (to Muse)    ASD (atrial septal defect), closed 12/1996    Status post placement of implantable loop recorder, [employer], 1/2023  -     IN OFFICE EKG 12-LEAD (to Muse)    Hypercholesterolemia, baseline .6      - All medical issues reviewed, continue current Rx.   - CV status and all medications reviewed, patient acknowledge good understanding.  - Recommend healthy living: moderate alcohol, healthy diet and regular exercise aiming for fitness, restorative sleep and weight control  - Discussed healthy alcohol daily limit of 0.5 oz of pure alcohol in any 24 hours (roughly one 12-oz beers, 5 oz of wine (8%-12% alcohol), or 0.75 oz (half a shot) of liquor (80 proof)), can not save up.  - Highly recommend 30-60 minutes of exercise daily, can have Sunday off, with 2-3 sessions of muscle strengthening weekly. A  would be very helpful.  - Instruction for Mediterranean, high potassium diet and heart healthy exercise given.  - Need good exercise program, 4 key elements: 1. Aerobic (walking, swimming, dancing, jogging, biking, etc, 2. Muscle strengthening / resistance exercise, need to do 2-3 times weekly, 3. Stretching daily, good stretch takes a whole  total minute. 4. Balance exercise daily.  - Recommend at least annual cardiovascular evaluation in view of patient's significant risk factors. Patient's preference.    Patient Active Problem List   Diagnosis    ASD (atrial septal defect), closed 12/1996    Nonspecific abnormal  electrocardiogram (ECG)    KIRA (generalized anxiety disorder)    Abnormal CT of the head    Perennial sinusitis    Migraine    BMI 29.3 - 29.9, today 30.5    Abdominal obesity    Cardiovascular risk factor, ASCVD 10-year risk 0.7%, 2012, 1.7% in 2017, 2.2% in 2018    Right sided weakness (rule out new CVA), 1/2017    Hypercholesterolemia, baseline .6    LARES (dyspnea on exertion), onset 1/2019    Eosinophilia    Acute bronchitis    Mediastinal adenopathy    Severe persistent asthma without complication    Bilateral wheezing    Anemia    Bradycardia, drug induced    Palpitations    Iron deficiency    Chronic fatigue, onset 2020    Elevated hemoglobin A1c    Hearing loss of left ear    Sleep apnea    Status post placement of implantable loop recorder, [employer], 1/2023    LV dysfunction    Cerebral convexity meningioma     Total time spend including review of record prior to face-to-face visit is 30 minutes. Greater than 50% of the time was spent in counseling and coordination of care. The above assessment and plan have been discussed at length. Referring provider's note reviewed. Labs and procedure over the last 6 months reviewed. Problem List updated. Asked to bring in all active medications / pills bottles with next visit. Will send note to referring / PCP.

## 2025-05-25 DIAGNOSIS — G45.9 TRANSIENT CEREBRAL ISCHEMIC ATTACK, UNSPECIFIED: ICD-10-CM

## 2025-05-25 NOTE — TELEPHONE ENCOUNTER
No care due was identified.  [facility] Embedded Care Due Messages. Reference number: [id number].   5/25/2025 9:43:43 AM CDT

## 2025-05-26 RX ORDER — ATORVASTATIN CALCIUM 20 MG/1
20 TABLET, FILM COATED ORAL
Qty: 90 TABLET | Refills: 1 | OUTPATIENT
Start: 2025-05-26

## 2025-05-26 NOTE — TELEPHONE ENCOUNTER
Refill Decision Note   Renée Rocheburn  is requesting a refill authorization.  Brief Assessment and Rationale for Refill:  Quick Discontinue     Medication Therapy Plan:  pt is on 40 mg    Medication Reconciliation Completed: No   Comments:     No Care Gaps recommended.     Note composed:10:34 AM 05/26/2025

## 2025-05-27 ENCOUNTER — OFFICE VISIT (OUTPATIENT)
Dept: INTERNAL MEDICINE | Facility: CLINIC | Age: 60
End: 2025-05-27
Attending: FAMILY MEDICINE
Payer: COMMERCIAL

## 2025-05-27 DIAGNOSIS — J45.50 SEVERE PERSISTENT ASTHMA WITHOUT COMPLICATION: ICD-10-CM

## 2025-05-27 DIAGNOSIS — J20.9 ACUTE BRONCHITIS, UNSPECIFIED ORGANISM: ICD-10-CM

## 2025-05-27 DIAGNOSIS — D32.0 CEREBRAL CONVEXITY MENINGIOMA: ICD-10-CM

## 2025-05-27 DIAGNOSIS — E78.00 HYPERCHOLESTEROLEMIA: ICD-10-CM

## 2025-05-27 PROCEDURE — 98006 SYNCH AUDIO-VIDEO EST MOD 30: CPT | Mod: 95,,, | Performed by: FAMILY MEDICINE

## 2025-05-27 PROCEDURE — G2211 COMPLEX E/M VISIT ADD ON: HCPCS | Mod: 95,,, | Performed by: FAMILY MEDICINE

## 2025-05-27 PROCEDURE — 1159F MED LIST DOCD IN RCRD: CPT | Mod: CPTII,95,, | Performed by: FAMILY MEDICINE

## 2025-05-27 PROCEDURE — 1160F RVW MEDS BY RX/DR IN RCRD: CPT | Mod: CPTII,95,, | Performed by: FAMILY MEDICINE

## 2025-05-27 RX ORDER — ATORVASTATIN CALCIUM 20 MG/1
20 TABLET, FILM COATED ORAL DAILY
Qty: 90 TABLET | Refills: 3 | Status: SHIPPED | OUTPATIENT
Start: 2025-05-27

## 2025-05-27 RX ORDER — FLUTICASONE PROPIONATE 50 MCG
SPRAY, SUSPENSION (ML) NASAL
Qty: 48 ML | Refills: 1 | Status: SHIPPED | OUTPATIENT
Start: 2025-05-27

## 2025-05-27 RX ORDER — ATORVASTATIN CALCIUM 20 MG/1
20 TABLET, FILM COATED ORAL DAILY
COMMUNITY
End: 2025-05-27 | Stop reason: SDUPTHER

## 2025-05-27 RX ORDER — ATORVASTATIN CALCIUM 40 MG/1
40 TABLET, FILM COATED ORAL DAILY
Qty: 90 TABLET | Refills: 3 | Status: CANCELLED | OUTPATIENT
Start: 2025-05-27

## 2025-05-27 RX ORDER — MONTELUKAST SODIUM 10 MG/1
10 TABLET ORAL DAILY
Qty: 90 TABLET | Refills: 3 | Status: SHIPPED | OUTPATIENT
Start: 2025-05-27

## 2025-05-27 NOTE — PROGRESS NOTES
The patient location is: [place]  The chief complaint leading to consultation is:  Medication management    Visit type: audiovisual    Face to Face time with patient:  5 minutes  10 minutes of total time spent on the encounter, which includes face to face time and non-face to face time preparing to see the patient (eg, review of tests), Obtaining and/or reviewing separately obtained history, Documenting clinical information in the electronic or other health record, Independently interpreting results (not separately reported) and communicating results to the patient/family/caregiver, or Care coordination (not separately reported).         Each patient to whom he or she provides medical services by telemedicine is:  (1) informed of the relationship between the physician and patient and the respective role of any other health care provider with respect to management of the patient; and (2) notified that he or she may decline to receive medical services by telemedicine and may withdraw from such care at any time.    Notes:   CHIEF COMPLAINT:  Refills    HISTORY OF PRESENT ILLNESS: The patient presents needing some refills.     she is due for her colonoscopy in a couple of years.    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    REVIEW OF SYSTEMS:  GENERAL: No fever, chills, fatigability or weight loss.  SKIN: No rashes, itching or changes in color or texture of skin.  HEAD: No headaches or recent head trauma.  EYES: Visual acuity fine. No photophobia, ocular pain or diplopia.  EARS: Denies ear pain, discharge or vertigo.  NOSE: No loss of smell, no epistaxis or postnasal drip.  MOUTH & THROAT: No hoarseness or change in voice. No excessive gum bleeding.  NODES: Denies swollen glands.  CHEST: Denies LARES, cyanosis, wheezing, cough and sputum production.  CARDIOVASCULAR: Denies chest pain, PND, orthopnea or reduced exercise tolerance.  ABDOMEN: Appetite fine. No  weight loss. Denies diarrhea, hematemesis or blood in stool.  URINARY: No hematuria.  PERIPHERAL VASCULAR: No claudication or cyanosis.  MUSCULOSKELETAL: No joint stiffness or swelling.   NEUROLOGIC: No history of seizures, paralysis, alteration of gait or coordination.    PHYSICAL EXAMINATION:     LMP 10/12/2012     APPEARANCE: Well nourished, well developed, in no acute distress.    HEAD: Normocephalic, atraumatic.  EYES: PERRL. EOMI.  Conjunctivae without injection and  anicteric  NEUROLOGIC:       Normal speech development.      Hearing normal.  PSYCHIATRIC: Patient is alert and oriented x3.  Thought processes are all normal.  There is no homicidality.  There is no suicidality.  There is no evidence of psychosis.    LABORATORY/RADIOLOGY: Chart reviewed.    ASSESSMENT:   Hyperlipidemia  GERD with esophagitis  Anxiety    PLAN:  Flonase Lipitor and Singulair refilled today  Xanax  Return to clinic later this year                        Answers submitted by the patient for this visit:  Review of Systems Questionnaire (Submitted on 5/26/2025)  activity change: No  unexpected weight change: No  neck pain: No  hearing loss: No  rhinorrhea: No  trouble swallowing: No  eye discharge: No  visual disturbance: No  chest tightness: No  wheezing: No  chest pain: No  palpitations: No  blood in stool: No  constipation: No  vomiting: No  diarrhea: No  polydipsia: No  polyuria: No  difficulty urinating: No  hematuria: No  menstrual problem: No  dysuria: No  joint swelling: No  arthralgias: No  headaches: No  weakness: No  confusion: No  dysphoric mood: No

## 2025-05-31 ENCOUNTER — TELEPHONE (OUTPATIENT)
Dept: PHARMACY | Facility: CLINIC | Age: 60
End: 2025-05-31
Payer: COMMERCIAL

## 2025-06-01 NOTE — TELEPHONE ENCOUNTER
[facility]/Population Health Chart Review & Patient Outreach Details For Medication Adherence Project    Reason for Outreach Encounter: 3rd Party payor non-compliance report ([employer], [employer], C, etc)  2.  Patient Outreach Method: Reviewed Patient Chart  3.   Medication in question: atorvastatin    LAST FILLED: 5/27/25 for 90 day supply  Hyperlipidemia Medications              atorvastatin (LIPITOR) 20 MG tablet Take 1 tablet (20 mg total) by mouth once daily.    atorvastatin (LIPITOR) 40 MG tablet Take 1 tablet (40 mg total) by mouth once daily.               4.  Reviewed and or Updates Made To: Patient Chart  5. Outreach Outcomes and/or actions taken: Patient filled medication and is on track to be adherent

## 2025-06-07 DIAGNOSIS — R00.2 PALPITATIONS: ICD-10-CM

## 2025-06-07 NOTE — TELEPHONE ENCOUNTER
No care due was identified.  [facility] Embedded Care Due Messages. Reference number: [id number].   6/07/2025 8:31:46 AM CDT

## 2025-06-08 NOTE — TELEPHONE ENCOUNTER
Refill Routing Note   Medication(s) are not appropriate for processing by [facility] for the following reason(s):        New or recently adjusted medication    [facility] action(s):  Defer             Appointments  past 12m or future 3m with PCP    Date Provider   Last Visit   5/27/2025 Thomas Khan MD   Next Visit   Visit date not found Thomas Khan MD   ED visits in past 90 days: 0        Note composed:9:58 PM 06/07/2025

## 2025-06-09 RX ORDER — METOPROLOL TARTRATE 50 MG/1
50 TABLET ORAL 2 TIMES DAILY
Qty: 180 TABLET | Refills: 3 | Status: SHIPPED | OUTPATIENT
Start: 2025-06-09

## 2025-06-18 DIAGNOSIS — G43.009 MIGRAINE WITHOUT AURA AND WITHOUT STATUS MIGRAINOSUS, NOT INTRACTABLE: ICD-10-CM

## 2025-06-18 RX ORDER — ALPRAZOLAM 0.5 MG/1
0.5 TABLET ORAL 2 TIMES DAILY
Qty: 60 TABLET | Refills: 0 | Status: SHIPPED | OUTPATIENT
Start: 2025-06-18

## 2025-06-18 NOTE — TELEPHONE ENCOUNTER
Refill Encounter    PCP Visits: Recent Visits  Date Type Provider Dept   05/27/25 Office Visit Thomas Khan MD [facility]   11/14/24 Office Visit Hua Chinchilla NP [facility]   10/03/24 Office Visit Thomas Khan MD [facility]   Showing recent visits within past 360 days and meeting all other requirements  Future Appointments  No visits were found meeting these conditions.  Showing future appointments within next 720 days and meeting all other requirements      Last 3 Blood Pressure:   BP Readings from Last 3 Encounters:   05/16/25 100/64   10/03/24 104/66   01/23/24 123/67     Preferred Pharmacy:   [facility]/pharmacy #[id number] - [place] - [address]  [address] [place] [address]  Phone: [phone] Fax: [phone]    Requested RX:  Requested Prescriptions     Pending Prescriptions Disp Refills    ALPRAZolam (XANAX) 0.5 MG tablet 60 tablet 0     Sig: Take 1 tablet (0.5 mg total) by mouth 2 (two) times daily.      RX Route: Normal

## 2025-06-18 NOTE — TELEPHONE ENCOUNTER
No care due was identified.  [facility] Embedded Care Due Messages. Reference number: [id number].   6/18/2025 10:42:32 AM CDT

## 2025-07-08 ENCOUNTER — TELEPHONE (OUTPATIENT)
Dept: PHARMACY | Facility: CLINIC | Age: 60
End: 2025-07-08
Payer: COMMERCIAL

## 2025-07-08 NOTE — TELEPHONE ENCOUNTER
[facility]/Population Health Chart Review & Patient Outreach Details For Medication Adherence Project    Reason for Outreach Encounter: 3rd Party payor non-compliance report ([employer], [employer], [employer], etc)  2.  Patient Outreach Method: Reviewed patient chart   3.   Medication in question:    Hyperlipidemia Medications              atorvastatin (LIPITOR) 20 MG tablet Take 1 tablet (20 mg total) by mouth once daily.    atorvastatin (LIPITOR) 40 MG tablet Take 1 tablet (40 mg total) by mouth once daily.                  atorvastatin   last filled  5/27/25 for 90 day supply    4.  Reviewed and or Updates Made To: Patient Chart  5. Outreach Outcomes and/or actions taken: Patient filled medication and is on track to be adherent  Additional Notes:

## 2025-08-11 RX ORDER — PANTOPRAZOLE SODIUM 40 MG/1
40 TABLET, DELAYED RELEASE ORAL
Qty: 90 TABLET | Refills: 3 | Status: SHIPPED | OUTPATIENT
Start: 2025-08-11

## 2025-08-17 DIAGNOSIS — G43.009 MIGRAINE WITHOUT AURA AND WITHOUT STATUS MIGRAINOSUS, NOT INTRACTABLE: ICD-10-CM

## 2025-08-18 RX ORDER — ALPRAZOLAM 0.5 MG/1
0.5 TABLET ORAL 2 TIMES DAILY
Qty: 60 TABLET | Refills: 0 | Status: SHIPPED | OUTPATIENT
Start: 2025-08-18

## 2025-09-01 DIAGNOSIS — G43.019 COMMON MIGRAINE WITH INTRACTABLE MIGRAINE: ICD-10-CM

## 2025-09-02 RX ORDER — AMITRIPTYLINE HYDROCHLORIDE 10 MG/1
40 TABLET, FILM COATED ORAL NIGHTLY
Qty: 360 TABLET | Refills: 1 | Status: SHIPPED | OUTPATIENT
Start: 2025-09-02

## (undated) DEVICE — GLOVE SURG ULTRA TOUCH 8.5

## (undated) DEVICE — Device

## (undated) DEVICE — SUT CTD VICRYL 4-0 BR PS-2

## (undated) DEVICE — SEE MEDLINE ITEM 152622

## (undated) DEVICE — NDL SPINAL 22GA 3.5 IN QUINCKE